# Patient Record
Sex: FEMALE | Race: WHITE | Employment: OTHER | URBAN - METROPOLITAN AREA
[De-identification: names, ages, dates, MRNs, and addresses within clinical notes are randomized per-mention and may not be internally consistent; named-entity substitution may affect disease eponyms.]

---

## 2017-12-09 ENCOUNTER — HOSPITAL ENCOUNTER (OUTPATIENT)
Dept: ULTRASOUND IMAGING | Age: 77
Discharge: HOME | End: 2017-12-09
Attending: INTERNAL MEDICINE | Admitting: INTERNAL MEDICINE

## 2017-12-09 DIAGNOSIS — N17.9 ACUTE RENAL FAILURE, UNSPECIFIED ACUTE RENAL FAILURE TYPE (HCC): ICD-10-CM

## 2017-12-09 DIAGNOSIS — N17.9 ACUTE KIDNEY FAILURE (HCC): ICD-10-CM

## 2018-02-05 PROBLEM — E66.9 OBESITY: Status: ACTIVE | Noted: 2018-02-05

## 2018-02-05 PROBLEM — R09.02 HYPOXIA: Status: ACTIVE | Noted: 2018-02-05

## 2018-02-05 PROBLEM — J18.0 BRONCHOPNEUMONIA: Status: ACTIVE | Noted: 2018-02-05

## 2018-02-05 PROBLEM — N18.9 CKD (CHRONIC KIDNEY DISEASE): Status: ACTIVE | Noted: 2018-02-05

## 2020-01-01 ENCOUNTER — HOSPITAL ENCOUNTER (INPATIENT)
Age: 80
LOS: 10 days | DRG: 329 | End: 2021-01-06
Attending: EMERGENCY MEDICINE | Admitting: INTERNAL MEDICINE
Payer: MEDICARE

## 2020-01-01 ENCOUNTER — ANESTHESIA EVENT (OUTPATIENT)
Dept: OPERATING ROOM | Age: 80
DRG: 329 | End: 2020-01-01
Payer: MEDICARE

## 2020-01-01 ENCOUNTER — ANESTHESIA (OUTPATIENT)
Dept: OPERATING ROOM | Age: 80
DRG: 329 | End: 2020-01-01
Payer: MEDICARE

## 2020-01-01 ENCOUNTER — APPOINTMENT (OUTPATIENT)
Dept: CT IMAGING | Age: 80
DRG: 329 | End: 2020-01-01
Payer: MEDICARE

## 2020-01-01 ENCOUNTER — APPOINTMENT (OUTPATIENT)
Dept: GENERAL RADIOLOGY | Age: 80
DRG: 329 | End: 2020-01-01
Payer: MEDICARE

## 2020-01-01 VITALS
RESPIRATION RATE: 1 BRPM | OXYGEN SATURATION: 99 % | SYSTOLIC BLOOD PRESSURE: 184 MMHG | DIASTOLIC BLOOD PRESSURE: 87 MMHG

## 2020-01-01 DIAGNOSIS — E87.1 HYPONATREMIA: ICD-10-CM

## 2020-01-01 DIAGNOSIS — N17.9 AKI (ACUTE KIDNEY INJURY) (HCC): ICD-10-CM

## 2020-01-01 DIAGNOSIS — R10.30 ABDOMINAL PAIN, LOWER: ICD-10-CM

## 2020-01-01 DIAGNOSIS — R11.2 NON-INTRACTABLE VOMITING WITH NAUSEA, UNSPECIFIED VOMITING TYPE: ICD-10-CM

## 2020-01-01 DIAGNOSIS — K56.609 SMALL BOWEL OBSTRUCTION (HCC): Primary | ICD-10-CM

## 2020-01-01 DIAGNOSIS — E87.6 HYPOKALEMIA: ICD-10-CM

## 2020-01-01 DIAGNOSIS — K56.601 COMPLETE INTESTINAL OBSTRUCTION, UNSPECIFIED CAUSE (HCC): ICD-10-CM

## 2020-01-01 DIAGNOSIS — R93.5 ABNORMAL CT OF THE ABDOMEN: ICD-10-CM

## 2020-01-01 LAB
A/G RATIO: 1.2 (ref 1.1–2.2)
ALBUMIN SERPL-MCNC: 3.6 G/DL (ref 3.4–5)
ALP BLD-CCNC: 102 U/L (ref 40–129)
ALT SERPL-CCNC: 17 U/L (ref 10–40)
ANION GAP SERPL CALCULATED.3IONS-SCNC: 11 MMOL/L (ref 3–16)
ANION GAP SERPL CALCULATED.3IONS-SCNC: 12 MMOL/L (ref 3–16)
ANION GAP SERPL CALCULATED.3IONS-SCNC: 14 MMOL/L (ref 3–16)
ANION GAP SERPL CALCULATED.3IONS-SCNC: 15 MMOL/L (ref 3–16)
ANION GAP SERPL CALCULATED.3IONS-SCNC: 17 MMOL/L (ref 3–16)
AST SERPL-CCNC: 35 U/L (ref 15–37)
ATYPICAL LYMPHOCYTE RELATIVE PERCENT: 2 % (ref 0–6)
BANDED NEUTROPHILS RELATIVE PERCENT: 2 % (ref 0–7)
BASOPHILS ABSOLUTE: 0 K/UL (ref 0–0.2)
BASOPHILS ABSOLUTE: 0.1 K/UL (ref 0–0.2)
BASOPHILS RELATIVE PERCENT: 0 %
BASOPHILS RELATIVE PERCENT: 0.2 %
BASOPHILS RELATIVE PERCENT: 0.2 %
BASOPHILS RELATIVE PERCENT: 0.3 %
BASOPHILS RELATIVE PERCENT: 0.3 %
BILIRUB SERPL-MCNC: 0.4 MG/DL (ref 0–1)
BILIRUBIN URINE: NEGATIVE
BLOOD, URINE: NEGATIVE
BUN BLDV-MCNC: 34 MG/DL (ref 7–20)
BUN BLDV-MCNC: 38 MG/DL (ref 7–20)
BUN BLDV-MCNC: 49 MG/DL (ref 7–20)
BUN BLDV-MCNC: 83 MG/DL (ref 7–20)
BUN BLDV-MCNC: 96 MG/DL (ref 7–20)
CALCIUM SERPL-MCNC: 8.2 MG/DL (ref 8.3–10.6)
CALCIUM SERPL-MCNC: 8.3 MG/DL (ref 8.3–10.6)
CALCIUM SERPL-MCNC: 9 MG/DL (ref 8.3–10.6)
CALCIUM SERPL-MCNC: 9.1 MG/DL (ref 8.3–10.6)
CALCIUM SERPL-MCNC: 9.2 MG/DL (ref 8.3–10.6)
CHLORIDE BLD-SCNC: 100 MMOL/L (ref 99–110)
CHLORIDE BLD-SCNC: 103 MMOL/L (ref 99–110)
CHLORIDE BLD-SCNC: 89 MMOL/L (ref 99–110)
CHLORIDE BLD-SCNC: 96 MMOL/L (ref 99–110)
CHLORIDE BLD-SCNC: 98 MMOL/L (ref 99–110)
CLARITY: ABNORMAL
CO2: 21 MMOL/L (ref 21–32)
CO2: 23 MMOL/L (ref 21–32)
CO2: 24 MMOL/L (ref 21–32)
COLOR: YELLOW
CREAT SERPL-MCNC: 1.2 MG/DL (ref 0.6–1.2)
CREAT SERPL-MCNC: 1.4 MG/DL (ref 0.6–1.2)
CREAT SERPL-MCNC: 1.6 MG/DL (ref 0.6–1.2)
CREAT SERPL-MCNC: 2.1 MG/DL (ref 0.6–1.2)
CREAT SERPL-MCNC: 2.6 MG/DL (ref 0.6–1.2)
EOSINOPHILS ABSOLUTE: 0 K/UL (ref 0–0.6)
EOSINOPHILS ABSOLUTE: 0 K/UL (ref 0–0.6)
EOSINOPHILS ABSOLUTE: 0.1 K/UL (ref 0–0.6)
EOSINOPHILS ABSOLUTE: 0.1 K/UL (ref 0–0.6)
EOSINOPHILS ABSOLUTE: 0.2 K/UL (ref 0–0.6)
EOSINOPHILS RELATIVE PERCENT: 0.1 %
EOSINOPHILS RELATIVE PERCENT: 0.1 %
EOSINOPHILS RELATIVE PERCENT: 0.3 %
EOSINOPHILS RELATIVE PERCENT: 0.7 %
EOSINOPHILS RELATIVE PERCENT: 2 %
GFR AFRICAN AMERICAN: 22
GFR AFRICAN AMERICAN: 28
GFR AFRICAN AMERICAN: 38
GFR AFRICAN AMERICAN: 44
GFR AFRICAN AMERICAN: 53
GFR NON-AFRICAN AMERICAN: 18
GFR NON-AFRICAN AMERICAN: 23
GFR NON-AFRICAN AMERICAN: 31
GFR NON-AFRICAN AMERICAN: 36
GFR NON-AFRICAN AMERICAN: 43
GLOBULIN: 3 G/DL
GLUCOSE BLD-MCNC: 100 MG/DL (ref 70–99)
GLUCOSE BLD-MCNC: 104 MG/DL (ref 70–99)
GLUCOSE BLD-MCNC: 113 MG/DL (ref 70–99)
GLUCOSE BLD-MCNC: 115 MG/DL (ref 70–99)
GLUCOSE BLD-MCNC: 150 MG/DL (ref 70–99)
GLUCOSE URINE: NEGATIVE MG/DL
HCT VFR BLD CALC: 32.2 % (ref 36–48)
HCT VFR BLD CALC: 33.9 % (ref 36–48)
HCT VFR BLD CALC: 36.6 % (ref 36–48)
HCT VFR BLD CALC: 36.7 % (ref 36–48)
HCT VFR BLD CALC: 37.5 % (ref 36–48)
HEMOGLOBIN: 10.4 G/DL (ref 12–16)
HEMOGLOBIN: 10.9 G/DL (ref 12–16)
HEMOGLOBIN: 11.9 G/DL (ref 12–16)
HEMOGLOBIN: 12.1 G/DL (ref 12–16)
HEMOGLOBIN: 12.4 G/DL (ref 12–16)
KETONES, URINE: NEGATIVE MG/DL
LEUKOCYTE ESTERASE, URINE: NEGATIVE
LIPASE: 108 U/L (ref 13–60)
LYMPHOCYTES ABSOLUTE: 1.4 K/UL (ref 1–5.1)
LYMPHOCYTES ABSOLUTE: 1.5 K/UL (ref 1–5.1)
LYMPHOCYTES ABSOLUTE: 1.6 K/UL (ref 1–5.1)
LYMPHOCYTES ABSOLUTE: 1.6 K/UL (ref 1–5.1)
LYMPHOCYTES ABSOLUTE: 2.1 K/UL (ref 1–5.1)
LYMPHOCYTES RELATIVE PERCENT: 12 %
LYMPHOCYTES RELATIVE PERCENT: 12.1 %
LYMPHOCYTES RELATIVE PERCENT: 16.5 %
LYMPHOCYTES RELATIVE PERCENT: 25.6 %
LYMPHOCYTES RELATIVE PERCENT: 9.8 %
MAGNESIUM: 1.6 MG/DL (ref 1.8–2.4)
MAGNESIUM: 1.6 MG/DL (ref 1.8–2.4)
MAGNESIUM: 1.8 MG/DL (ref 1.8–2.4)
MCH RBC QN AUTO: 27 PG (ref 26–34)
MCH RBC QN AUTO: 27 PG (ref 26–34)
MCH RBC QN AUTO: 27.1 PG (ref 26–34)
MCH RBC QN AUTO: 27.8 PG (ref 26–34)
MCH RBC QN AUTO: 28 PG (ref 26–34)
MCHC RBC AUTO-ENTMCNC: 31.6 G/DL (ref 31–36)
MCHC RBC AUTO-ENTMCNC: 32.2 G/DL (ref 31–36)
MCHC RBC AUTO-ENTMCNC: 32.4 G/DL (ref 31–36)
MCHC RBC AUTO-ENTMCNC: 33.1 G/DL (ref 31–36)
MCHC RBC AUTO-ENTMCNC: 33.7 G/DL (ref 31–36)
MCV RBC AUTO: 82.4 FL (ref 80–100)
MCV RBC AUTO: 83.8 FL (ref 80–100)
MCV RBC AUTO: 83.9 FL (ref 80–100)
MCV RBC AUTO: 84.7 FL (ref 80–100)
MCV RBC AUTO: 85.3 FL (ref 80–100)
MICROSCOPIC EXAMINATION: ABNORMAL
MONOCYTES ABSOLUTE: 0.8 K/UL (ref 0–1.3)
MONOCYTES ABSOLUTE: 0.9 K/UL (ref 0–1.3)
MONOCYTES ABSOLUTE: 1 K/UL (ref 0–1.3)
MONOCYTES ABSOLUTE: 1 K/UL (ref 0–1.3)
MONOCYTES ABSOLUTE: 1.2 K/UL (ref 0–1.3)
MONOCYTES RELATIVE PERCENT: 10.1 %
MONOCYTES RELATIVE PERCENT: 10.2 %
MONOCYTES RELATIVE PERCENT: 11.9 %
MONOCYTES RELATIVE PERCENT: 6.4 %
MONOCYTES RELATIVE PERCENT: 7 %
NEUTROPHILS ABSOLUTE: 13.3 K/UL (ref 1.7–7.7)
NEUTROPHILS ABSOLUTE: 5.1 K/UL (ref 1.7–7.7)
NEUTROPHILS ABSOLUTE: 6.4 K/UL (ref 1.7–7.7)
NEUTROPHILS ABSOLUTE: 8.9 K/UL (ref 1.7–7.7)
NEUTROPHILS ABSOLUTE: 9.4 K/UL (ref 1.7–7.7)
NEUTROPHILS RELATIVE PERCENT: 61.5 %
NEUTROPHILS RELATIVE PERCENT: 73 %
NEUTROPHILS RELATIVE PERCENT: 75 %
NEUTROPHILS RELATIVE PERCENT: 77.5 %
NEUTROPHILS RELATIVE PERCENT: 83.2 %
NITRITE, URINE: NEGATIVE
PDW BLD-RTO: 14.2 % (ref 12.4–15.4)
PDW BLD-RTO: 14.4 % (ref 12.4–15.4)
PDW BLD-RTO: 14.5 % (ref 12.4–15.4)
PDW BLD-RTO: 14.6 % (ref 12.4–15.4)
PDW BLD-RTO: 14.7 % (ref 12.4–15.4)
PH UA: 5.5 (ref 5–8)
PLATELET # BLD: 248 K/UL (ref 135–450)
PLATELET # BLD: 259 K/UL (ref 135–450)
PLATELET # BLD: 283 K/UL (ref 135–450)
PLATELET # BLD: 295 K/UL (ref 135–450)
PLATELET # BLD: 308 K/UL (ref 135–450)
PMV BLD AUTO: 7.5 FL (ref 5–10.5)
PMV BLD AUTO: 7.7 FL (ref 5–10.5)
PMV BLD AUTO: 7.9 FL (ref 5–10.5)
PMV BLD AUTO: 8 FL (ref 5–10.5)
PMV BLD AUTO: 8.1 FL (ref 5–10.5)
POTASSIUM REFLEX MAGNESIUM: 2.8 MMOL/L (ref 3.5–5.1)
POTASSIUM REFLEX MAGNESIUM: 3.2 MMOL/L (ref 3.5–5.1)
POTASSIUM REFLEX MAGNESIUM: 3.5 MMOL/L (ref 3.5–5.1)
POTASSIUM REFLEX MAGNESIUM: 3.8 MMOL/L (ref 3.5–5.1)
POTASSIUM SERPL-SCNC: 3.3 MMOL/L (ref 3.5–5.1)
PROTEIN UA: NEGATIVE MG/DL
RBC # BLD: 3.84 M/UL (ref 4–5.2)
RBC # BLD: 4.05 M/UL (ref 4–5.2)
RBC # BLD: 4.32 M/UL (ref 4–5.2)
RBC # BLD: 4.4 M/UL (ref 4–5.2)
RBC # BLD: 4.45 M/UL (ref 4–5.2)
SARS-COV-2, NAAT: NOT DETECTED
SODIUM BLD-SCNC: 128 MMOL/L (ref 136–145)
SODIUM BLD-SCNC: 131 MMOL/L (ref 136–145)
SODIUM BLD-SCNC: 135 MMOL/L (ref 136–145)
SODIUM BLD-SCNC: 138 MMOL/L (ref 136–145)
SODIUM BLD-SCNC: 139 MMOL/L (ref 136–145)
SPECIFIC GRAVITY UA: 1.02 (ref 1–1.03)
TOTAL PROTEIN: 6.6 G/DL (ref 6.4–8.2)
URINE REFLEX TO CULTURE: ABNORMAL
URINE TYPE: ABNORMAL
UROBILINOGEN, URINE: 0.2 E.U./DL
WBC # BLD: 11.6 K/UL (ref 4–11)
WBC # BLD: 12.2 K/UL (ref 4–11)
WBC # BLD: 16 K/UL (ref 4–11)
WBC # BLD: 8.2 K/UL (ref 4–11)
WBC # BLD: 8.8 K/UL (ref 4–11)

## 2020-01-01 PROCEDURE — 2580000003 HC RX 258: Performed by: SURGERY

## 2020-01-01 PROCEDURE — 36415 COLL VENOUS BLD VENIPUNCTURE: CPT

## 2020-01-01 PROCEDURE — 97535 SELF CARE MNGMENT TRAINING: CPT

## 2020-01-01 PROCEDURE — 2580000003 HC RX 258: Performed by: INTERNAL MEDICINE

## 2020-01-01 PROCEDURE — 81003 URINALYSIS AUTO W/O SCOPE: CPT

## 2020-01-01 PROCEDURE — 6360000002 HC RX W HCPCS: Performed by: INTERNAL MEDICINE

## 2020-01-01 PROCEDURE — APPSS45 APP SPLIT SHARED TIME 31-45 MINUTES: Performed by: CLINICAL NURSE SPECIALIST

## 2020-01-01 PROCEDURE — 2500000003 HC RX 250 WO HCPCS: Performed by: SURGERY

## 2020-01-01 PROCEDURE — 85025 COMPLETE CBC W/AUTO DIFF WBC: CPT

## 2020-01-01 PROCEDURE — 6360000002 HC RX W HCPCS: Performed by: SURGERY

## 2020-01-01 PROCEDURE — 6360000002 HC RX W HCPCS: Performed by: NURSE PRACTITIONER

## 2020-01-01 PROCEDURE — 1200000000 HC SEMI PRIVATE

## 2020-01-01 PROCEDURE — 99024 POSTOP FOLLOW-UP VISIT: CPT | Performed by: SURGERY

## 2020-01-01 PROCEDURE — 2500000003 HC RX 250 WO HCPCS: Performed by: NURSE ANESTHETIST, CERTIFIED REGISTERED

## 2020-01-01 PROCEDURE — 0DBG0ZZ EXCISION OF LEFT LARGE INTESTINE, OPEN APPROACH: ICD-10-PCS | Performed by: SURGERY

## 2020-01-01 PROCEDURE — 6370000000 HC RX 637 (ALT 250 FOR IP): Performed by: NURSE PRACTITIONER

## 2020-01-01 PROCEDURE — 96375 TX/PRO/DX INJ NEW DRUG ADDON: CPT

## 2020-01-01 PROCEDURE — 74176 CT ABD & PELVIS W/O CONTRAST: CPT

## 2020-01-01 PROCEDURE — U0002 COVID-19 LAB TEST NON-CDC: HCPCS

## 2020-01-01 PROCEDURE — 83735 ASSAY OF MAGNESIUM: CPT

## 2020-01-01 PROCEDURE — 2700000000 HC OXYGEN THERAPY PER DAY

## 2020-01-01 PROCEDURE — 3600000004 HC SURGERY LEVEL 4 BASE: Performed by: SURGERY

## 2020-01-01 PROCEDURE — 6360000002 HC RX W HCPCS: Performed by: NURSE ANESTHETIST, CERTIFIED REGISTERED

## 2020-01-01 PROCEDURE — 80048 BASIC METABOLIC PNL TOTAL CA: CPT

## 2020-01-01 PROCEDURE — 96365 THER/PROPH/DIAG IV INF INIT: CPT

## 2020-01-01 PROCEDURE — 97161 PT EVAL LOW COMPLEX 20 MIN: CPT

## 2020-01-01 PROCEDURE — 6360000002 HC RX W HCPCS: Performed by: CLINICAL NURSE SPECIALIST

## 2020-01-01 PROCEDURE — 80053 COMPREHEN METABOLIC PANEL: CPT

## 2020-01-01 PROCEDURE — 3700000000 HC ANESTHESIA ATTENDED CARE: Performed by: SURGERY

## 2020-01-01 PROCEDURE — 97110 THERAPEUTIC EXERCISES: CPT

## 2020-01-01 PROCEDURE — 2580000003 HC RX 258: Performed by: NURSE PRACTITIONER

## 2020-01-01 PROCEDURE — 2720000010 HC SURG SUPPLY STERILE: Performed by: SURGERY

## 2020-01-01 PROCEDURE — 44140 PARTIAL REMOVAL OF COLON: CPT | Performed by: SURGERY

## 2020-01-01 PROCEDURE — 44139 MOBILIZATION OF COLON: CPT | Performed by: SURGERY

## 2020-01-01 PROCEDURE — 74022 RADEX COMPL AQT ABD SERIES: CPT

## 2020-01-01 PROCEDURE — 6360000002 HC RX W HCPCS

## 2020-01-01 PROCEDURE — 83690 ASSAY OF LIPASE: CPT

## 2020-01-01 PROCEDURE — 3600000014 HC SURGERY LEVEL 4 ADDTL 15MIN: Performed by: SURGERY

## 2020-01-01 PROCEDURE — 97116 GAIT TRAINING THERAPY: CPT

## 2020-01-01 PROCEDURE — 97166 OT EVAL MOD COMPLEX 45 MIN: CPT

## 2020-01-01 PROCEDURE — 94761 N-INVAS EAR/PLS OXIMETRY MLT: CPT

## 2020-01-01 PROCEDURE — 6370000000 HC RX 637 (ALT 250 FOR IP): Performed by: SURGERY

## 2020-01-01 PROCEDURE — 7100000001 HC PACU RECOVERY - ADDTL 15 MIN: Performed by: SURGERY

## 2020-01-01 PROCEDURE — 88307 TISSUE EXAM BY PATHOLOGIST: CPT

## 2020-01-01 PROCEDURE — 2709999900 HC NON-CHARGEABLE SUPPLY: Performed by: SURGERY

## 2020-01-01 PROCEDURE — 7100000000 HC PACU RECOVERY - FIRST 15 MIN: Performed by: SURGERY

## 2020-01-01 PROCEDURE — 3700000001 HC ADD 15 MINUTES (ANESTHESIA): Performed by: SURGERY

## 2020-01-01 PROCEDURE — 6370000000 HC RX 637 (ALT 250 FOR IP): Performed by: INTERNAL MEDICINE

## 2020-01-01 PROCEDURE — 99285 EMERGENCY DEPT VISIT HI MDM: CPT

## 2020-01-01 PROCEDURE — 74018 RADEX ABDOMEN 1 VIEW: CPT

## 2020-01-01 PROCEDURE — 99222 1ST HOSP IP/OBS MODERATE 55: CPT | Performed by: SURGERY

## 2020-01-01 PROCEDURE — C2626 INFUSION PUMP, NON-PROG,TEMP: HCPCS | Performed by: SURGERY

## 2020-01-01 RX ORDER — POTASSIUM CHLORIDE 7.45 MG/ML
10 INJECTION INTRAVENOUS
Status: COMPLETED | OUTPATIENT
Start: 2020-01-01 | End: 2020-01-01

## 2020-01-01 RX ORDER — LABETALOL HYDROCHLORIDE 5 MG/ML
5 INJECTION, SOLUTION INTRAVENOUS EVERY 10 MIN PRN
Status: DISCONTINUED | OUTPATIENT
Start: 2020-01-01 | End: 2020-01-01 | Stop reason: HOSPADM

## 2020-01-01 RX ORDER — PROCHLORPERAZINE EDISYLATE 5 MG/ML
10 INJECTION INTRAMUSCULAR; INTRAVENOUS EVERY 6 HOURS PRN
Status: DISCONTINUED | OUTPATIENT
Start: 2020-01-01 | End: 2021-01-01

## 2020-01-01 RX ORDER — M-VIT,TX,IRON,MINS/CALC/FOLIC 27MG-0.4MG
1 TABLET ORAL DAILY
Status: CANCELLED | OUTPATIENT
Start: 2020-01-01

## 2020-01-01 RX ORDER — 0.9 % SODIUM CHLORIDE 0.9 %
1000 INTRAVENOUS SOLUTION INTRAVENOUS ONCE
Status: COMPLETED | OUTPATIENT
Start: 2020-01-01 | End: 2020-01-01

## 2020-01-01 RX ORDER — ACETAMINOPHEN 650 MG/1
650 SUPPOSITORY RECTAL EVERY 6 HOURS PRN
Status: DISCONTINUED | OUTPATIENT
Start: 2020-01-01 | End: 2021-01-01

## 2020-01-01 RX ORDER — ACETAMINOPHEN 325 MG/1
650 TABLET ORAL EVERY 6 HOURS PRN
Status: DISCONTINUED | OUTPATIENT
Start: 2020-01-01 | End: 2021-01-01

## 2020-01-01 RX ORDER — ONDANSETRON 2 MG/ML
4 INJECTION INTRAMUSCULAR; INTRAVENOUS ONCE
Status: COMPLETED | OUTPATIENT
Start: 2020-01-01 | End: 2020-01-01

## 2020-01-01 RX ORDER — CALCIUM CARBONATE/VITAMIN D3 600 MG-10
1 TABLET ORAL DAILY
Status: CANCELLED | OUTPATIENT
Start: 2020-01-01

## 2020-01-01 RX ORDER — HYDROMORPHONE HCL 110MG/55ML
0.25 PATIENT CONTROLLED ANALGESIA SYRINGE INTRAVENOUS
Status: DISCONTINUED | OUTPATIENT
Start: 2020-01-01 | End: 2021-01-01

## 2020-01-01 RX ORDER — PROPOFOL 10 MG/ML
INJECTION, EMULSION INTRAVENOUS PRN
Status: DISCONTINUED | OUTPATIENT
Start: 2020-01-01 | End: 2020-01-01 | Stop reason: SDUPTHER

## 2020-01-01 RX ORDER — MORPHINE SULFATE 2 MG/ML
1 INJECTION, SOLUTION INTRAMUSCULAR; INTRAVENOUS EVERY 5 MIN PRN
Status: DISCONTINUED | OUTPATIENT
Start: 2020-01-01 | End: 2020-01-01 | Stop reason: HOSPADM

## 2020-01-01 RX ORDER — CHLORAL HYDRATE 500 MG
3000 CAPSULE ORAL 3 TIMES DAILY
Status: CANCELLED | OUTPATIENT
Start: 2020-01-01

## 2020-01-01 RX ORDER — POTASSIUM CHLORIDE 7.45 MG/ML
10 INJECTION INTRAVENOUS ONCE
Status: COMPLETED | OUTPATIENT
Start: 2020-01-01 | End: 2020-01-01

## 2020-01-01 RX ORDER — MAGNESIUM HYDROXIDE 1200 MG/15ML
LIQUID ORAL CONTINUOUS PRN
Status: COMPLETED | OUTPATIENT
Start: 2020-01-01 | End: 2020-01-01

## 2020-01-01 RX ORDER — AMLODIPINE BESYLATE 5 MG/1
10 TABLET ORAL DAILY
Status: CANCELLED | OUTPATIENT
Start: 2020-01-01

## 2020-01-01 RX ORDER — METOPROLOL SUCCINATE 25 MG/1
25 TABLET, EXTENDED RELEASE ORAL DAILY
Status: DISCONTINUED | OUTPATIENT
Start: 2020-01-01 | End: 2021-01-01

## 2020-01-01 RX ORDER — OXYCODONE HYDROCHLORIDE AND ACETAMINOPHEN 5; 325 MG/1; MG/1
1 TABLET ORAL PRN
Status: DISCONTINUED | OUTPATIENT
Start: 2020-01-01 | End: 2020-01-01 | Stop reason: HOSPADM

## 2020-01-01 RX ORDER — SODIUM CHLORIDE 9 MG/ML
INJECTION, SOLUTION INTRAVENOUS CONTINUOUS
Status: DISCONTINUED | OUTPATIENT
Start: 2020-01-01 | End: 2020-01-01

## 2020-01-01 RX ORDER — LIDOCAINE HYDROCHLORIDE 20 MG/ML
INJECTION, SOLUTION INFILTRATION; PERINEURAL PRN
Status: DISCONTINUED | OUTPATIENT
Start: 2020-01-01 | End: 2020-01-01 | Stop reason: SDUPTHER

## 2020-01-01 RX ORDER — DIPHENHYDRAMINE HYDROCHLORIDE 50 MG/ML
12.5 INJECTION INTRAMUSCULAR; INTRAVENOUS
Status: DISCONTINUED | OUTPATIENT
Start: 2020-01-01 | End: 2020-01-01 | Stop reason: HOSPADM

## 2020-01-01 RX ORDER — MORPHINE SULFATE 2 MG/ML
2 INJECTION, SOLUTION INTRAMUSCULAR; INTRAVENOUS EVERY 5 MIN PRN
Status: DISCONTINUED | OUTPATIENT
Start: 2020-01-01 | End: 2020-01-01 | Stop reason: HOSPADM

## 2020-01-01 RX ORDER — ONDANSETRON 2 MG/ML
INJECTION INTRAMUSCULAR; INTRAVENOUS PRN
Status: DISCONTINUED | OUTPATIENT
Start: 2020-01-01 | End: 2020-01-01 | Stop reason: SDUPTHER

## 2020-01-01 RX ORDER — ATORVASTATIN CALCIUM 20 MG/1
20 TABLET, FILM COATED ORAL DAILY
COMMUNITY

## 2020-01-01 RX ORDER — HEPARIN SODIUM 5000 [USP'U]/ML
5000 INJECTION, SOLUTION INTRAVENOUS; SUBCUTANEOUS EVERY 8 HOURS SCHEDULED
Status: DISCONTINUED | OUTPATIENT
Start: 2020-01-01 | End: 2021-01-01

## 2020-01-01 RX ORDER — CHOLECALCIFEROL (VITAMIN D3) 125 MCG
1000 CAPSULE ORAL DAILY
Status: CANCELLED | OUTPATIENT
Start: 2020-01-01

## 2020-01-01 RX ORDER — SODIUM CHLORIDE 0.9 % (FLUSH) 0.9 %
10 SYRINGE (ML) INJECTION PRN
Status: DISCONTINUED | OUTPATIENT
Start: 2020-01-01 | End: 2021-01-01

## 2020-01-01 RX ORDER — HYDRALAZINE HYDROCHLORIDE 20 MG/ML
5 INJECTION INTRAMUSCULAR; INTRAVENOUS EVERY 10 MIN PRN
Status: DISCONTINUED | OUTPATIENT
Start: 2020-01-01 | End: 2020-01-01 | Stop reason: HOSPADM

## 2020-01-01 RX ORDER — POLYETHYLENE GLYCOL 3350 17 G/17G
17 POWDER, FOR SOLUTION ORAL DAILY PRN
Status: DISCONTINUED | OUTPATIENT
Start: 2020-01-01 | End: 2021-01-01

## 2020-01-01 RX ORDER — OMEPRAZOLE 20 MG/1
20 CAPSULE, DELAYED RELEASE ORAL DAILY
Status: CANCELLED | OUTPATIENT
Start: 2020-01-01

## 2020-01-01 RX ORDER — MAGNESIUM SULFATE IN WATER 40 MG/ML
2 INJECTION, SOLUTION INTRAVENOUS ONCE
Status: COMPLETED | OUTPATIENT
Start: 2020-01-01 | End: 2020-01-01

## 2020-01-01 RX ORDER — OXYCODONE HYDROCHLORIDE AND ACETAMINOPHEN 5; 325 MG/1; MG/1
2 TABLET ORAL PRN
Status: DISCONTINUED | OUTPATIENT
Start: 2020-01-01 | End: 2020-01-01 | Stop reason: HOSPADM

## 2020-01-01 RX ORDER — DEXAMETHASONE SODIUM PHOSPHATE 4 MG/ML
INJECTION, SOLUTION INTRA-ARTICULAR; INTRALESIONAL; INTRAMUSCULAR; INTRAVENOUS; SOFT TISSUE PRN
Status: DISCONTINUED | OUTPATIENT
Start: 2020-01-01 | End: 2020-01-01 | Stop reason: SDUPTHER

## 2020-01-01 RX ORDER — SODIUM CHLORIDE 9 MG/ML
INJECTION, SOLUTION INTRAVENOUS CONTINUOUS
Status: DISCONTINUED | OUTPATIENT
Start: 2020-01-01 | End: 2021-01-01

## 2020-01-01 RX ORDER — MEPERIDINE HYDROCHLORIDE 50 MG/ML
12.5 INJECTION INTRAMUSCULAR; INTRAVENOUS; SUBCUTANEOUS EVERY 5 MIN PRN
Status: DISCONTINUED | OUTPATIENT
Start: 2020-01-01 | End: 2020-01-01 | Stop reason: HOSPADM

## 2020-01-01 RX ORDER — SODIUM CHLORIDE 0.9 % (FLUSH) 0.9 %
10 SYRINGE (ML) INJECTION EVERY 12 HOURS SCHEDULED
Status: DISCONTINUED | OUTPATIENT
Start: 2020-01-01 | End: 2021-01-01

## 2020-01-01 RX ORDER — PROMETHAZINE HYDROCHLORIDE 25 MG/ML
6.25 INJECTION, SOLUTION INTRAMUSCULAR; INTRAVENOUS
Status: DISCONTINUED | OUTPATIENT
Start: 2020-01-01 | End: 2020-01-01 | Stop reason: HOSPADM

## 2020-01-01 RX ORDER — PRAVASTATIN SODIUM 80 MG/1
80 TABLET ORAL DAILY
Status: CANCELLED | OUTPATIENT
Start: 2020-01-01

## 2020-01-01 RX ORDER — SPIRONOLACTONE 25 MG/1
25 TABLET ORAL DAILY
COMMUNITY

## 2020-01-01 RX ORDER — ROCURONIUM BROMIDE 10 MG/ML
INJECTION, SOLUTION INTRAVENOUS PRN
Status: DISCONTINUED | OUTPATIENT
Start: 2020-01-01 | End: 2020-01-01 | Stop reason: SDUPTHER

## 2020-01-01 RX ORDER — ONDANSETRON 2 MG/ML
4 INJECTION INTRAMUSCULAR; INTRAVENOUS PRN
Status: DISCONTINUED | OUTPATIENT
Start: 2020-01-01 | End: 2020-01-01 | Stop reason: HOSPADM

## 2020-01-01 RX ADMIN — DEXAMETHASONE SODIUM PHOSPHATE 8 MG: 4 INJECTION, SOLUTION INTRAMUSCULAR; INTRAVENOUS at 13:04

## 2020-01-01 RX ADMIN — METRONIDAZOLE 500 MG: 500 INJECTION, SOLUTION INTRAVENOUS at 04:19

## 2020-01-01 RX ADMIN — SUGAMMADEX 200 MG: 100 INJECTION, SOLUTION INTRAVENOUS at 14:04

## 2020-01-01 RX ADMIN — MAGNESIUM SULFATE HEPTAHYDRATE 2 G: 40 INJECTION, SOLUTION INTRAVENOUS at 11:37

## 2020-01-01 RX ADMIN — HYDROMORPHONE HYDROCHLORIDE 0.5 MG: 1 INJECTION, SOLUTION INTRAMUSCULAR; INTRAVENOUS; SUBCUTANEOUS at 14:30

## 2020-01-01 RX ADMIN — CEFAZOLIN SODIUM 2 G: 10 INJECTION, POWDER, FOR SOLUTION INTRAVENOUS at 12:58

## 2020-01-01 RX ADMIN — SODIUM CHLORIDE: 9 INJECTION, SOLUTION INTRAVENOUS at 21:54

## 2020-01-01 RX ADMIN — SODIUM CHLORIDE 1000 ML: 9 INJECTION, SOLUTION INTRAVENOUS at 19:03

## 2020-01-01 RX ADMIN — CEFAZOLIN 1 G: 1 INJECTION, POWDER, FOR SOLUTION INTRAMUSCULAR; INTRAVENOUS at 00:01

## 2020-01-01 RX ADMIN — POTASSIUM CHLORIDE 10 MEQ: 7.46 INJECTION, SOLUTION INTRAVENOUS at 12:40

## 2020-01-01 RX ADMIN — SODIUM CHLORIDE 1000 ML: 9 INJECTION, SOLUTION INTRAVENOUS at 14:49

## 2020-01-01 RX ADMIN — PROCHLORPERAZINE EDISYLATE 10 MG: 5 INJECTION INTRAMUSCULAR; INTRAVENOUS at 04:28

## 2020-01-01 RX ADMIN — SODIUM CHLORIDE: 9 INJECTION, SOLUTION INTRAVENOUS at 15:52

## 2020-01-01 RX ADMIN — LIDOCAINE HYDROCHLORIDE 100 MG: 20 INJECTION, SOLUTION INFILTRATION; PERINEURAL at 12:46

## 2020-01-01 RX ADMIN — PROPOFOL 150 MG: 10 INJECTION, EMULSION INTRAVENOUS at 12:46

## 2020-01-01 RX ADMIN — HEPARIN SODIUM 5000 UNITS: 5000 INJECTION INTRAVENOUS; SUBCUTANEOUS at 14:53

## 2020-01-01 RX ADMIN — HYDROMORPHONE HYDROCHLORIDE 0.25 MG: 2 INJECTION INTRAMUSCULAR; INTRAVENOUS; SUBCUTANEOUS at 12:17

## 2020-01-01 RX ADMIN — POTASSIUM CHLORIDE 10 MEQ: 7.46 INJECTION, SOLUTION INTRAVENOUS at 14:41

## 2020-01-01 RX ADMIN — SODIUM CHLORIDE, PRESERVATIVE FREE 10 ML: 5 INJECTION INTRAVENOUS at 21:30

## 2020-01-01 RX ADMIN — PROCHLORPERAZINE EDISYLATE 10 MG: 5 INJECTION INTRAMUSCULAR; INTRAVENOUS at 21:47

## 2020-01-01 RX ADMIN — HEPARIN SODIUM 5000 UNITS: 5000 INJECTION INTRAVENOUS; SUBCUTANEOUS at 04:28

## 2020-01-01 RX ADMIN — HYDROMORPHONE HYDROCHLORIDE 0.5 MG: 1 INJECTION, SOLUTION INTRAMUSCULAR; INTRAVENOUS; SUBCUTANEOUS at 14:47

## 2020-01-01 RX ADMIN — HEPARIN SODIUM 5000 UNITS: 5000 INJECTION INTRAVENOUS; SUBCUTANEOUS at 15:52

## 2020-01-01 RX ADMIN — POTASSIUM CHLORIDE 10 MEQ: 7.46 INJECTION, SOLUTION INTRAVENOUS at 11:37

## 2020-01-01 RX ADMIN — SODIUM CHLORIDE, PRESERVATIVE FREE 10 ML: 5 INJECTION INTRAVENOUS at 21:47

## 2020-01-01 RX ADMIN — SODIUM CHLORIDE, PRESERVATIVE FREE 10 ML: 5 INJECTION INTRAVENOUS at 09:08

## 2020-01-01 RX ADMIN — ONDANSETRON 4 MG: 2 INJECTION INTRAMUSCULAR; INTRAVENOUS at 13:04

## 2020-01-01 RX ADMIN — PROCHLORPERAZINE EDISYLATE 10 MG: 5 INJECTION INTRAMUSCULAR; INTRAVENOUS at 13:12

## 2020-01-01 RX ADMIN — SODIUM CHLORIDE: 9 INJECTION, SOLUTION INTRAVENOUS at 22:49

## 2020-01-01 RX ADMIN — MAGNESIUM SULFATE HEPTAHYDRATE 2 G: 40 INJECTION, SOLUTION INTRAVENOUS at 09:33

## 2020-01-01 RX ADMIN — SODIUM CHLORIDE: 9 INJECTION, SOLUTION INTRAVENOUS at 10:10

## 2020-01-01 RX ADMIN — HEPARIN SODIUM 5000 UNITS: 5000 INJECTION INTRAVENOUS; SUBCUTANEOUS at 04:34

## 2020-01-01 RX ADMIN — POTASSIUM BICARBONATE 40 MEQ: 782 TABLET, EFFERVESCENT ORAL at 09:24

## 2020-01-01 RX ADMIN — SODIUM CHLORIDE: 9 INJECTION, SOLUTION INTRAVENOUS at 16:24

## 2020-01-01 RX ADMIN — ROCURONIUM BROMIDE 50 MG: 10 SOLUTION INTRAVENOUS at 12:46

## 2020-01-01 RX ADMIN — HEPARIN SODIUM 5000 UNITS: 5000 INJECTION INTRAVENOUS; SUBCUTANEOUS at 21:51

## 2020-01-01 RX ADMIN — HYDROMORPHONE HYDROCHLORIDE 0.25 MG: 2 INJECTION INTRAMUSCULAR; INTRAVENOUS; SUBCUTANEOUS at 21:50

## 2020-01-01 RX ADMIN — HEPARIN SODIUM 5000 UNITS: 5000 INJECTION INTRAVENOUS; SUBCUTANEOUS at 16:24

## 2020-01-01 RX ADMIN — SODIUM CHLORIDE, PRESERVATIVE FREE 10 ML: 5 INJECTION INTRAVENOUS at 21:27

## 2020-01-01 RX ADMIN — POTASSIUM CHLORIDE 10 MEQ: 7.46 INJECTION, SOLUTION INTRAVENOUS at 19:03

## 2020-01-01 RX ADMIN — SODIUM CHLORIDE: 9 INJECTION, SOLUTION INTRAVENOUS at 16:50

## 2020-01-01 RX ADMIN — POTASSIUM CHLORIDE 10 MEQ: 7.46 INJECTION, SOLUTION INTRAVENOUS at 13:40

## 2020-01-01 RX ADMIN — BENZOCAINE: 200 SPRAY DENTAL; ORAL; PERIODONTAL at 19:03

## 2020-01-01 RX ADMIN — SODIUM CHLORIDE, PRESERVATIVE FREE 10 ML: 5 INJECTION INTRAVENOUS at 09:31

## 2020-01-01 RX ADMIN — METOPROLOL SUCCINATE 25 MG: 25 TABLET, EXTENDED RELEASE ORAL at 09:09

## 2020-01-01 RX ADMIN — ONDANSETRON 4 MG: 2 INJECTION INTRAMUSCULAR; INTRAVENOUS at 14:49

## 2020-01-01 RX ADMIN — SODIUM CHLORIDE, PRESERVATIVE FREE 10 ML: 5 INJECTION INTRAVENOUS at 20:42

## 2020-01-01 RX ADMIN — METRONIDAZOLE 500 MG: 500 INJECTION, SOLUTION INTRAVENOUS at 21:27

## 2020-01-01 RX ADMIN — PROCHLORPERAZINE EDISYLATE 10 MG: 5 INJECTION INTRAMUSCULAR; INTRAVENOUS at 21:54

## 2020-01-01 RX ADMIN — HEPARIN SODIUM 5000 UNITS: 5000 INJECTION INTRAVENOUS; SUBCUTANEOUS at 05:13

## 2020-01-01 RX ADMIN — SODIUM CHLORIDE, PRESERVATIVE FREE 10 ML: 5 INJECTION INTRAVENOUS at 09:09

## 2020-01-01 RX ADMIN — HEPARIN SODIUM 5000 UNITS: 5000 INJECTION INTRAVENOUS; SUBCUTANEOUS at 21:28

## 2020-01-01 RX ADMIN — Medication 0.5 MG: at 14:47

## 2020-01-01 RX ADMIN — Medication 0.5 MG: at 14:30

## 2020-01-01 RX ADMIN — SODIUM CHLORIDE: 9 INJECTION, SOLUTION INTRAVENOUS at 01:10

## 2020-01-01 RX ADMIN — SODIUM CHLORIDE: 9 INJECTION, SOLUTION INTRAVENOUS at 12:18

## 2020-01-01 RX ADMIN — METRONIDAZOLE 500 MG: 500 INJECTION, SOLUTION INTRAVENOUS at 16:49

## 2020-01-01 RX ADMIN — SODIUM CHLORIDE: 9 INJECTION, SOLUTION INTRAVENOUS at 21:27

## 2020-01-01 RX ADMIN — HEPARIN SODIUM 5000 UNITS: 5000 INJECTION INTRAVENOUS; SUBCUTANEOUS at 20:42

## 2020-01-01 RX ADMIN — HEPARIN SODIUM 5000 UNITS: 5000 INJECTION INTRAVENOUS; SUBCUTANEOUS at 05:24

## 2020-01-01 ASSESSMENT — PULMONARY FUNCTION TESTS
PIF_VALUE: 20
PIF_VALUE: 16
PIF_VALUE: 15
PIF_VALUE: 16
PIF_VALUE: 17
PIF_VALUE: 16
PIF_VALUE: 1
PIF_VALUE: 16
PIF_VALUE: 1
PIF_VALUE: 1
PIF_VALUE: 16
PIF_VALUE: 17
PIF_VALUE: 18
PIF_VALUE: 1
PIF_VALUE: 16
PIF_VALUE: 15
PIF_VALUE: 16
PIF_VALUE: 1
PIF_VALUE: 1
PIF_VALUE: 17
PIF_VALUE: 16
PIF_VALUE: 17
PIF_VALUE: 3
PIF_VALUE: 16
PIF_VALUE: 1
PIF_VALUE: 1
PIF_VALUE: 16
PIF_VALUE: 18
PIF_VALUE: 16
PIF_VALUE: 17
PIF_VALUE: 16
PIF_VALUE: 2
PIF_VALUE: 16
PIF_VALUE: 16
PIF_VALUE: 17
PIF_VALUE: 1
PIF_VALUE: 16
PIF_VALUE: 16
PIF_VALUE: 2
PIF_VALUE: 17
PIF_VALUE: 3
PIF_VALUE: 1
PIF_VALUE: 17
PIF_VALUE: 18
PIF_VALUE: 1
PIF_VALUE: 17
PIF_VALUE: 16
PIF_VALUE: 1
PIF_VALUE: 16
PIF_VALUE: 1
PIF_VALUE: 1
PIF_VALUE: 16
PIF_VALUE: 17
PIF_VALUE: 17
PIF_VALUE: 16
PIF_VALUE: 17
PIF_VALUE: 15
PIF_VALUE: 1
PIF_VALUE: 16
PIF_VALUE: 17
PIF_VALUE: 16
PIF_VALUE: 16
PIF_VALUE: 17
PIF_VALUE: 1
PIF_VALUE: 16
PIF_VALUE: 17
PIF_VALUE: 27
PIF_VALUE: 17
PIF_VALUE: 17
PIF_VALUE: 1
PIF_VALUE: 17
PIF_VALUE: 16
PIF_VALUE: 20
PIF_VALUE: 17
PIF_VALUE: 17
PIF_VALUE: 16
PIF_VALUE: 1
PIF_VALUE: 20
PIF_VALUE: 17
PIF_VALUE: 16
PIF_VALUE: 17
PIF_VALUE: 18
PIF_VALUE: 1

## 2020-01-01 ASSESSMENT — PAIN DESCRIPTION - DESCRIPTORS
DESCRIPTORS: ACHING
DESCRIPTORS: ACHING

## 2020-01-01 ASSESSMENT — PAIN DESCRIPTION - ONSET
ONSET: ON-GOING
ONSET: ON-GOING

## 2020-01-01 ASSESSMENT — PAIN DESCRIPTION - PAIN TYPE: TYPE: SURGICAL PAIN;ACUTE PAIN

## 2020-01-01 ASSESSMENT — PAIN SCALES - GENERAL
PAINLEVEL_OUTOF10: 6
PAINLEVEL_OUTOF10: 0
PAINLEVEL_OUTOF10: 9
PAINLEVEL_OUTOF10: 3
PAINLEVEL_OUTOF10: 0
PAINLEVEL_OUTOF10: 6
PAINLEVEL_OUTOF10: 0
PAINLEVEL_OUTOF10: 2

## 2020-01-01 ASSESSMENT — PAIN DESCRIPTION - LOCATION
LOCATION: ABDOMEN

## 2020-01-01 ASSESSMENT — PAIN SCALES - WONG BAKER: WONGBAKER_NUMERICALRESPONSE: 2

## 2020-01-01 ASSESSMENT — PAIN DESCRIPTION - FREQUENCY: FREQUENCY: INTERMITTENT

## 2020-12-27 PROBLEM — K56.609 COLONIC OBSTRUCTION (HCC): Status: ACTIVE | Noted: 2020-01-01

## 2020-12-27 NOTE — ED NOTES
[facility] @ [facility]  RE: admission for SBO per Florinda Arango, HEATHER Chambers called back @ [address] [place] [address]  12/27/20 9934

## 2020-12-27 NOTE — ED NOTES
Patient's visitor (daughter) provided with Emergency Department Visitor Restrictions paper. Instructed to review and informed of the NO VISITOR policy at his time.        Riley Ayala RN  12/27/20 3944

## 2020-12-27 NOTE — ED PROVIDER NOTES
[address] [facility]  ED  EMERGENCY DEPARTMENT ENCOUNTER        Pt Name: Dillan Holt  MRN: [id number]  [place] 1940  Date of evaluation: 12/27/2020  Provider: ELAINE Ding CNP  PCP: Rosario Galeano MD     I have seen and evaluated this patient with my supervising physician Dr. Narciso Llamas   Patient presents with    Emesis     pt states she hasnt been feeling good since tuesday around 0630 with severe pain in the stomach and she can pee but hasnt had a bowel movement since Monday. Constipation is normal but goes about every 2 days or so    Abdominal Pain    Fatigue       HISTORY OF PRESENT ILLNESS   (Location, Timing/Onset, Context/Setting, Quality, Duration, Modifying Factors, Severity, Associated Signs and Symptoms)  Note limiting factors. Dillan Holt is a 68 y.o. female with medical history of JAHAIRA, vitamin D deficiency, morbid obesity, HLD, GERD, cholecystectomy, hysterectomy, CKD stage III, hypertension who presents the ED with complaints of lower abdominal pain with nausea and vomiting for the past 6 days. Patient does also feel constipated and said her last bowel movement was approximately 6 days ago. She does note that anytime she tries to eat or drink anything she had vomited back up. She had approximately 5 episodes of emesis yesterday, although has not had anything to vomit today as she did not try to eat or drink anything. She says she usually does have some episodes of constipation, however will take a stool softener and this is better. She does note some discomfort in her bladder whenever she urinates, although denies any hematuria or dysuria. She denies any smoking, alcohol use, or street drugs. She denies a recent EGD or colonoscopy. Nursing Notes were all reviewed and agreed with or any disagreements were addressed in the HPI.     REVIEW OF SYSTEMS BP Temp Temp src Pulse Resp SpO2 Height Weight   12/27/20 1452 -- -- 12/27/20 1400 12/27/20 1452 12/27/20 1400 12/27/20 1407 12/27/20 1407   126/78   105 18 90 % 5' 4\" (1.626 m) 210 lb (95.3 kg)       Physical Exam  Vitals signs and nursing note reviewed. Constitutional:       General: She is awake. Appearance: Normal appearance. She is well-developed. She is morbidly obese. HENT:      Head: Normocephalic and atraumatic. Nose: Nose normal.   Eyes:      General:         Right eye: No discharge. Left eye: No discharge. Neck:      Musculoskeletal: Normal range of motion. Cardiovascular:      Rate and Rhythm: Normal rate and regular rhythm. Heart sounds: Normal heart sounds. Pulmonary:      Effort: Pulmonary effort is normal. No respiratory distress. Breath sounds: Normal breath sounds. Abdominal:      General: Bowel sounds are normal.      Palpations: Abdomen is soft. Tenderness: There is abdominal tenderness in the right lower quadrant, suprapubic area and left lower quadrant. Musculoskeletal: Normal range of motion. Skin:     General: Skin is warm and dry. Coloration: Skin is not pale. Neurological:      Mental Status: She is alert and oriented to person, place, and time. Psychiatric:         Behavior: Behavior normal. Behavior is cooperative.          DIAGNOSTIC RESULTS   LABS:    Labs Reviewed   COMPREHENSIVE METABOLIC PANEL - Abnormal; Notable for the following components:       Result Value    Sodium 128 (*)     Potassium 3.3 (*)     Chloride 89 (*)     Glucose 115 (*)     BUN 96 (*)     CREATININE 2.6 (*)     GFR Non- 18 (*)     GFR  22 (*)     All other components within normal limits    Narrative:     Garrett Rollins tel. [phone],  Chemistry results called to and read back by Jose Rodriguez RN, 12/27/2020  16:46, by Zee Schultz  Performed at:  [address] [place] [address] [address] [facility] [address],  [place], [address]   Phone [phone]   LIPASE - Abnormal; Notable for the following components:    Lipase 108.0 (*)     All other components within normal limits    Narrative:     Everrett Part tel. [phone],  Chemistry results called to and read back by Danelle Sr RN, 12/27/2020  16:46, by Flynn Harman  Performed at:  [address],  [place], [address]   Phone [phone]   URINE RT REFLEX TO CULTURE - Abnormal; Notable for the following components:    Clarity, UA SL CLOUDY (*)     All other components within normal limits    Narrative:     Performed at:  [address],  [place], [address]   Phone [phone]   CBC WITH AUTO DIFFERENTIAL    Narrative:     Performed at:  [address],  [place], [address]   Phone [phone]       All other labs were within normal range or not returned as of this dictation. EKG: All EKG's are interpreted by the Emergency Department Physician in the absence of a cardiologist.  Please see their note for interpretation of EKG. RADIOLOGY:   Non-plain film images such as CT, Ultrasound and MRI are read by the radiologist. Plain radiographic images are visualized and preliminarily interpreted by the ED Provider with the below findings:        Interpretation per the Radiologist below, if available at the time of this note:    CT ABDOMEN PELVIS WO CONTRAST Additional Contrast? None   Preliminary Result   1. 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 There is at least partial bowel obstruction at the level of the lesion with   proximal colonic and small bowel distension. The lesion is best visualized   on series 601, image 38.   2. No other acute findings within the abdomen or pelvis. 3. Status post cholecystectomy and hysterectomy. -  Results discussed with patient. Labs show  CBC is unremarkable. CBC with sodium 128, potassium 3.3, chloride 89, glucose 115, BUN 96, creatinine 2.6. Lipase 108. Urine with cloudy clarity. CT abdomen pelvis without contrast shows findings consistent with a colon cancer in the mid transverse colon. There is at least partial bowel obstruction at the level of the lesion with proximal colonic and small bowel distention. The lesion is best visualized on series 601, image 38. No other acute findings within the abdomen or pelvis. Status post cholecystectomy and hysterectomy. Patient was informed on these results. She denies ever having a previous colonoscopy. The patient is agreeable with plan of care and disposition.  -  Disposition:   Admission    FINAL IMPRESSION      1. Small bowel obstruction ([address]    2. CHELSEA (acute kidney injury) ([address]    3. Abdominal pain, lower    4. Abnormal CT of the abdomen    5. Hypokalemia    6. Hyponatremia    7.  Non-intractable vomiting with nausea, unspecified vomiting type          DISPOSITION/PLAN   DISPOSITION    Admission         (Please note that portions of this note were completed with a voice recognition program.  Efforts were made to edit the dictations but occasionally words are mis-transcribed.)    ELAINE Vasquez CNP (electronically signed)            ELAINE Vasquez CNP  12/27/20 1959

## 2020-12-27 NOTE — ED NOTES
Fall risk screening completed. Fall risk bracelet applied to patient. Non-skid socks provided and placed on patient. The fall risk is indicated using  dome light . Based on score, a bed alarm was indicated and applied. The call light is within the patient's reach, and instructions for use were provided. The bed is in the lowest position with wheels locked. The patient has been advised to notify staff, using the call light, if there is a need to get up or use restroom. The patient verbalized understanding of safety precautions and how to contact staff for assistance.        Saida Berrios RN  12/27/20 5048

## 2020-12-28 NOTE — OP NOTE
[address] [address]                                OPERATIVE REPORT    PATIENT NAME: Cullen Aguilar                     :        1940  MED REC NO:   [id number]                          ROOM:       9488  ACCOUNT NO:   [de-identified]                           ADMIT DATE: 2020  PROVIDER:     Elgin Ross MD    DATE OF PROCEDURE:  2020    PREOPERATIVE DIAGNOSIS:  Obstructing transverse colon mass. POSTOPERATIVE DIAGNOSIS:  Obstructing transverse colon mass. PROCEDURE:  Left colectomy with anastomosis and mobilization of splenic  flexure. ANESTHESIA:  General.    SURGEON:  Elgin Ross MD    ESTIMATED BLOOD LOSS:  Less than 100 mL. INDICATIONS:  The patient is a 74-year-old woman who presented with  abdominal pain, nausea and vomiting, was found to have an obstructing  transverse colon mass in the distal transverse colon. She is brought  for resection. OPERATIVE SUMMARY:  After preoperative evaluation, the patient was  brought into the operating suite and placed in a comfortable supine  position on the operating room table. Monitoring equipment was  attached. General anesthesia was induced. Her abdomen was sterilely  prepped and draped and upper midline incision was made. The peritoneal  cavity was entered and the incision was extended. We identified the  transverse colon mass and _____ distal transverse colon. The descending  colon was mobilized from distal to proximal and then I mobilized the  splenic flexure in order to free this up for resection. I was able to  visualize the middle colic vessels and could see that the mass was  distal to these and this would be amenable to a left colectomy with a  proximal transverse to descending colon anastomosis. Therefore the colon was divided proximal to the mass with the CHARLENE  stapler.   _____ chosen in the descending colon and this was divided as well with a CHARLENE stapler. The LigaSure device was used to divide the  mesenteric vascular attachments, excising the left colon and splenic  flexure. This was passed off and the proximal transverse colon and  distal descending colon were placed adjacent to each other and secured  with silk sutures. Colotomies were made in a side-to-side, functional  end-to-end anastomosis was created with a firing of the stapler and  closed off with a firing of the stapler. The corners, apex, and staple  line intersections were all oversewn with silk sutures. The anastomosis  was palpated and felt to be widely patent. There was no sign of leak. This was dropped back into the peritoneal cavity. The abdomen was  irrigated and fluid was evacuated. A small skin nick was created  superior to the midline incision. We then changed her clothes. On-Q  catheters were tunneled laterally on either side. The fascia was closed  with a running suture of #1 Prolene. Subcutaneous tissues were  irrigated and the skin was closed with staples. Dry sterile dressings  were applied. All sponge, needle, and instrument counts were correct at  the end of case. The patient tolerated the procedure well and was taken  to recovery area in stable condition.         Lauren Whitt MD    D: 12/28/2020 14:31:10       T: 12/28/2020 14:35:47     [email or web address]  Job#: [id number]     Doc#: [id number]    CC:  Primary Care Physician

## 2020-12-28 NOTE — PLAN OF CARE
Patient currently off the floor in OR. Will reattempt later this evening/tomorrow. Continue rest of the current plan.   We will follow    Emmanuel Mauricio MD  Hospitalist Physician

## 2020-12-28 NOTE — FLOWSHEET NOTE
12/27/20 4001   Assessment   Charting Type Admission   Neurological   Neuro (WDL) WDL   Level of Consciousness Alert (0)   [place] Coma Scale   Eye Opening 4   Best Verbal Response 5   Best Motor Response 6   [place] Coma Scale Score 15   HEENT   HEENT (WDL) X   Right Eye Intact   Left Eye Intact   Nose Other (Comment); Intact  (NG tube to suction left nare )   Voice Normal   Teeth Missing teeth;Partial plate upper   Respiratory   Respiratory (WDL) X   Respiratory Pattern Regular; Tachypneic   Respiratory Depth Normal   Respiratory Quality/Effort Dyspnea with exertion   Chest Assessment Chest expansion symmetrical   L Breath Sounds Clear   R Breath Sounds Clear   Cardiac   Cardiac (WDL) WDL   Cardiac Regularity Regular   Cardiac Monitor   Telemetry Monitor On No   Telemetry Audible No   Telemetry Alarms Set No   Gastrointestinal   Abdominal (WDL) X  (NG to cont suction, colonic obstruction )   Abdomen Inspection Distended;Rounded   Tenderness Soft;Tenderness   Passing Flatus Y   RUQ Bowel Sounds Hypoactive   Last BM (including prior to admit) 12/27/20  (small one this evening, prior to that 6 days )   LUQ Bowel Sounds Hypoactive   RLQ Bowel Sounds Hypoactive   LLQ Bowel Sounds Hypoactive   GI Symptoms Constipation;Distention   Peripheral Vascular   Peripheral Vascular (WDL) WDL   Edema None   Skin Color/Condition   Skin Color/Condition (WDL) X   Skin Color Appropriate for ethnicity   Skin Condition/Temp Warm;Dry;Poor turgor;Flaky   Musculoskeletal   Musculoskeletal (WDL) X  (general weakness )   Genitourinary   Genitourinary (WDL) WDL   Flank Tenderness No   Suprapubic Tenderness No   Dysuria No   Anus/Rectum   Anus/Rectum (WDL) WDL   Psychosocial   Psychosocial (WDL) WDL

## 2020-12-28 NOTE — PROGRESS NOTES
Pt admitted to room  342 from PACU. Pt A/O x4. VSS. Midline incision C/D/I with slight bloody drainage. Pain ball intact. Pt reporting no pain currently. NG tube to CLWS with brown drainage. Jj catheter intact draining clear, yellow urine. Instructed pt we need her to sit on the side of the bed before going to bed tonight, pt verbalized understanding, wants to rest right now. Pt NPO, ice chips provided. Oriented pt to room and call light. Non skid socks on. Bed alarm active for safety. Bed locked and in lowest position. Call light and bedside table within reach. Will continue to monitor.

## 2020-12-28 NOTE — ANESTHESIA PRE PROCEDURE
Department of Anesthesiology  Preprocedure Note       Name:  Jenny Verdin   Age:  68 y.o.  :  1940                                          MRN:  [id number]         Date:  2020      Surgeon: Gabbie Moctezuma):  Iris Maciel MD    Procedure: Procedure(s):  BOWEL RESECTION EXTENDED RIGHT COLECTOMY    Medications prior to admission:   Prior to Admission medications    Medication Sig Start Date End Date Taking?  Authorizing Provider   potassium chloride (KLOR-CON M) 10 MEQ extended release tablet Take 10 mEq by mouth daily    Historical Provider, MD   pravastatin (PRAVACHOL) 80 MG tablet Take 80 mg by mouth daily    Historical Provider, MD   omeprazole (PRILOSEC) 20 MG delayed release capsule Take 20 mg by mouth daily    Historical Provider, MD   hydrochlorothiazide (HYDRODIURIL) 25 MG tablet Take 25 mg by mouth daily    Historical Provider, MD   bisoprolol (ZEBETA) 5 MG tablet Take 2.5 mg by mouth daily    Historical Provider, MD   amLODIPine (NORVASC) 10 MG tablet Take 10 mg by mouth daily    Historical Provider, MD   cyanocobalamin 1000 MCG tablet Take 1,000 mcg by mouth daily    Historical Provider, MD   Multiple Vitamins-Minerals (THERAPEUTIC MULTIVITAMIN-MINERALS) tablet Take 1 tablet by mouth daily    Historical Provider, MD   aspirin 81 MG EC tablet Take 81 mg by mouth daily    Historical Provider, MD   Omega-3 Fatty Acids (FISH OIL) 1000 MG CAPS Take 3,000 mg by mouth 3 times daily    Historical Provider, MD   Calcium Carb-Cholecalciferol (CALCIUM-VITAMIN D) 600-400 MG-UNIT TABS Take 1 tablet by mouth daily    Historical Provider, MD       Current medications:    Current Facility-Administered Medications   Medication Dose Route Frequency Provider Last Rate Last Admin    magnesium sulfate 2 g in 50 mL IVPB premix  2 g Intravenous Once Severa Filippo, MD        potassium chloride 10 mEq/100 mL IVPB (Peripheral Line)  10 mEq Intravenous Q1H Severa Filippo, MD  metoprolol succinate (TOPROL XL) extended release tablet 25 mg  25 mg Oral Daily Long Hearn MD        0.9 % sodium chloride infusion   Intravenous Continuous Long Hearn  mL/hr at 12/28/20 1010 New Bag at 12/28/20 1010    sodium chloride flush 0.9 % injection 10 mL  10 mL Intravenous 2 times per day Long Hearn MD        sodium chloride flush 0.9 % injection 10 mL  10 mL Intravenous PRN Long Hearn MD        polyethylene glycol (GLYCOLAX) packet 17 g  17 g Oral Daily PRN Long Hearn MD        acetaminophen (TYLENOL) tablet 650 mg  650 mg Oral Q6H PRN Long Hearn MD        Or    acetaminophen (TYLENOL) suppository 650 mg  650 mg Rectal Q6H PRN Long Hearn MD        prochlorperazine (COMPAZINE) injection 10 mg  10 mg Intravenous Q6H PRN Long Hearn MD        heparin (porcine) injection 5,000 Units  5,000 Units Subcutaneous 3 times per day Long Hearn MD   5,000 Units at 12/28/20 1291       Allergies:  No Known Allergies    Problem List:    Patient Active Problem List   Diagnosis Code    Bronchopneumonia J18.0    Hypoxia R09.02    CKD (chronic kidney disease) N18.9    Obesity E66.9    Colonic obstruction ([facility] [address] K56.609       Past Medical History:        Diagnosis Date    CKD (chronic kidney disease) stage 3, GFR 30-59 ml/min     Hypertension        Past Surgical History:  History reviewed. No pertinent surgical history. Social History:    Social History     Tobacco Use    Smoking status: Never Smoker    Smokeless tobacco: Never Used   Substance Use Topics    Alcohol use:  No                                Counseling given: Not Answered      Vital Signs (Current):   Vitals:    12/27/20 2209 12/27/20 2230 12/27/20 2249 12/28/20 0931   BP: 119/79 125/69  (!) 146/84   Pulse: 82 80  73   Resp: 18 18  18   Temp:  98.2 °F (36.8 °C)  98 °F (36.7 °C)   TempSrc:  Oral  Oral   SpO2: 94% 92%  93%   Weight:   210 lb 5.1 oz (95.4 kg)    Height: BP Readings from Last 3 Encounters:   12/28/20 (!) 146/84   02/08/18 (!) 148/84       NPO Status:                                                                                 BMI:   Wt Readings from Last 3 Encounters:   12/27/20 210 lb 5.1 oz (95.4 kg)   02/08/18 209 lb 6.4 oz (95 kg)     Body mass index is 36.1 kg/m². CBC:   Lab Results   Component Value Date    WBC 8.2 12/28/2020    RBC 4.32 12/28/2020    HGB 12.1 12/28/2020    HCT 36.6 12/28/2020    MCV 84.7 12/28/2020    RDW 14.7 12/28/2020     12/28/2020       CMP:   Lab Results   Component Value Date     12/28/2020    K 2.8 12/28/2020    CL 96 12/28/2020    CO2 24 12/28/2020    BUN 83 12/28/2020    CREATININE 2.1 12/28/2020    GFRAA 28 12/28/2020    AGRATIO 1.2 12/27/2020    LABGLOM 23 12/28/2020    GLUCOSE 100 12/28/2020    PROT 6.6 12/27/2020    CALCIUM 8.2 12/28/2020    BILITOT 0.4 12/27/2020    ALKPHOS 102 12/27/2020    AST 35 12/27/2020    ALT 17 12/27/2020       POC Tests: No results for input(s): POCGLU, POCNA, POCK, POCCL, POCBUN, POCHEMO, POCHCT in the last 72 hours.     Coags: No results found for: PROTIME, INR, APTT    HCG (If Applicable): No results found for: PREGTESTUR, PREGSERUM, HCG, HCGQUANT     ABGs: No results found for: PHART, PO2ART, GSC6YNC, UUN3EYZ, BEART, K8KFRMTF     Type & Screen (If Applicable):  No results found for: LABABO, LABRH    Drug/Infectious Status (If Applicable):  No results found for: HIV, HEPCAB    COVID-19 Screening (If Applicable): No results found for: COVID19      Anesthesia Evaluation  Patient summary reviewed and Nursing notes reviewed no history of anesthetic complications:   Airway: Mallampati: III     Neck ROM: full   Dental:    (+) edentulous      Pulmonary:Negative Pulmonary ROS and normal exam    (+) pneumonia:                             Cardiovascular:Negative CV ROS    (+) hypertension:,                   Neuro/Psych:   Negative Neuro/Psych ROS GI/Hepatic/Renal: Neg GI/Hepatic/Renal ROS  (+) renal disease: CRI,      (-) hiatal hernia and GERD       Endo/Other: Negative Endo/Other ROS                    Abdominal:           Vascular:                                      Anesthesia Plan      general     ASA 3     (I discussed with the patient the risks and benefits of PIV, general anesthesia, IV Narcotics, PACU. All questions were answered the patient agrees with the plan and wishes to proceed.  )  Induction: intravenous. Pre-Operative Diagnosis: Colonic obstruction ([facility] [address] [K56.609]    68 y.o.   BMI:  Body mass index is 36.1 kg/m².      Vitals:    12/27/20 2230 12/27/20 2249 12/28/20 0931 12/28/20 1213   BP: 125/69  (!) 146/84 (!) 138/55   Pulse: 80  73 70   Resp: 18  18 16   Temp: 98.2 °F (36.8 °C)  98 °F (36.7 °C) 97.2 °F (36.2 °C)   TempSrc: Oral  Oral Oral   SpO2: 92%  93% 90%   Weight:  210 lb 5.1 oz (95.4 kg)     Height:           No Known Allergies    Social History     Tobacco Use    Smoking status: Never Smoker    Smokeless tobacco: Never Used   Substance Use Topics    Alcohol use: No       LABS:    CBC  Lab Results   Component Value Date/Time    WBC 8.2 12/28/2020 06:51 AM    HGB 12.1 12/28/2020 06:51 AM    HCT 36.6 12/28/2020 06:51 AM     12/28/2020 06:51 AM     RENAL  Lab Results   Component Value Date/Time     (L) 12/28/2020 06:51 AM    K 2.8 (LL) 12/28/2020 06:51 AM    CL 96 (L) 12/28/2020 06:51 AM    CO2 24 12/28/2020 06:51 AM    BUN 83 (HH) 12/28/2020 06:51 AM    CREATININE 2.1 (H) 12/28/2020 06:51 AM    GLUCOSE 100 (H) 12/28/2020 06:51 AM     COAGS  No results found for: PROTIME, INR, APTT    Wendy MD Kayla   12/28/2020

## 2020-12-28 NOTE — H&P
Hospital Medicine History & Physical      PCP: Hector Green MD    Date of Admission: 12/27/2020    Date of Service: Pt seen/examined on 12/27/20 and Admitted to Inpatient with expected LOS greater than two midnights due to medical therapy. Chief Complaint:  Constipation, abdominal pain, n/v    History Of Present Illness: The patient is a pleasant 68 Y F with a h/o HTN and HLD. She normally has a BM QOD, but lately hasn't had one for 6 days. She felt constipated. Then belly pain with n/v starting three days ago. Unable to take any PO for the last 24 hours, so she came to the ED. 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 The patient has never had a colonoscopy. Labs showed CHELSEA. Called daughter, Michelina Babinski, twice per patient request - went immediately to voicemail. Past Medical History:          Diagnosis Date    CKD (chronic kidney disease) stage 3, GFR 30-59 ml/min     Hypertension        Past Surgical History:      History reviewed. No pertinent surgical history. Medications Prior to Admission:      Prior to Admission medications    Medication Sig Start Date End Date Taking?  Authorizing Provider   potassium chloride (KLOR-CON M) 10 MEQ extended release tablet Take 10 mEq by mouth daily    Historical Provider, MD   pravastatin (PRAVACHOL) 80 MG tablet Take 80 mg by mouth daily    Historical Provider, MD   omeprazole (PRILOSEC) 20 MG delayed release capsule Take 20 mg by mouth daily    Historical Provider, MD   hydrochlorothiazide (HYDRODIURIL) 25 MG tablet Take 25 mg by mouth daily    Historical Provider, MD   bisoprolol (ZEBETA) 5 MG tablet Take 2.5 mg by mouth daily    Historical Provider, MD   amLODIPine (NORVASC) 10 MG tablet Take 10 mg by mouth daily    Historical Provider, MD   cyanocobalamin 1000 MCG tablet Take 1,000 mcg by mouth daily    Historical Provider, MD Multiple Vitamins-Minerals (THERAPEUTIC MULTIVITAMIN-MINERALS) tablet Take 1 tablet by mouth daily    Historical Provider, MD   aspirin 81 MG EC tablet Take 81 mg by mouth daily    Historical Provider, MD   Omega-3 Fatty Acids (FISH OIL) 1000 MG CAPS Take 3,000 mg by mouth 3 times daily    Historical Provider, MD   Calcium Carb-Cholecalciferol (CALCIUM-VITAMIN D) 600-400 MG-UNIT TABS Take 1 tablet by mouth daily    Historical Provider, MD       Allergies:  Patient has no known allergies. Social History:      The patient currently lives at home    TOBACCO:   reports that she has never smoked. She has never used smokeless tobacco.  ETOH:   reports no history of alcohol use. E-Cigarettes/Vaping Use     Questions Responses    E-Cigarette/Vaping Use     Start Date     Passive Exposure     Quit Date     Counseling Given     Comments             Family History:      Reviewed in detail and negative for ESRD. Positive as follows:    History reviewed. No pertinent family history. REVIEW OF SYSTEMS:   Pertinent positives as noted in the HPI. All other systems reviewed and negative. PHYSICAL EXAM PERFORMED:    BP (!) 153/79   Pulse 84   Resp 20   Ht 5' 4\" (1.626 m)   Wt 210 lb (95.3 kg)   SpO2 96%   BMI 36.05 kg/m²     General appearance:  No apparent distress, appears stated age and cooperative. HEENT:  Normal cephalic, atraumatic without obvious deformity. Pupils equal, round, and reactive to light. Extra ocular muscles intact. Conjunctivae/corneas clear. Neck: Supple, with full range of motion. No jugular venous distention. Trachea midline. Respiratory:  Normal respiratory effort. Clear to auscultation, bilaterally without Rales/Wheezes/Rhonchi. Cardiovascular:  Regular rate and rhythm with normal S1/S2 without murmurs, rubs or gallops. Abdomen: Soft, mild diffuse tenderness, mildly distended, with normal bowel sounds. Musculoskeletal:  No clubbing, cyanosis or edema bilaterally. Full range of motion without deformity. Skin: Skin color, texture, turgor normal.  No rashes or lesions. Neurologic:  Neurovascularly intact without any focal sensory/motor deficits. Cranial nerves: II-XII intact, grossly non-focal.  Psychiatric:  Alert and oriented, thought content appropriate, normal insight. Scared. Capillary Refill: Brisk,< 3 seconds   Peripheral Pulses: +2 palpable, equal bilaterally       Labs:     Recent Labs     12/27/20  1603   WBC 8.8   HGB 12.4   HCT 36.7        Recent Labs     12/27/20  1603   *   K 3.3*   CL 89*   CO2 24   BUN 96*   CREATININE 2.6*   CALCIUM 8.3     Recent Labs     12/27/20  1603   AST 35   ALT 17   BILITOT 0.4   ALKPHOS 102     No results for input(s): INR in the last 72 hours. No results for input(s): [place] in the last 72 hours. Urinalysis:      Lab Results   Component Value Date    NITRU Negative 12/27/2020    BLOODU Negative 12/27/2020    SPECGRAV 1.020 12/27/2020    GLUCOSEU Negative 12/27/2020       Radiology:     CXR: I have reviewed the CXR with the following interpretation: none  EKG:  I have reviewed the EKG with the following interpretation: none    CT ABDOMEN PELVIS WO CONTRAST Additional Contrast? None   Preliminary Result   1. 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 There is at least partial bowel obstruction at the level of the lesion with   proximal colonic and small bowel distension. The lesion is best visualized   on series 601, image 38.   2. No other acute findings within the abdomen or pelvis. 3. Status post cholecystectomy and hysterectomy.          XR ABDOMEN (KUB) (SINGLE AP VIEW)    (Results Pending)       ASSESSMENT:    Active Hospital Problems    Diagnosis Date Noted    Colonic obstruction [facility]) [Y41.180] 12/27/2020         PLAN:    Suspected colon cancer, complicated by large bowel obstruction

## 2020-12-28 NOTE — BRIEF OP NOTE
Brief Postoperative Note      Patient: Tereza Caputo  YOB: 1940  MRN: [id number]    Date of Procedure: 12/28/2020    Pre-Op Diagnosis: OBSTRUCTION OF THE TRANSVERSE COLON MASS    Post-Op Diagnosis: Same       Procedure(s):  LEFT COLECTOMY WITH MOBILIZATION OF THE SPLENIC FLEXURE    Surgeon(s):  Desirae Hamilton MD    Assistant:  Surgical Assistant: Sudhir Moon    Anesthesia: General    Estimated Blood Loss (mL): less than 239     Complications: None    Specimens:   ID Type Source Tests Collected by Time Destination   A : LEFT COLON Tissue Tissue SURGICAL PATHOLOGY Desirae Hamilton MD 12/28/2020 1338        Implants:  * No implants in log *      Drains:   NG/OG/NJ/NE Tube Nasogastric 18 fr Left nostril (Active)   Surrounding Skin Dry; Intact 12/28/20 0533   Status Suction-low continuous 12/28/20 0533   Placement Verified by X-Ray (Initial) 12/27/20 1952   Drainage Appearance Milagros Stalker 12/28/20 0533   Output (mL) 300 ml 12/28/20 0533       Urethral Catheter 16 fr (Active)       Findings: as above    Electronically signed by Lito Payne MD on 12/28/2020 at 2:11 PM

## 2020-12-28 NOTE — PROGRESS NOTES
All patient belongings taken from [phone] to [phone], including patient cell phone and watch. Face to Face report given to C3 RN, [facility]. Writer called and notified pt daughter, Jocelyne Escobedo, that patient would not be returning to C5 post operatively and that she would be going to C3.

## 2020-12-28 NOTE — ED NOTES
Bedside report given to CHANDRA Linda. Pt in route to C5 via wheelchair at this time in stable condition. All belongings sent with pt. Care transferred.      Bandar Azevedo RN  12/27/20 4930

## 2020-12-28 NOTE — PROGRESS NOTES
Patient in SDS reviewed pre op checklist. Partials removed and in PACU. Updated Dr. Diana Maldonado regarding metoprolol held this am, no new orders at this time.

## 2020-12-28 NOTE — ED NOTES
Dr. Ora Troy to bedside and placed NGT to L nare. 62cm. Light brown gastric contents. KUB ordered for verification.      Trever Rangel RN  12/27/20 7986

## 2020-12-28 NOTE — CARE COORDINATION
CM walked into pt room and she was being wheeled down for sx. Pt had no needs in 2018, but will likely have needs post op. CM will assess pt post op for needs.   Rossy Anglin RN

## 2020-12-29 NOTE — CARE COORDINATION
CASE MANAGEMENT INITIAL ASSESSMENT      Reviewed chart and completed assessment via telephone with: Pt   Explained Case Management role/services. Primary contact information: [phone] : [place], [phone]      Can this person be reached and be able to respond quickly, such as within a few minutes or hours? Yes    Admit date/status: inpt 12/27/2020  Diagnosis: Colonic obstruction   Is this a Readmission?:  No      Insurance: [facility] Medicare   Precert required for SNF: No       3 night stay required: No    Living arrangements, Adls, care needs, prior to admission: Pt lives at home alone. Transportation: Pt to arrange     Durable Medical Equipment at home: N/A Walker__Cane__RTS__ BSC__Shower Chair__  02__ HHN__ CPAP__  BiPap__  Hospital Bed__ W/C___ Other__________    Services in the home and/or outpatient, prior to admission: N/A    PT/OT recs: Home with 24/7 [facility] Exemption Notification (HEN): N/A    Barriers to discharge: N/A    Plan/comments: Sw spoke with pt on this day who notes that she intends to discharge home to her daughters home for a little while. Pt's son lives in [place] and she notes that he will likely come up from [place] to stay with her in her home once she leaves her daughters. Antony called and spoke with pt's daughter Trina Kirkland on this day who shared the same plan with an unknown amount of time that she could accommodate pt. Daughter's address is [address], [address]    Referral was called to [facility] on this day as pt is open to [facility] and does not have preference on [facility] agency.       ECOC on chart for MD signature

## 2020-12-29 NOTE — CARE COORDINATION
[facility] unable to service patient. [facility] to follow for Mauri Mao needs.  Citlalli Jones RN

## 2020-12-29 NOTE — PROGRESS NOTES
Physical Therapy    Facility/Department: [facility] C3 TELE/MED SURG/ONC  Initial Assessment    NAME: Ctahy Mcnally  : 1940  MRN: [id number]    Date of Service: 2020    Discharge Recommendations:  Home with Home health PT, 24 hour supervision or assist      If pt discharges prior to next PT session this note will serve as discharge summary. Assessment   Body structures, Functions, Activity limitations: Decreased functional mobility ; Decreased endurance  Assessment: pt is 67 yo female adm for colonic obstruction, surgery . PLOF: lives alone with 6+6 steps to enter, indep amb and ADLs. Pt reports son may stay with her upon discharge, also considering going to her sister's home where there are no stairs. Today pt participated in therapeutic exercises, transfers, bed mob and ambulation with RW. Pt was lightheaded with amb and returned to bed post session. Pt will benefit from skilled PT to address post op deficits. Recommend 24 hr assist and home PT at discharge. Treatment Diagnosis: decreased gait and endurance  Specific instructions for Next Treatment: therapeutic ex, functional mob, gait  Prognosis: Good  Decision Making: Low Complexity  PT Education: Goals;Transfer Training;Disease Specific Education;PT Role;Functional Mobility Training;Plan of Care;General Safety;Gait Training  Patient Education: Pt educated in prevention of complications of bedrest post op, basic body mechanics to protect surgical site post op. Pt educated in use of RW to increase safety and stability.  Pt voices and demonstrates understanding of all instructions  Barriers to Learning: none  REQUIRES PT FOLLOW UP: Yes  Activity Tolerance  Activity Tolerance: Patient Tolerated treatment well;Patient limited by endurance  Activity Tolerance: Lightheaded during ambulation, /57 once supine post amb Patient Diagnosis(es): The primary encounter diagnosis was Small bowel obstruction ([address] Diagnoses of CHELSEA (acute kidney injury) ([address] Abdominal pain, lower, Abnormal CT of the abdomen, Hypokalemia, Hyponatremia, Non-intractable vomiting with nausea, unspecified vomiting type, and Complete intestinal obstruction, unspecified cause ([address] were also pertinent to this visit. has a past medical history of CKD (chronic kidney disease) stage 3, GFR 30-59 ml/min and Hypertension. has no past surgical history on file. Restrictions  Restrictions/Precautions  Restrictions/Precautions: Fall Risk, Up as Tolerated, General Precautions  Vision/Hearing  Vision: Within Functional Limits  Hearing: Within functional limits     Subjective  General  Chart Reviewed: Yes  Patient assessed for rehabilitation services?: Yes  Family / Caregiver Present: No  Referring Practitioner: Francis Cordero  Referral Date : 12/29/20  Diagnosis: Left colectomy with anastomosis and mobilization of splenicflexure on 12/28  Follows Commands: Within Functional Limits  General Comment  Comments: RN cleared pt for therapy, pt up in chair on approach  Subjective  Subjective: Pt agrees to therapy  Pain Screening  Patient Currently in Pain: Denies at rest. Reports \"Hurts a bit more\" upon sit to supine. Pain relieved with rest and emotional support provided.   Vital Signs 116/67 post amb taken L wrist     Orientation  Overall Orientation Status: Within Normal Limits  Social/Functional History  Social/Functional History  Lives With: Alone(son will stay with her upon discharge, she is also considering staying at daughters home where there are no stairs)  Home Layout: Two level  Home Access: Stairs to enter with rails  Entrance Stairs - Number of Steps: 6+6  Bathroom Shower/Tub: Tub/Shower unit  Bathroom Toilet: Standard    Objective     RLE AROM: WFL  LLE AROM : WFL  Strength RLE: WFL  Strength LLE: WFL        Bed mobility Supine to Sit: Unable to assess(up in chair on approach)  Sit to Supine: Minimal assistance(for LEs)  Scooting: Minimal assistance  Transfers  Sit to Stand: Minimal Assistance  Stand to sit: Contact guard assistance  Ambulation  Surface: level tile  Device: Rolling Walker  Assistance: Contact guard assistance  Quality of Gait: very slow pace with shortened stride, minimal foot clearance, reports lightheadedness with amb  Distance: 25 ft  Comments: /57 once returned to supine post amb        Exercises  Gluteal Sets: 5 x B  Hip Flexion: 10 x B  Knee Long Arc Quad: 10 x B  Ankle Pumps: Seated HR/TR 10 x B  Upper Extremity: Alternate UE row with Upper Trunk Rotation: 10 x B     Plan   Times per week: 3-5 x week  Specific instructions for Next Treatment: therapeutic ex, functional mob, gait  Current Treatment Recommendations: Endurance Training, Gait Training, Functional Mobility Training, Safety Education & Training, Home Exercise Program, Stair training  Safety Devices  Type of devices:  All fall risk precautions in place, Bed alarm in place, Left in bed, Call light within reach, Nurse notified, Patient at risk for falls  AM-PAC Score  AM-PAC Inpatient Mobility Raw Score : 17 (12/29/20 1055)  AM-PAC Inpatient T-Scale Score : 42.13 (12/29/20 1055)  Mobility Inpatient CMS 0-100% Score: 50.57 (12/29/20 1055)  Mobility Inpatient CMS G-Code Modifier : CK (12/29/20 1055)     Goals  Short term goals  Time Frame for Short term goals: 1 week (1/5) unless otherwise specified  Short term goal 1: pt to perform bed mob supervised  Short term goal 2: pt to perform transfers supervised  Short term goal 3: pt to amb with  ft SBA  Short term goal 4: pt to negotiate 4 stairs with rails and CG  Short term goal 5: pt to participate in 10-12 reps LE ex by 12/31  Patient Goals   Patient goals : \"to be able to walk with walker and feel stable\"     Therapy Time   Individual Concurrent Group Co-treatment   Time In 1020 Time Out 1055         Minutes 35         Timed Code Treatment Minutes: [address], PT

## 2020-12-29 NOTE — PROGRESS NOTES
[facility] GENERAL SURGERY    Surgery Progress Note           POD # 1    PATIENT NAME: Jagdeep Tai     TODAY'S DATE: 12/29/2020    INTERVAL HISTORY:    Pt tired, ngt discomfort. No flatus. OBJECTIVE:   VITALS:  BP (!) 116/57   Pulse 87   Temp 97.9 °F (36.6 °C) (Oral)   Resp 16   Ht 5' 4\" (1.626 m)   Wt 210 lb 5.1 oz (95.4 kg)   SpO2 96%   BMI 36.10 kg/m²     INTAKE/OUTPUT:    I/O last 3 completed shifts: In: 1918 [P.O.:10; I.V.:1908]  Out: 1950 [Urine:1450; Emesis/NG output:400; Blood:100]  No intake/output data recorded. CONSTITUTIONAL:  awake and alert  LUNGS:  no crackles or wheezing  ABDOMEN:   hypoactive bowel sounds, soft, non-distended, tenderness noted appropriate, onQ ball in place   INCISION: clean, dry    Data:  CBC:   Recent Labs     12/27/20  1603 12/28/20  0651 12/29/20  1104   WBC 8.8 8.2 12.2*   HGB 12.4 12.1 11.9*   HCT 36.7 36.6 37.5    248 295     BMP:    Recent Labs     12/27/20  1603 12/28/20  0651   * 131*   K 3.3* 2.8*   CL 89* 96*   CO2 24 24   BUN 96* 83*   CREATININE 2.6* 2.1*   GLUCOSE 115* 100*     Hepatic:   Recent Labs     12/27/20  1603   AST 35   ALT 17   BILITOT 0.4   ALKPHOS 102     Mag:      Recent Labs     12/28/20  0651   MG 1.60*      Path: pending    ASSESSMENT AND PLAN:  68 y.o. female status post Left colectomy with anastomosis and mobilization of splenic  Flexure. GI: trial clamping and possibly removing ngt  : continue with castano to monitor I/O today  Pain:  Well controlled   Activity: OOB to chair, PT/OT  Path: pending  CHELSEA: AM labs pending    Electronically signed by Branson Gosselin, APRN - CNP     Surgery Staff    I have examined this patient and read and agree with the note by Branson Gosselin, CNP from today. Await pathology report and return of bowel function.     [employer]

## 2020-12-29 NOTE — DISCHARGE INSTR - COC
Continuity of Care Form    Patient Name: Anam Osorio   :  1940  MRN:  [id number]    Admit date:  2020  Discharge date:  ***    Code Status Order: Full Code   Advance Directives:   Advance Care Flowsheet Documentation     Date/Time Healthcare Directive Type of Healthcare Directive Copy in [address] Agent's Name Healthcare Agent's Phone Number    20 1204  No, patient does not have an advance directive for healthcare treatment -- -- -- -- --          Admitting Physician:  Florentino Alberto MD  PCP: Ish Vo MD    Discharging Nurse: [facility] Unit/Room#: 3914/6302-70  Discharging Unit Phone Number: ***    Emergency Contact:   Extended Emergency Contact Information  Primary Emergency Contact: [address] Phone: [phone]  Relation: Brother/Sister  Secondary Emergency Contact: Sarah Weldon  Home Phone: [phone]  Work Phone: [phone]  Relation: Brother/Sister    Past Surgical History:  History reviewed. No pertinent surgical history. Immunization History: There is no immunization history on file for this patient.     Active Problems:  Patient Active Problem List   Diagnosis Code    Bronchopneumonia J18.0    Hypoxia R09.02    CKD (chronic kidney disease) N18.9    Obesity E66.9    Colonic obstruction ([facility] [address] K56.609       Isolation/Infection:   Isolation          No Isolation        Patient Infection Status     Infection Onset Added Last Indicated Last Indicated By Review Planned Expiration Resolved Resolved By    None active    Resolved    COVID-19 Rule Out 20 COVID-19 (Ordered)   20 Rule-Out Test Resulted          Nurse Assessment:  Last Vital Signs: BP (!) 116/57   Pulse 87   Temp 97.9 °F (36.6 °C) (Oral)   Resp 16   Ht 5' 4\" (1.626 m)   Wt 210 lb 5.1 oz (95.4 kg)   SpO2 96%   BMI 36.10 kg/m²     Last documented pain score (0-10 scale): Pain Level: 9  Last Weight: Wt Readings from Last 1 Encounters:   20 210 lb 5.1 oz (95.4 kg)     Mental Status:  {IP PT MENTAL STATUS:}    IV Access:  { OTILIA IV ACCESS:508328473}    Nursing Mobility/ADLs:  Walking   {CHP DME QGBA:598832675}  Transfer  {CHP DME FMBN:721775163}  Bathing  {CHP DME QOLS:008317130}  Dressing  {CHP DME RTIN:783778916}  Toileting  {CHP DME GOUI:163226513}  Feeding  {CHP DME CDIQ:239128500}  Med Admin  {CHP DME AVCU:323310215}  Med Delivery   { OTILIA MED Delivery:301559798}    Wound Care Documentation and Therapy:        Elimination:  Continence:   · Bowel: {YES / CI:38496}  · Bladder: {YES / SC:38924}  Urinary Catheter: {Urinary Catheter:387817565}   Colostomy/Ileostomy/Ileal Conduit: {YES / TL:30442}       Date of Last BM: ***    Intake/Output Summary (Last 24 hours) at 2020 1220  Last data filed at 2020 0424  Gross per 24 hour   Intake 1918 ml   Output 1950 ml   Net -32 ml     I/O last 3 completed shifts:   In:  [P.O.:10; I.V.:]  Out:  [Urine:1450; Emesis/NG output:400; Blood:100]    Safety Concerns:     [address] [employer] Safety Concerns:738967547}    Impairments/Disabilities:      [address] [employer] Impairments/Disabilities:074557489}    Nutrition Therapy:  Current Nutrition Therapy:   [address] [employer] Diet List:718516416}    Routes of Feeding: {CHP DME Other Feedings:332048106}  Liquids: {Slp liquid thickness:66405}  Daily Fluid Restriction: {CHP DME Yes amt example:899758846}  Last Modified Barium Swallow with Video (Video Swallowing Test): {Done Not Done KJJO:102141646}    Treatments at the Time of Hospital Discharge:   Respiratory Treatments: ***  Oxygen Therapy:  {Therapy; copd oxygen:75831}  Ventilator:    { CC Vent AMBA:403890912}    Rehab Therapies: {THERAPEUTIC INTERVENTION:8170347120}  Weight Bearing Status/Restrictions: [address] Weight Bearin}  Other Medical Equipment (for information only, NOT a DME order):  {EQUIPMENT:145702911}  Other Treatments: *** Patient's personal belongings (please select all that are sent with patient):  {CHP DME Belongings:592551769}    RN SIGNATURE:  {Esignature:382173801}    CASE MANAGEMENT/SOCIAL WORK SECTION    Inpatient Status Date: ***    Readmission Risk Assessment Score:  Readmission Risk              Risk of Unplanned Readmission:        14           Discharging to Facility/ Agency   · Name: [address]  · Address:  · Phone:[phone]  · Fax:[phone]      / signature: {Esignature:360840338}    PHYSICIAN SECTION    Prognosis: {Prognosis:4984431639}    Condition at Discharge: [address] [place] [address] Patient Condition:884122183}    Rehab Potential (if transferring to Rehab): {Prognosis:8464508223}    Recommended Labs or Other Treatments After Discharge: ***  Recommended Follow-up, Labs or Other Treatments After Discharge:    ***             Physician Certification: I certify the above information and transfer of Jessee Lawson  is necessary for the continuing treatment of the diagnosis listed and that she requires {Admit to Appropriate Level of Care:39825} for {GREATER/LESS:087167511} 30 days.      Update Admission H&P: {CHP DME Changes in FGDOX:712197885}    PHYSICIAN SIGNATURE:  {Esignature:610897440}

## 2020-12-29 NOTE — CARE COORDINATION
[facility]    Referral received from  to follow for home care services.    [facility] unable to staff timely    [facility] accepted    Thais Smith RN, BSN CTN  [facility] [phone]

## 2020-12-29 NOTE — PROGRESS NOTES
Occupational Therapy   Occupational Therapy Initial Assessment  Date: 2020   Patient Name: Jessee Lawson  MRN: [id number]     : 1940    Date of Service: 2020    Discharge Recommendations:  24 hour supervision or assist, Home with Home health OT, Home with nursing aide  OT Equipment Recommendations  Equipment Needed: Yes  Mobility Devices: ADL Assistive Devices  ADL Assistive Devices: Transfer Tub Bench;Reacher  Other: Pt would benefit from tub transfer bench to prevent falls as well as increase safety and independence with tub/shower transfers and bathing due to deficits in balance, activity tolerance. Assessment   Performance deficits / Impairments: Decreased functional mobility ; Decreased balance;Decreased ADL status; Decreased endurance;Decreased high-level IADLs  Assessment: Pt with good tolerance of OT session. Pt limited due to pain. At baseline pt lives alone,  independent with I/ADLs, ambulates without AD. Currently pt requiring Min-CGA with functional transfers, ModA bed mobility, Max-Min A with simple ADLs. Pt is currently not at baseline level of occupational performance. Recommend continued OT services to increase safety and independence with ADLs and functional transfers. Pt is not yet safe to return home, but anticipate pt will be safe to return home with strict 24/7 supervision/assist and HHOT upon discharge. Pt reports that she plans to stay at daughter's home where daughter can provide 24/7 supervision/assist.  Prognosis: Good  Decision Making: Medium Complexity    OT Education: OT Role;Plan of Care;Transfer Training;Energy Conservation; ADL Adaptive Strategies  Patient Education: log roll, importance of OOB activity, compensatory LB dressing, home safety concerns, OT discharge recommendations  Barriers to Learning: pt demonstrates and verbalizes understanding.   REQUIRES OT FOLLOW UP: Yes    Activity Tolerance Activity Tolerance: Patient Tolerated treatment well;Patient limited by fatigue;Patient limited by pain  Activity Tolerance: BP supine: 150/65, BP seated 142/85    Safety Devices  Safety Devices in place: Yes  Type of devices: Call light within reach; Left in chair;Chair alarm in place;Nurse notified           Patient Diagnosis(es): The primary encounter diagnosis was Small bowel obstruction ([facility] [address] Diagnoses of CHELSEA (acute kidney injury) ([address] Abdominal pain, lower, Abnormal CT of the abdomen, Hypokalemia, Hyponatremia, Non-intractable vomiting with nausea, unspecified vomiting type, and Complete intestinal obstruction, unspecified cause ([address] were also pertinent to this visit. has a past medical history of CKD (chronic kidney disease) stage 3, GFR 30-59 ml/min and Hypertension. has no past surgical history on file. Restrictions  Restrictions/Precautions  Restrictions/Precautions: Fall Risk, Up as Tolerated, General Precautions  Position Activity Restriction  Other position/activity restrictions: NG, NPO, up as tolerated    Subjective   General  Chart Reviewed: Yes  Patient assessed for rehabilitation services?: Yes  Additional Pertinent Hx: CKD  Family / Caregiver Present: No    Diagnosis: Pt admitted with suspected colon cancer and obstruction of transverse colon mass s/p L colectomy with mobilization of splenic flexure on 12/28. Pt also with CHELSEA due to dehydration, and hyponatremia. Subjective  Subjective: Pt supine in bed upon OT arrival. Pt reports that she felt a little lightheaded when she got up earlier, but denies any lightheadedness during OT session. General Comment  Comments: RN Agreeable to OT session.     Patient Currently in Pain: Yes  Pain Assessment  Pain Assessment: 0-10  Pain Level: 3  Patient's Stated Pain Goal: 1  Pain Type: Surgical pain  Pain Location: Abdomen  Pain Descriptors: Aching  Pain Frequency: Continuous  Pain Onset: On-going Grooming: Minimal assistance;Setup(pt rodriguez hair, washes face/hands, brushes teeth seated EOB. Pt requirng partial assist to comb hair.)  LE Dressing: Maximum assistance(doff/don socks. Pt able to doff socks, but requires assist to don both socks. Extra time)     Tone RUE  RUE Tone: Normotonic  Tone LUE  LUE Tone: Normotonic  Coordination  Movements Are Fluid And Coordinated: Yes        Bed mobility  Supine to Sit: Moderate assistance(bedrail, partial assist for 1LE and trunk)     Transfers  Stand Step Transfers: Minimal assistance  Sit to stand: Contact guard assistance  Stand to sit: Contact guard assistance  Transfer Comments: to/from EOB and chair using RW. Cues for hand placement. Vision - Basic Assessment  Prior Vision: No visual deficits  Visual History: Cataracts;Surgical intervention  Visual Field Cut: No  Oculo Motor Control:  WNL     Cognition  Overall Cognitive Status: WFL           Sensation  Overall Sensation Status: WNL          LUE AROM (degrees)  LUE AROM : WNL  RUE AROM (degrees)  RUE AROM : WNL     LUE Strength  Gross LUE Strength: WFL  L Hand General: 5/5  RUE Strength  Gross RUE Strength: WFL  R Hand General: 5/5             Plan   Plan  Times per week: 3-5x/week  Current Treatment Recommendations: Strengthening, Pain Management, Positioning, Gait Training, Safety Education & Training, Balance Training, Patient/Caregiver Education & Training, Self-Care / ADL, Functional Mobility Training, Equipment Evaluation, Education, & procurement, Home Management Training, Endurance Training    AM-PAC Score        AM-[facility] Inpatient Daily Activity Raw Score: 15 (12/29/20 1515)  AM-PAC Inpatient ADL T-Scale Score : 34.69 (12/29/20 1515)  ADL Inpatient CMS 0-100% Score: 56.46 (12/29/20 1515)  ADL Inpatient CMS G-Code Modifier : CK (12/29/20 1515)    Goals  Short term goals  Time Frame for Short term goals: 2 weeks (1/12/21)  Short term goal 1: Supervision functional transfers to/from EOB, chair, toilet Short term goal 2: Min A LB dressing with AE as needed  Short term goal 3: SBA toileting  Short term goal 4: SBA grooming in stance at sink  Patient Goals   Patient goals : to be able to walk to the bathroom on her own       Therapy Time   Individual Concurrent Group Co-treatment   Time In 1404         Time Out 1450         Minutes 46         Timed Code Treatment Minutes: 31 Minutes(15 minute evaluation)     If pt is discharged prior to next OT session, this note will serve as the discharge summary.     Zeb Guidry, OT

## 2020-12-29 NOTE — PROGRESS NOTES
Shift assessment completed and charted. VSS. 2L NC stat 95%. NG to CLWS. Pain ball with bloody drainage noted around, in place. Midline surgical incision, old drainage noted on dressing. F/C draining clear yellow urine. SCDs in place. Bed locked and in lowest position. Call light within reach. Pt denies any other needs at this time. Will continue to monitor.

## 2020-12-29 NOTE — PROGRESS NOTES
Hospitalist Progress Note      PCP: Ish Vo MD    Date of Admission: 12/27/2020    Chief Complaint: Constipation, abdominal pain, n/v    Hospital Course: Reviewed H&P     Subjective: Patient S/p left colectomy with anastomosis and mobilization of splenic flexure on 12/27/2020 for obstructing transverse colon mass. Tolerated surgery well. Hemodynamically stable this morning. Labs pending. Currently requiring 2 L/min oxygen by nasal cannula. Net +1.7 L thus far. Medications:  Reviewed    Infusion Medications    bupivacaine 0.5%      sodium chloride 100 mL/hr at 12/28/20 2127     Scheduled Medications    metoprolol succinate  25 mg Oral Daily    sodium chloride flush  10 mL Intravenous 2 times per day    heparin (porcine)  5,000 Units Subcutaneous 3 times per day     PRN Meds: sodium chloride flush, polyethylene glycol, acetaminophen **OR** acetaminophen, prochlorperazine      Intake/Output Summary (Last 24 hours) at 12/29/2020 0829  Last data filed at 12/29/2020 0424  Gross per 24 hour   Intake 1918 ml   Output 1950 ml   Net -32 ml       Physical Exam Performed:  /77   Pulse 84   Temp 98.8 °F (37.1 °C) (Oral)   Resp 16   Ht 5' 4\" (1.626 m)   Wt 210 lb 5.1 oz (95.4 kg)   SpO2 95%   BMI 36.10 kg/m²     General appearance: No apparent distress, appears stated age and cooperative. HEENT: Pupils equal, round, and reactive to light. Conjunctivae/corneas clear. Neck: Supple, with full range of motion. No jugular venous distention. Trachea midline. Respiratory:  Normal respiratory effort. Clear to auscultation, bilaterally without Rales/Wheezes/Rhonchi. Cardiovascular: Regular rate and rhythm with normal S1/S2 without murmurs, rubs or gallops. Abdomen: hypoactive bowel sounds, soft, non-distended, tenderness noted appropriate, onQ ball in place   Musculoskeletal: No clubbing, cyanosis or edema bilaterally. Full range of motion without deformity. Skin: Skin color, texture, turgor normal.  No rashes or lesions. Neurologic:  Neurovascularly intact without any focal sensory/motor deficits. Cranial nerves: II-XII intact, grossly non-focal.  Psychiatric: Alert and oriented, thought content appropriate, normal insight  Capillary Refill: Brisk,< 3 seconds   Peripheral Pulses: +2 palpable, equal bilaterally       Labs:   Recent Labs     12/27/20  1603 12/28/20  0651   WBC 8.8 8.2   HGB 12.4 12.1   HCT 36.7 36.6    248     Recent Labs     12/27/20  1603 12/28/20  0651   * 131*   K 3.3* 2.8*   CL 89* 96*   CO2 24 24   BUN 96* 83*   CREATININE 2.6* 2.1*   CALCIUM 8.3 8.2*     Recent Labs     12/27/20  1603   AST 35   ALT 17   BILITOT 0.4   ALKPHOS 102     Urinalysis:    Lab Results   Component Value Date    NITRU Negative 12/27/2020    BLOODU Negative 12/27/2020    SPECGRAV 1.020 12/27/2020    GLUCOSEU Negative 12/27/2020       Radiology:  XR ABDOMEN (KUB) (SINGLE AP VIEW)   Final Result   Gastric tube in the stomach         CT ABDOMEN PELVIS WO CONTRAST Additional Contrast? None   Final Result   1. 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 There is at least partial bowel obstruction at the level of the lesion with   proximal colonic and small bowel distension. The lesion is best visualized   on series 601, image 38.   2. No other acute findings within the abdomen or pelvis. 3. Status post cholecystectomy and hysterectomy. Active Hospital Problems    Diagnosis Date Noted    Colonic obstruction [facility]) [O03.192] 12/27/2020     Assessment/Plan:  Suspected colon cancer, complicated by large bowel obstruction POA  - surgery consulted - S/p left colectomy with anastomosis and mobilization of splenic flexure on 12/27/2020 for obstructing transverse colon mass on 12/28/20 . POD # 1 .   -Currently on NG tube to LIS. Noted surgical plans for trial clamping and possibly removing NG tube today.   -Continue pain control.

## 2020-12-30 NOTE — CARE COORDINATION
Chart reviewed day 2. POD 2. Spoke with [facility] NP with surgeons. Started clear liquids today. Patient planning to go to daughters home at discharge. [facility] following for [facility] needs.  Arron Sesay RN

## 2020-12-30 NOTE — PROGRESS NOTES
Hospitalist Progress Note      PCP: Zahira Moya MD    Date of Admission: 12/27/2020    Chief Complaint: Constipation, abdominal pain, n/v    Hospital Course: Reviewed H&P     Subjective: Patient S/p left colectomy with anastomosis and mobilization of splenic flexure on 12/27/2020 for obstructing transverse colon mass. Tolerated surgery well. Hemodynamically stable this morning. Labs with improving Tangela fx . Currently  Off NG tube and Surgery allowing Clear liquids today . Surgical Bx pending . Patient offers no new complaints     Medications:  Reviewed    Infusion Medications    bupivacaine 0.5%      sodium chloride 75 mL/hr at 12/30/20 0931     Scheduled Medications    metoprolol succinate  25 mg Oral Daily    sodium chloride flush  10 mL Intravenous 2 times per day    heparin (porcine)  5,000 Units Subcutaneous 3 times per day     PRN Meds: HYDROmorphone, sodium chloride flush, polyethylene glycol, acetaminophen **OR** acetaminophen, prochlorperazine      Intake/Output Summary (Last 24 hours) at 12/30/2020 1208  Last data filed at 12/30/2020 0536  Gross per 24 hour   Intake 2123 ml   Output 1075 ml   Net 1048 ml       Physical Exam Performed:  /74   Pulse 94   Temp 98.5 °F (36.9 °C) (Oral)   Resp 16   Ht 5' 4\" (1.626 m)   Wt 210 lb 5.1 oz (95.4 kg)   SpO2 91%   BMI 36.10 kg/m²     General appearance: No apparent distress, appears stated age and cooperative. HEENT: Pupils equal, round, and reactive to light. Conjunctivae/corneas clear. Neck: Supple, with full range of motion. No jugular venous distention. Trachea midline. Respiratory:  Normal respiratory effort. Clear to auscultation, bilaterally without Rales/Wheezes/Rhonchi. Cardiovascular: Regular rate and rhythm with normal S1/S2 without murmurs, rubs or gallops.   Abdomen: hypoactive bowel sounds, soft, non-distended, tenderness noted appropriate, onQ ball in place  Musculoskeletal: No clubbing, cyanosis or edema bilaterally. Full range of motion without deformity. Skin: Skin color, texture, turgor normal.  No rashes or lesions. Neurologic:  Neurovascularly intact without any focal sensory/motor deficits. Cranial nerves: II-XII intact, grossly non-focal.  Psychiatric: Alert and oriented, thought content appropriate, normal insight  Capillary Refill: Brisk,< 3 seconds   Peripheral Pulses: +2 palpable, equal bilaterally      Labs:   Recent Labs     12/28/20  0651 12/29/20  1104 12/30/20  0547   WBC 8.2 12.2* 11.6*   HGB 12.1 11.9* 10.4*   HCT 36.6 37.5 32.2*    295 283     Recent Labs     12/28/20  0651 12/29/20  1104 12/30/20  0547   * 135* 139   K 2.8* 3.8 3.5   CL 96* 100 103   CO2 24 21 24   BUN 83* 49* 38*   CREATININE 2.1* 1.6* 1.4*   CALCIUM 8.2* 9.0 9.1     Recent Labs     12/27/20  1603   AST 35   ALT 17   BILITOT 0.4   ALKPHOS 102     Urinalysis:    Lab Results   Component Value Date    NITRU Negative 12/27/2020    BLOODU Negative 12/27/2020    SPECGRAV 1.020 12/27/2020    GLUCOSEU Negative 12/27/2020       Radiology:  XR ABDOMEN (KUB) (SINGLE AP VIEW)   Final Result   Gastric tube in the stomach         CT ABDOMEN PELVIS WO CONTRAST Additional Contrast? None   Final Result   1. 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 There is at least partial bowel obstruction at the level of the lesion with   proximal colonic and small bowel distension. The lesion is best visualized   on series 601, image 38.   2. No other acute findings within the abdomen or pelvis. 3. Status post cholecystectomy and hysterectomy.                Active Hospital Problems    Diagnosis Date Noted    Small bowel obstruction [facility]) [F96.751] 12/27/2020     Assessment/Plan:  Suspected colon cancer, complicated by large bowel obstruction POA

## 2020-12-30 NOTE — PROGRESS NOTES
I have examined this patient and read and agree with the note by Priscilla Downs CNP from today. Agree w/ current plan to trial liquids slowly (pt notes mild N/V this AM after trying clears) until good return of bowel function. Pathology:  FINAL DIAGNOSIS:     Left colon, segmental resection:        - Invasive, moderately differentiated colonic adenocarcinoma - tumor        mass is 4.0 cm in maximal dimensions.      - Tumor extends through the entire colonic wall into pericolonic        adipose tissue.      - The proximal, distal and radial resection margins are negative.      - Seven benign pericolonic lymph nodes; see synoptic report and        comment.      - (the pericolonic tissue will be cleared with lymph node clearing        solution and examined for additional lymph nodes, with an addendum        being issued). Will consult Oncology in 1-2 days to review pathology and discuss possible adjuvant therapy options.       YUMIKO Leeo REYES

## 2020-12-30 NOTE — PROGRESS NOTES
Patient in bed awake. A/O x 4. Assessment completed and charted. VSS. Respirations even and unlabored. No complaints regarding NG removal done on previous shift. Denies pain. Midline incision dressing in place. Old sanguinous drainage present. Bed in lowest position and locked. Call light in reach.

## 2020-12-30 NOTE — PROGRESS NOTES
Occupational Therapy  Facility/Department: [facility] C3 TELE/MED SURG/ONC  Daily Treatment Note  NAME: Li Reeves  : 1940  MRN: [id number]    Date of Service: 2020    Discharge Recommendations:  24 hour supervision or assist, Home with Home health OT, Home with nursing aide  OT Equipment Recommendations  Equipment Needed: Yes  Mobility Devices: ADL Assistive Devices  ADL Assistive Devices: Transfer Tub Bench;Reacher  Other: Pt would benefit from tub transfer bench to prevent falls as well as increase safety and independence with tub/shower transfers and bathing due to deficits in balance, activity tolerance. Assessment   Performance deficits / Impairments: Decreased functional mobility ; Decreased balance;Decreased ADL status; Decreased endurance;Decreased high-level IADLs  Assessment: Pt tolerated OT session well. At baseline pt lives alone, independent with I/ADLs, ambulates without AD. Pt progressing with standing tolerance/balance using RW. Pt currently requiring CGA with functional transfers and SBA-CGA with ambulation using RW. Pt tolerated 2 minutes standing grooming at sink, but required sitting to complete grooming tasks. Pt continuing to require Mod A for supine to EOB transfer. Pt remains below baseline level of occupational performance. Recommend continued OT services to increase safety and independence with ADLs and functional transfers. Anticipate pt will be safe to discharge home with strict 24/7 supervision/assist and HHOT. Pt reports plan to discharge to daughter's home where she will have 24/7 supervision/assist available. Prognosis: Good      OT Education: OT Role;Plan of Care;Transfer Training;Energy Conservation; ADL Adaptive Strategies  Patient Education: log roll, importance of OOB activity, home safety concerns, OT discharge recommendations, hand placement for transfers  Barriers to Learning: pt demonstrates and verbalizes understanding.   REQUIRES OT FOLLOW UP: Yes Grooming: Minimal assistance;Setup(pt brushes teeth standing at sink x2 minutes SBA leaning on counter. Pt sits to wash hands/face. OT assist to comb hair due to fatigue.)           Balance  Sitting Balance: Independent  Standing Balance: Contact guard assistance(CGA-SBA with RW)    Standing Balance  Time: 2 minutes x1, 1minut x1  Activity: standing at sink for grooming, bathroom mobility, ambulation 20 feet in room  Comment: CGA with RW, emerging SBA. Functional Mobility  Activity: To/from bathroom  Functional Mobility Comments: CGA using RW 10 feet x1, 20 feet x1. Pt emerging SBA during straight pathways with RW    Bed mobility  Supine to Sit: Moderate assistance(bedrail, log roll, compression pillow, partial assist for BLE and trunk)     Transfers  Stand Step Transfers: Contact guard assistance  Sit to stand: Contact guard assistance  Transfer Comments: to/from EOB and chair x4 reps using RW.  Pt requiring 2 VC for hand placement        Cognition  Overall Cognitive Status: [facility]     Plan   Plan  Times per week: 3-5x/week  Current Treatment Recommendations: Strengthening, Pain Management, Positioning, Gait Training, Safety Education & Training, Balance Training, Patient/Caregiver Education & Training, Self-Care / ADL, Functional Mobility Training, Equipment Evaluation, Education, & procurement, Home Management Training, Endurance Training    AM-PAC Score        AM-[facility] Inpatient Daily Activity Raw Score: 16 (12/30/20 1136)  AM-PAC Inpatient ADL T-Scale Score : 35.96 (12/30/20 1136)  ADL Inpatient CMS 0-100% Score: 53.32 (12/30/20 1136)  ADL Inpatient CMS G-Code Modifier : CK (12/30/20 1136)    Goals  Short term goals  Time Frame for Short term goals: 2 weeks (1/12/21)  Short term goal 1: Supervision functional transfers to/from EOB, chair, toilet; ongoing, 12/30 CGA using RW  Short term goal 2: Min A LB dressing with AE as needed; ongoing  Short term goal 3: SBA toileting; ongoing Short term goal 4: SBA grooming in stance at sink; ongoing, 12/30 SBA 2 minutes standing at sink, requires sitting to complete grooming  Patient Goals   Patient goals : to be able to walk to the bathroom on her own       Therapy Time   Individual Concurrent Group Co-treatment   Time In 1037         Time Out 1124         Minutes 47         Timed Code Treatment Minutes: 52 Minutes     If pt is discharged prior to next OT session, this note will serve as the discharge summary.     Parvez Marie, OT

## 2020-12-30 NOTE — ANESTHESIA POSTPROCEDURE EVALUATION
Department of Anesthesiology  Postprocedure Note    Patient: Sydni Rodriguez  MRN: [id number]  YOB: 1940  Date of evaluation: 12/30/2020  Time:  6:58 AM     Procedure Summary     Date: 12/28/20 Room / Location: [facility] OR [address] [place] [address]    Anesthesia Start: 9219 Anesthesia Stop: 3254    Procedure: BOWEL RESECTION EXTENDED RIGHT COLECTOMY (Right Abdomen) Diagnosis:       Complete intestinal obstruction, unspecified cause ([address]      (OBSTRUCTION OF THE TRANSVERSE COLON MASS)    Surgeons: Nora Gupta MD Responsible Provider: Haritha Braun MD    Anesthesia Type: general ASA Status: 3          Anesthesia Type: general    Rosette Phase I: Rosette Score: 9    Rosette Phase II:      Last vitals: Reviewed and per EMR flowsheets.        Anesthesia Post Evaluation    Comments: Postoperative Anesthesia Note    Name:    Sydni Rodriguez  MRN:      [id number]    Patient Vitals in the past 12 hrs:  12/29/20 2348, BP:136/85, Temp:98.9 °F (37.2 °C), Temp src:Oral, Pulse:88, Resp:18, SpO2:92 %     LABS:    CBC  Lab Results       Component                Value               Date/Time                  WBC                      11.6 (H)            12/30/2020 05:47 AM        HGB                      10.4 (L)            12/30/2020 05:47 AM        HCT                      32.2 (L)            12/30/2020 05:47 AM        PLT                      283                 12/30/2020 05:47 AM   RENAL  Lab Results       Component                Value               Date/Time                  NA                       139                 12/30/2020 05:47 AM        K                        3.5                 12/30/2020 05:47 AM        CL                       103                 12/30/2020 05:47 AM        CO2                      24                  12/30/2020 05:47 AM        BUN                      38 (H)              12/30/2020 05:47 AM        CREATININE               1.4 (H)             12/30/2020 05:47 AM GLUCOSE                  104 (H)             12/30/2020 05:47 AM   COAGS  No results found for: PROTIME, INR, APTT    Intake & Output:  @[id number]@    Nausea & Vomiting:  No    Level of Consciousness:  Awake    Pain Assessment:  Adequate analgesia    Anesthesia Complications:  No apparent anesthetic complications    SUMMARY      Vital signs stable  OK to discharge from Stage I post anesthesia care.   Care transferred from Anesthesiology department on discharge from perioperative area

## 2020-12-31 NOTE — CONSULTS
ONCOLOGY HEMATOLOGY CARE CONSULT NOTE      Requesting Physician:  Dr. Iggy Will:  Colon cancer. HISTORY OF PRESENT ILLNESS:      Ms. Dunia Ventura  is a 68 y.o. female we are seeing in consultation for colon cancer. Transverse colon cancer:  1. Admitted to [facility] with LBO, 12/27/2020. Had not ever had a colonoscopy. A. CT abd/pelvis, 12/27/2020, showed partial colonic obstruction in the mid transverse colon. B. Left colectomy with anastomosis and mobilization of the splenic flexure, 12/28/2020, with Dr. Tyler Salgado. Pathology showed a 4 cm grade 2 adenocarcinoma. The tumor invaded through the muscularis propria and into pericolonic tissue. 7 neg LNs. Margins neg. Afebrile and on room air. She is on a clear liquid diet. But vomited several times since yesterday.       Past Medical History:    Past Medical History:   Diagnosis Date    CKD (chronic kidney disease) stage 3, GFR 30-59 ml/min     Hypertension      Past Surgical History:    Past Surgical History:   Procedure Laterality Date    HEMICOLECTOMY Right 12/28/2020    BOWEL RESECTION EXTENDED RIGHT COLECTOMY performed by Aubrie Santillan MD at [facility] OR       Current Medications:    Current Facility-Administered Medications   Medication Dose Route Frequency Provider Last Rate Last Admin    HYDROmorphone (DILAUDID) injection 0.25 mg  0.25 mg Intravenous Q3H PRN Adalid Burnham APRN - CNP        bupivacaine 0.5% (MARCAINE) elastomeric infusion 270 mL  270 mL Infiltration Continuous Aubrie Santillan MD        metoprolol succinate (TOPROL XL) extended release tablet 25 mg  25 mg Oral Daily Aubrie Santillan MD        0.9 % sodium chloride infusion   Intravenous Continuous Juliet La MD 75 mL/hr at 12/30/20 2154 New Bag at 12/30/20 2154    sodium chloride flush 0.9 % injection 10 mL  10 mL Intravenous 2 times per day Aubrie Santillan MD   10 mL at 12/30/20 2042  sodium chloride flush 0.9 % injection 10 mL  10 mL Intravenous PRN Lilia Johnston MD        polyethylene glycol (GLYCOLAX) packet 17 g  17 g Oral Daily PRN Lilia Johnston MD        acetaminophen (TYLENOL) tablet 650 mg  650 mg Oral Q6H PRN Lilia Johnston MD        Or    acetaminophen (TYLENOL) suppository 650 mg  650 mg Rectal Q6H PRN Lilia Johnston MD        prochlorperazine (COMPAZINE) injection 10 mg  10 mg Intravenous Q6H PRN Lilia Johnston MD   10 mg at 12/31/20 0428    heparin (porcine) injection 5,000 Units  5,000 Units Subcutaneous 3 times per day Lilia Johnston MD   5,000 Units at 12/31/20 7871     Allergies:  No Known Allergies    Social History:   Social History     Socioeconomic History    Marital status:      Spouse name: Not on file    Number of children: Not on file    Years of education: Not on file    Highest education level: Not on file   Occupational History    Not on file   Social Needs    Financial resource strain: Not on file    Food insecurity     Worry: Not on file     Inability: Not on file   [employer] needs     Medical: Not on file     Non-medical: Not on file   Tobacco Use    Smoking status: Never Smoker    Smokeless tobacco: Never Used   Substance and Sexual Activity    Alcohol use: No    Drug use: No    Sexual activity: Not on file   Lifestyle    Physical activity     Days per week: Not on file     Minutes per session: Not on file    Stress: Not on file   Relationships    Social connections     Talks on phone: Not on file     Gets together: Not on file     Attends Jainism service: Not on file     Active member of club or organization: Not on file     Attends meetings of clubs or organizations: Not on file     Relationship status: Not on file    Intimate partner violence     Fear of current or ex partner: Not on file     Emotionally abused: Not on file     Physically abused: Not on file MG 1.80 12/31/2020     Phosphorus:  No components found for: PO4  Calcium:  No components found for: CA  CBC:    Lab Results   Component Value Date    WBC 16.0 12/31/2020    RBC 4.05 12/31/2020    HGB 10.9 12/31/2020    HCT 33.9 12/31/2020    MCV 83.8 12/31/2020    RDW 14.5 12/31/2020     12/31/2020     DIFF:    Lab Results   Component Value Date    MCV 83.8 12/31/2020    RDW 14.5 12/31/2020      LDH:  @labcrnt(LDH)@  Uric Acid:  @labcrnt(URIC)@    Radiology Review:  CT ABDOMEN PELVIS WO CONTRAST (12/27/2020): Impression   1. 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 There is at least partial bowel obstruction at the level of the lesion with   proximal colonic and small bowel distension.  The lesion is best visualized   on series 601, image 38.   2. No other acute findings within the abdomen or pelvis. 3. Status post cholecystectomy and hysterectomy.         Problem List  Patient Active Problem List   Diagnosis    Bronchopneumonia    Hypoxia    CKD (chronic kidney disease)    Obesity    Small bowel obstruction (HCC)       IMPRESSION/RECOMMENDATIONS:  1.) Transverse colon cancer  - Has pT3 pN0 colon cancer s/p surgery with neg margins.  - High risk features include presentation with obstruction and <12 LNs. - Would benefit from MSI testing. If MS-H then no chemo. If MS-Stable, then could consider adjuvant Capecitabine (\"chemo pill\"). - When she is improved, she could have a CT w/ contrast as an outpatient. I provided her with my card and recorder her daughter's [facility]AH[facility] phone number, as she will be staying with her daughter after discharge, so that we can make a follow up appointment. Thank you for asking me to see the patient.         Zane Joshi MD  PleaseContact Through Perfect [employer]

## 2020-12-31 NOTE — PROGRESS NOTES
Physical Therapy  Facility/Department: [facility] C3 TELE/MED SURG/ONC  Daily Treatment Note  NAME: Denise Ruiz  : 1940  MRN: [id number]    Date of Service: 2020    Discharge Recommendations:  Home with Home health PT, 24 hour supervision or assist   PT Equipment Recommendations  Equipment Needed: (TBD)  If pt discharges prior to next PT session this note will serve as discharge summary. Assessment   Body structures, Functions, Activity limitations: Decreased functional mobility ; Decreased endurance  Assessment: pt is 67 yo female adm for colonic obstruction, surgery . Pt lives alone, 6+6 steps to enter, previously indep amb and ADLs. Pt states son may stay with her upon discharge, or she will go to sisters home. Today pt agrees to PT dispite feeling nauseated and having abdominal discomfort. She was able to participate in BLE Ex supine and amb bed to chair and back again. Recommend 24 hr assist and home PT at discharged. She will benefit from skilled PT to address above deficits  Treatment Diagnosis: decreased gait and endurance  Specific instructions for Next Treatment: therapeutic ex, functional mob, gait  Patient Education: Reinforced prevention of complications of bedrest, importance of mobility. Pt voices understanding  Barriers to Learning: none  REQUIRES PT FOLLOW UP: Yes  Activity Tolerance  Activity Tolerance: Patient Tolerated treatment well;Patient limited by endurance  Activity Tolerance: pt reports she has been vomiting each time she drinks, abdominal discomfort, low energy today     Patient Diagnosis(es): The primary encounter diagnosis was Small bowel obstruction ([address] Diagnoses of CHELSEA (acute kidney injury) ([address] Abdominal pain, lower, Abnormal CT of the abdomen, Hypokalemia, Hyponatremia, Non-intractable vomiting with nausea, unspecified vomiting type, and Complete intestinal obstruction, unspecified cause ([address] were also pertinent to this visit. has a past medical history of CKD (chronic kidney disease) stage 3, GFR 30-59 ml/min and Hypertension. has a past surgical history that includes hemicolectomy (Right, 12/28/2020). Restrictions  Restrictions/Precautions: Fall Risk, Up as Tolerated, General Precautions  Other position/activity restrictions: clear liquid diet, up as tolerated  Subjective   Chart Reviewed: Yes  Response To Previous Treatment: Patient reporting fatigue but able to participate. Family / Caregiver Present: No  Referring Practitioner: Mariposa  Subjective: Pt agrees to therapy, reports abdominal discomfort and vomiting each time she drinks. She reports fatigue  Comments: pt resting in bed on approach  Pain Screening  Patient Currently in Pain: Yes  Pain Assessment  Pain Assessment: Faces  Mckeon-Baker Pain Rating: Hurts a little bit  Pain Type: Surgical pain;Acute pain  Pain Location: Abdomen  Pain Descriptors: Aching  Pain Onset: On-going  Clinical Progression: Not changed  Functional Pain Assessment: Prevents or interferes some active activities and ADLs  Non-Pharmaceutical Pain Intervention(s): Repositioned; Ambulation/Increased Activity  Response to Pain Intervention: Patient Satisfied  Orientation  Overall Orientation Status: Within Normal Limits     Objective   Bed mobility  Supine to Sit: Minimal assistance  Sit to Supine: Minimal assistance  Transfers  Sit to Stand: Contact guard assistance  Stand to sit: Contact guard assistance  Ambulation  Ambulation?: Yes  Ambulation   Surface: level tile  Device: Rolling Walker  Assistance: Contact guard assistance  Quality of Gait: very slow pace with shortened stride, minimal foot clearance  Distance: 8 steps x 2        Exercises  Quad Sets: 15 x B  Heelslides: 10 x B  Gluteal Sets: 15 x B  Hip Abduction: 10 x B  Ankle Pumps: 15 x B      AM-PAC Score  AM-PAC Inpatient Mobility Raw Score : 17 (12/31/20 1056)  AM-PAC Inpatient T-Scale Score : 42.13 (12/31/20 1056) Mobility Inpatient CMS 0-100% Score: 50.57 (20 105)  Mobility Inpatient CMS G-Code Modifier : CK (20)      Goals  Short term goals  Time Frame for Short term goals: 1 week () unless otherwise specified  Short term goal 1: pt to perform bed mob supervised:  min assist supine to and from sit  Short term goal 2: pt to perform transfers supervised:  CG transfers  Short term goal 3: pt to amb with  ft SBA:  amb with RW 8 steps x 2 CG, very slow  Short term goal 4: pt to negotiate 4 stairs with rails and C/31 not attempted, pt unable to tolerate  Short term goal 5: pt to participate in 10- reps LE ex by :  MET, ONGOING  Patient Goals   Patient goals : \"to be able to walk with walker and feel stable\"    Plan    Plan  Times per week: 3-5 x week  Specific instructions for Next Treatment: therapeutic ex, functional mob, gait  Current Treatment Recommendations: Endurance Training, Gait Training, Functional Mobility Training, Safety Education & Training, Home Exercise Program, Stair training  Safety Devices  Type of devices:  All fall risk precautions in place, Bed alarm in place, Left in bed, Call light within reach, Nurse notified, Patient at risk for falls     Therapy Time   Individual Concurrent Group Co-treatment   Time In 1018         Time Out 1044         Minutes 3401 Derek Martin, PT

## 2020-12-31 NOTE — PROGRESS NOTES
[facility] GENERAL SURGERY    Surgery Progress Note           POD # 3    PATIENT NAME: Nettie Arthur     TODAY'S DATE: 2020    INTERVAL HISTORY:    Pt with emesis overnight. Not taking much liquids this AM. No BMs, min flatus. OBJECTIVE:   VITALS:  BP (!) 174/84   Pulse 89   Temp 97.6 °F (36.4 °C) (Oral)   Resp 20   Ht 5' 4\" (1.626 m)   Wt 210 lb 5.1 oz (95.4 kg)   SpO2 92%   BMI 36.10 kg/m²     INTAKE/OUTPUT:    I/O last 3 completed shifts: In: 1455.8 [P.O.:420; I.V.:1035.8]  Out: 975 [Urine:675; Emesis/NG output:300]  No intake/output data recorded. CONSTITUTIONAL:  awake and alert  LUNGS:  no crackles or wheezing  ABDOMEN:   hypoactive bowel sounds, soft, mildly distended upper abd, tenderness noted appropriate, onQ ball in place   INCISION: clean, dry    Data:  CBC:   Recent Labs     20  1104 20  0547 20  0557   WBC 12.2* 11.6* 16.0*   HGB 11.9* 10.4* 10.9*   HCT 37.5 32.2* 33.9*    283 308     BMP:    Recent Labs     20  1104 20  0547 20  0557   * 139 138   K 3.8 3.5 3.2*    103 98*   CO2 21 24 23   BUN 49* 38* 34*   CREATININE 1.6* 1.4* 1.2   GLUCOSE 113* 104* 150*     Hepatic:   No results for input(s): AST, ALT, ALB, BILITOT, ALKPHOS in the last 72 hours.   Mag:      Recent Labs     20  0547 20  0557   MG 1.60* 1.80      Path:   [address] [place] [address]  [id number]                                        Fax [phone]   [phone]   Department of Pathology   ADDENDUM SURGICAL PATHOLOGY REPORT   Patient Name: Camilo Leigh         Accession No:  [id number]    Age Sex:   1940    80 Y / F      Location:      [place] [address]   Account No:   [de-identified]                 Collected:     2020   Med Rec No:    [id number]                Received:      2020 Attend Phys:   Yoshi Hughes MD    Completed:     12/30/2020   Perform Phys: Jace Casey MD           Technical processing at [facility], [address] [place] [address] [place] [address]  Phone [phone]       ADDENDUM:  The pericolonic adipose tissue was re-examined, and only one   additional benign lymph node was identified for a total of 8 benign lymph   nodes. The remaining tissue thought to be lymph nodes is composed of   adipose tissue. However, two additional tumor deposits (not lymph nodes)   measuring 0.1 and 0.5 cm were also identified. This does not change the   original pTNM staging. Edwige Bull M.D.   (Electronic Signature)   12/31/2020       FINAL DIAGNOSIS:     Left colon, segmental resection:        - Invasive, moderately differentiated colonic adenocarcinoma - tumor        mass is 4.0 cm in maximal dimensions.      - Tumor extends through the entire colonic wall into pericolonic        adipose tissue.      - The proximal, distal and radial resection margins are negative.      - Seven benign pericolonic lymph nodes; see synoptic report and        comment.      - (the pericolonic tissue will be cleared with lymph node clearing        solution and examined for additional lymph nodes, with an addendum        being issued). COMMENT:     SYNOPTIC REPORT - COLON AND RECTUM INCLUDING TRANSANAL DISK EXCISIONS:     Procedure:   Left colon segmental resection     Tumor site:   Left colon     Tumor size:   Greatest dimension: 4.0 cm. Additional dimensions: 3 x 1.3 cm. Macroscopic tumor perforation:   Not identified     Histologic type: Adenocarcinoma     Histologic grade:   G2 moderately differentiated     Tumor extension:   Tumor invades through the muscularis propria into pericolonic tissue     Margins:    All margins are uninvolved by tumor     Treatment effect:   No known presurgical therapy     Lymphovascular invasion:   Not identified Perineural invasion:   Not identified     Tumor budding:   Not identified     Tumor deposits:   Present, single deposit, approximately 1 mm     Regional lymph nodes:   Number of lymph nodes involved: 0   Number of lymph nodes examined: 7, see comment below     Pathologic Stage Classification (pTNM, AJCC 8th edition):     Primary Tumor (pT):   pT3   Tumor invades through the muscularis propria into pericolonic tissue     Regional Lymph Nodes: (pN):   pN0   No regional lymph node metastasis     Distant Metastasis (pM): Not known     Ancillary studies:   Please contact the lab if ancillary studies (MSI) are needed on this   tumor. ASSESSMENT AND PLAN:  68 y.o. female status post Left colectomy with anastomosis and mobilization of splenic  Flexure. GI: NPO and will obtain AXR today  : continue with castano to monitor I/O today   Pain:  Well controlled - remove OnQ pain ball today  Activity: OOB to chair, PT/OT  Path: as above, oncology following. CHELSEA: resolving. Hypokalemia: replace today, if needed, change to IV dosing. Dispo: home with home care - pt going to daughter's house     Electronically signed by ELAINE Thompson - CNP     Surgery Staff    I have examined this patient and read and agree with the note by Yves Hartley CNP from today. Oncology input elicited for eventual adjuvant care.     Synetiq REYES

## 2020-12-31 NOTE — PROGRESS NOTES
Pt. Resting in bed. Alert/oriented. Vitals and assessment stable as charted. Denies any pain/nausea or any other discomfort at present time. Did have nausea with 2 episodes of emesis over the night (around 600ml per night RN). BS hypoactive X4; denies passing flatus yet. Abd inc dry\intact with very small amount old sanguinous drainage noted. Call light in reach. Will continue to monitor. Reviewed incentive spirometry with patient. Education on reason for use. Instructed patient how to use incentive spirometer. Pt demonstrated understanding of use. Encouraged frequent use of I\S while patient is awake.

## 2020-12-31 NOTE — CARE COORDINATION
Chart reviewed day 4. POD 3. Care managed per oncology surgery and IM. Spoke with marc ARTIS. Stated patient with vomiting today. Continues NPO. AXR completed. Plan for home with [facility] to daughters home [employer] following.  Richard Dhaliwal RN

## 2020-12-31 NOTE — PROGRESS NOTES
Comprehensive Nutrition Assessment    Type and Reason for Visit:  Initial, RD Nutrition Re-Screen/LOS    Nutrition Recommendations/Plan:   1. Advance diet as medically feasible - add ONS when diet adv.   2. If unable to advance diet recommend   · Recommend check TG, Mg, Phos, CMP now if not done in last 24 hours. · Bag 1: Clinimix 5/20 starting at 30 mL/hr  · Physician/LIP to monitor closely and correct lytes (Phos,Mg,K+) d/t risk of refeeding syndrome  · Bag 2: As long as electrolytes WNL, advance Clinimix 5/20 to goal rate of 65 mL/hr  · Recommend 250 mL 20% lipids 3 times per week  · Recommend FSBS, monitor glucose, need for insulin  · Pharmacy to adjust MVI and Trace Elements as needed   · When PN to be discontinued, cut rate by 50% and let current bag run out. 3. Monitor nutrition adequacy, pertinent labs, bowel habits, wt changes, and clinical progress    Nutrition Assessment:  Pt admitted with large bowel obtruction and newly diagnosed transverse colon cancer. S/p L colectomy on 12/27. Hx of CKD 3 and HTN. Pt nutritionally compromised AEB NPO/clear liquid diet x4 days. Pt downgraded this date to NPO as pt not tolerating clear liquids (nausea and emesis). May need to consider alternative nutrition if diet unable to advance. Malnutrition Assessment:  Malnutrition Status:   At risk for malnutrition (Comment)      Estimated Daily Nutrient Needs:  Energy (kcal):  8135-0592 kcal; Weight Used for Energy Requirements:  Ideal(55 kg)     Protein (g):  66-83 g; Weight Used for Protein Requirements:  Ideal(1.2-1.5 g/kg)        Fluid (ml/day):   ; Method Used for Fluid Requirements:  1 ml/kcal      Nutrition Related Findings:  Hypoactive BS, NG removed, BG stable, nausea      Wounds:  Surgical Incision       Current Nutrition Therapies:    Diet NPO Effective Now Exceptions are: Ice Chips, Sips with Meds  Current Parenteral Nutrition Orders: · Goal PN Orders Provides: Clinimix 5/20 at goal rate of 65 mL/hr to provide 1373 kcal and 78 g protein. 250 mL bags of 20% lipids 3 times weekly to porivde additional 214 kcal daily. GIR: 2.28      Anthropometric Measures:  · Height: 5' 4\" (162.6 cm)  · Current Body Weight: 210 lb (95.3 kg)    · Ideal Body Weight: 120 lbs; % Ideal Body Weight 175 %   · BMI: 36  · BMI Categories: Obese Class 2 (BMI 35.0 -39.9)       Nutrition Diagnosis:   · Inadequate oral intake related to altered GI function, altered GI structure as evidenced by NPO or clear liquid status due to medical condition(s/p colectomy and newly diagnosed colon cancer)      Nutrition Interventions:   Food and/or Nutrient Delivery:  Continue NPO, Start Parenteral Nutrititon  Nutrition Education/Counseling:  No recommendation at this time   Coordination of Nutrition Care:  Continue to monitor while inpatient    Goals:  Pt will tolerate most appropriate form of nutrition this admission       Nutrition Monitoring and Evaluation:   Food/Nutrient Intake Outcomes:  Diet Advancement/Tolerance  Physical Signs/Symptoms Outcomes:  Biochemical Data, GI Status, Weight     Discharge Planning:     Too soon to determine     Electronically signed by Maribel Dyer, MS, RD, LD on 12/31/20 at 12:33 PM EST    Contact: [phone]

## 2020-12-31 NOTE — PROGRESS NOTES
Hospitalist Progress Note      PCP: Geoff Herbert MD    Date of Admission: 12/27/2020    Chief Complaint: Constipation, abdominal pain, n/v    Hospital Course: Reviewed H&P     Subjective: Patient S/p left colectomy with anastomosis and mobilization of splenic flexure on 12/27/2020 for obstructing transverse colon mass. Had nausea/vomiting episode this morning. D/w general surgery PA Merrai Bailey) plan for obtaining abdominal x-ray later today. D/w patient regarding surgical biopsy results s/o invasive moderately differentiated colonic adenocarcinoma. Reviewed oncology consultation -plan for getting MSI (microsatellite instability) screening. hemodynamically stable this morning. Labs with improved RenaL fx . Currently  Off NG tube and Surgery allowing Clear liquids  . Patient offers no new complaints     Medications:  Reviewed    Infusion Medications    bupivacaine 0.5%       Scheduled Medications    metoprolol succinate  25 mg Oral Daily    sodium chloride flush  10 mL Intravenous 2 times per day    heparin (porcine)  5,000 Units Subcutaneous 3 times per day     PRN Meds: HYDROmorphone, sodium chloride flush, polyethylene glycol, acetaminophen **OR** acetaminophen, prochlorperazine      Intake/Output Summary (Last 24 hours) at 12/31/2020 1459  Last data filed at 12/31/2020 1416  Gross per 24 hour   Intake 1215.8 ml   Output 975 ml   Net 240.8 ml       Physical Exam Performed:  /67   Pulse 82   Temp 99.7 °F (37.6 °C) (Axillary)   Resp 18   Ht 5' 4\" (1.626 m)   Wt 210 lb 5.1 oz (95.4 kg)   SpO2 93%   BMI 36.10 kg/m²     General appearance: No apparent distress, appears stated age and cooperative. HEENT: Pupils equal, round, and reactive to light. Conjunctivae/corneas clear. Neck: Supple, with full range of motion. No jugular venous distention. Trachea midline. Respiratory:  Normal respiratory effort. Clear to auscultation, bilaterally without Rales/Wheezes/Rhonchi. Cardiovascular: Regular rate and rhythm with normal S1/S2 without murmurs, rubs or gallops. Abdomen: hypoactive bowel sounds, soft, non-distended, tenderness noted appropriate, onQ ball in place   Musculoskeletal: No clubbing, cyanosis or edema bilaterally. Full range of motion without deformity. Skin: Skin color, texture, turgor normal.  No rashes or lesions. Neurologic:  Neurovascularly intact without any focal sensory/motor deficits. Cranial nerves: II-XII intact, grossly non-focal.  Psychiatric: Alert and oriented, thought content appropriate, normal insight  Capillary Refill: Brisk,< 3 seconds   Peripheral Pulses: +2 palpable, equal bilaterally      Labs:   Recent Labs     12/29/20  1104 12/30/20  0547 12/31/20  0557   WBC 12.2* 11.6* 16.0*   HGB 11.9* 10.4* 10.9*   HCT 37.5 32.2* 33.9*    283 308     Recent Labs     12/29/20  1104 12/30/20  0547 12/31/20  0557   * 139 138   K 3.8 3.5 3.2*    103 98*   CO2 21 24 23   BUN 49* 38* 34*   CREATININE 1.6* 1.4* 1.2   CALCIUM 9.0 9.1 9.2     Urinalysis:    Lab Results   Component Value Date    NITRU Negative 12/27/2020    BLOODU Negative 12/27/2020    SPECGRAV 1.020 12/27/2020    GLUCOSEU Negative 12/27/2020       Radiology:  XR ACUTE ABD SERIES CHEST 1 VW   Final Result   Minimal left basilar atelectasis. Interval postsurgical changes noted over   the mid abdomen with prominent appearing bowel loops likely indicative of   ileus versus developing partial small bowel obstruction. No   pneumoperitoneum. Recommend comparison with clinical exam and short-term   interval follow-up study. Interval removal of the feeding tube. XR ABDOMEN (KUB) (SINGLE AP VIEW)   Final Result   Gastric tube in the stomach         CT ABDOMEN PELVIS WO CONTRAST Additional Contrast? None   Final Result   1. 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    There is at least partial bowel obstruction at the level of the lesion with proximal colonic and small bowel distension. The lesion is best visualized   on series 601, image 38.   2. No other acute findings within the abdomen or pelvis. 3. Status post cholecystectomy and hysterectomy. Active Hospital Problems    Diagnosis Date Noted    Small bowel obstruction [facility]) [D11.891] 12/27/2020     Assessment/Plan:  Invasive colonic adenocarcinoma  complicated by large bowel obstruction POA  - surgery consulted - S/p left colectomy with anastomosis and mobilization of splenic flexure on 12/27/2020 for obstructing transverse colon mass on 12/28/20 . POD # 3 .   - NG tube d/consuelo on 12/29/20. Noted surgical plans for allowing Clear liquid diet    -Continue pain control.  -Oncology Consulted - Added MSI (micro satellite instability) screening and recommended outpatient follow-up     CHELSEA due to dehydration from above, in the setting of CKD3 - Improved    -Admission creatinine 2.6;  Improved with  IVFs. Creatinine down to 1.2 today  Baseline Cr is perhaps 1.4.     Hypovolemic hyponatremia  POA - Na 128 on admission - Resolved   -Improved with IVFs. Baseline Na is normal.     HTN  - held HCTZ while on IVFs. - continue bisoprolol (metoprolol here) and amlodipine     HLD  - statin    DVT Prophylaxis: Lovenox   Diet: Diet NPO Effective Now Exceptions are: Ice Chips, Sips with Meds  Code Status: Full Code    PT/OT Eval Status: Valuated on 12/29/2020 with recommendations to home with home care    Dispo -likely 3 to 4 days pending postop course, p.o. tolerance and clearance by general surgery. The note was completed using Dragon -speech recognition software & EMR  . Every effort was made to ensure accuracy; however, inadvertent computerized transcription errors may be present.     Rey Alonzo MD

## 2020-12-31 NOTE — PROGRESS NOTES
Pt alert and oriented, VSS. Shift assessment completed and documented. Denies any needs. Will continue to monitor.

## 2021-01-01 ENCOUNTER — APPOINTMENT (OUTPATIENT)
Dept: GENERAL RADIOLOGY | Age: 81
DRG: 329 | End: 2021-01-01
Payer: MEDICARE

## 2021-01-01 ENCOUNTER — APPOINTMENT (OUTPATIENT)
Dept: INTERVENTIONAL RADIOLOGY/VASCULAR | Age: 81
DRG: 329 | End: 2021-01-01
Payer: MEDICARE

## 2021-01-01 ENCOUNTER — APPOINTMENT (OUTPATIENT)
Dept: CT IMAGING | Age: 81
DRG: 329 | End: 2021-01-01
Payer: MEDICARE

## 2021-01-01 ENCOUNTER — ANESTHESIA EVENT (OUTPATIENT)
Dept: OPERATING ROOM | Age: 81
DRG: 329 | End: 2021-01-01
Payer: MEDICARE

## 2021-01-01 ENCOUNTER — ANESTHESIA (OUTPATIENT)
Dept: OPERATING ROOM | Age: 81
DRG: 329 | End: 2021-01-01
Payer: MEDICARE

## 2021-01-01 VITALS
OXYGEN SATURATION: 100 % | TEMPERATURE: 96 F | DIASTOLIC BLOOD PRESSURE: 59 MMHG | SYSTOLIC BLOOD PRESSURE: 115 MMHG | RESPIRATION RATE: 14 BRPM

## 2021-01-01 VITALS
WEIGHT: 231.26 LBS | OXYGEN SATURATION: 98 % | DIASTOLIC BLOOD PRESSURE: 33 MMHG | TEMPERATURE: 99.9 F | HEIGHT: 64 IN | SYSTOLIC BLOOD PRESSURE: 59 MMHG | RESPIRATION RATE: 19 BRPM | HEART RATE: 70 BPM | BODY MASS INDEX: 39.48 KG/M2

## 2021-01-01 PROBLEM — J98.11 ATELECTASIS: Status: ACTIVE | Noted: 2021-01-01

## 2021-01-01 PROBLEM — R65.10 SIRS (SYSTEMIC INFLAMMATORY RESPONSE SYNDROME) (HCC): Status: ACTIVE | Noted: 2021-01-01

## 2021-01-01 PROBLEM — R06.03 ACUTE RESPIRATORY DISTRESS: Status: ACTIVE | Noted: 2021-01-01

## 2021-01-01 PROBLEM — N17.9 ACUTE-ON-CHRONIC KIDNEY INJURY (HCC): Status: ACTIVE | Noted: 2021-01-01

## 2021-01-01 PROBLEM — E87.20 LACTIC ACIDOSIS: Status: ACTIVE | Noted: 2021-01-01

## 2021-01-01 PROBLEM — D72.825 BANDEMIA: Status: ACTIVE | Noted: 2021-01-01

## 2021-01-01 PROBLEM — N18.9 ACUTE-ON-CHRONIC KIDNEY INJURY (HCC): Status: ACTIVE | Noted: 2021-01-01

## 2021-01-01 PROBLEM — K66.8 PNEUMOPERITONEUM: Status: ACTIVE | Noted: 2021-01-01

## 2021-01-01 LAB
A/G RATIO: 0.8 (ref 1.1–2.2)
ALBUMIN SERPL-MCNC: 1.6 G/DL (ref 3.4–5)
ALBUMIN SERPL-MCNC: 1.7 G/DL (ref 3.4–5)
ALBUMIN SERPL-MCNC: 1.9 G/DL (ref 3.4–5)
ALBUMIN SERPL-MCNC: 2.1 G/DL (ref 3.4–5)
ALBUMIN SERPL-MCNC: 2.3 G/DL (ref 3.4–5)
ALBUMIN SERPL-MCNC: 2.4 G/DL (ref 3.4–5)
ALBUMIN SERPL-MCNC: 2.5 G/DL (ref 3.4–5)
ALBUMIN SERPL-MCNC: 2.5 G/DL (ref 3.4–5)
ALP BLD-CCNC: 74 U/L (ref 40–129)
ALP BLD-CCNC: 76 U/L (ref 40–129)
ALP BLD-CCNC: 82 U/L (ref 40–129)
ALT SERPL-CCNC: 11 U/L (ref 10–40)
ALT SERPL-CCNC: 6 U/L (ref 10–40)
ALT SERPL-CCNC: 9 U/L (ref 10–40)
ANION GAP SERPL CALCULATED.3IONS-SCNC: 10 MMOL/L (ref 3–16)
ANION GAP SERPL CALCULATED.3IONS-SCNC: 11 MMOL/L (ref 3–16)
ANION GAP SERPL CALCULATED.3IONS-SCNC: 12 MMOL/L (ref 3–16)
ANION GAP SERPL CALCULATED.3IONS-SCNC: 13 MMOL/L (ref 3–16)
ANION GAP SERPL CALCULATED.3IONS-SCNC: 13 MMOL/L (ref 3–16)
ANION GAP SERPL CALCULATED.3IONS-SCNC: 15 MMOL/L (ref 3–16)
ANION GAP SERPL CALCULATED.3IONS-SCNC: 20 MMOL/L (ref 3–16)
ANION GAP SERPL CALCULATED.3IONS-SCNC: 8 MMOL/L (ref 3–16)
ANION GAP SERPL CALCULATED.3IONS-SCNC: 8 MMOL/L (ref 3–16)
AST SERPL-CCNC: 15 U/L (ref 15–37)
AST SERPL-CCNC: 18 U/L (ref 15–37)
AST SERPL-CCNC: 29 U/L (ref 15–37)
BANDED NEUTROPHILS RELATIVE PERCENT: 10 % (ref 0–7)
BASE EXCESS ARTERIAL: 19 (ref -3–3)
BASE EXCESS ARTERIAL: 23 (ref -3–3)
BASE EXCESS ARTERIAL: 4 (ref -3–3)
BASE EXCESS ARTERIAL: 5 (ref -3–3)
BASE EXCESS ARTERIAL: 8.3 MMOL/L (ref -3–3)
BASOPHILS ABSOLUTE: 0 K/UL (ref 0–0.2)
BASOPHILS ABSOLUTE: 0.1 K/UL (ref 0–0.2)
BASOPHILS RELATIVE PERCENT: 0 %
BASOPHILS RELATIVE PERCENT: 0.1 %
BASOPHILS RELATIVE PERCENT: 0.2 %
BASOPHILS RELATIVE PERCENT: 0.2 %
BASOPHILS RELATIVE PERCENT: 0.4 %
BILIRUB SERPL-MCNC: 0.3 MG/DL (ref 0–1)
BILIRUB SERPL-MCNC: 0.4 MG/DL (ref 0–1)
BILIRUB SERPL-MCNC: 0.4 MG/DL (ref 0–1)
BILIRUBIN DIRECT: <0.2 MG/DL (ref 0–0.3)
BILIRUBIN DIRECT: <0.2 MG/DL (ref 0–0.3)
BILIRUBIN, INDIRECT: ABNORMAL MG/DL (ref 0–1)
BILIRUBIN, INDIRECT: ABNORMAL MG/DL (ref 0–1)
BLOOD CULTURE, ROUTINE: NORMAL
BUN BLDV-MCNC: 21 MG/DL (ref 7–20)
BUN BLDV-MCNC: 24 MG/DL (ref 7–20)
BUN BLDV-MCNC: 25 MG/DL (ref 7–20)
BUN BLDV-MCNC: 32 MG/DL (ref 7–20)
BUN BLDV-MCNC: 33 MG/DL (ref 7–20)
BUN BLDV-MCNC: 41 MG/DL (ref 7–20)
BUN BLDV-MCNC: 42 MG/DL (ref 7–20)
BUN BLDV-MCNC: 45 MG/DL (ref 7–20)
BUN BLDV-MCNC: 49 MG/DL (ref 7–20)
BUN BLDV-MCNC: 57 MG/DL (ref 7–20)
BUN BLDV-MCNC: 59 MG/DL (ref 7–20)
BUN BLDV-MCNC: 79 MG/DL (ref 7–20)
CALCIUM IONIZED: 0.98 MMOL/L (ref 1.12–1.32)
CALCIUM IONIZED: 0.99 MMOL/L (ref 1.12–1.32)
CALCIUM IONIZED: 1.02 MMOL/L (ref 1.12–1.32)
CALCIUM IONIZED: 1.04 MMOL/L (ref 1.12–1.32)
CALCIUM IONIZED: 1.05 MMOL/L (ref 1.12–1.32)
CALCIUM IONIZED: 1.06 MMOL/L (ref 1.12–1.32)
CALCIUM IONIZED: 1.08 MMOL/L (ref 1.12–1.32)
CALCIUM SERPL-MCNC: 7.2 MG/DL (ref 8.3–10.6)
CALCIUM SERPL-MCNC: 7.6 MG/DL (ref 8.3–10.6)
CALCIUM SERPL-MCNC: 7.8 MG/DL (ref 8.3–10.6)
CALCIUM SERPL-MCNC: 7.9 MG/DL (ref 8.3–10.6)
CALCIUM SERPL-MCNC: 8 MG/DL (ref 8.3–10.6)
CALCIUM SERPL-MCNC: 8 MG/DL (ref 8.3–10.6)
CALCIUM SERPL-MCNC: 8.5 MG/DL (ref 8.3–10.6)
CALCIUM SERPL-MCNC: 8.7 MG/DL (ref 8.3–10.6)
CARBOXYHEMOGLOBIN ARTERIAL: 0.3 % (ref 0–1.5)
CHLORIDE BLD-SCNC: 101 MMOL/L (ref 99–110)
CHLORIDE BLD-SCNC: 102 MMOL/L (ref 99–110)
CHLORIDE BLD-SCNC: 92 MMOL/L (ref 99–110)
CHLORIDE BLD-SCNC: 96 MMOL/L (ref 99–110)
CHLORIDE BLD-SCNC: 96 MMOL/L (ref 99–110)
CHLORIDE BLD-SCNC: 97 MMOL/L (ref 99–110)
CHLORIDE BLD-SCNC: 98 MMOL/L (ref 99–110)
CHLORIDE BLD-SCNC: 98 MMOL/L (ref 99–110)
CHLORIDE BLD-SCNC: 99 MMOL/L (ref 99–110)
CO2: 15 MMOL/L (ref 21–32)
CO2: 24 MMOL/L (ref 21–32)
CO2: 25 MMOL/L (ref 21–32)
CO2: 26 MMOL/L (ref 21–32)
CO2: 26 MMOL/L (ref 21–32)
CO2: 28 MMOL/L (ref 21–32)
CO2: 30 MMOL/L (ref 21–32)
CO2: 31 MMOL/L (ref 21–32)
CO2: 36 MMOL/L (ref 21–32)
CO2: 39 MMOL/L (ref 21–32)
CREAT SERPL-MCNC: 0.8 MG/DL (ref 0.6–1.2)
CREAT SERPL-MCNC: 0.9 MG/DL (ref 0.6–1.2)
CREAT SERPL-MCNC: 0.9 MG/DL (ref 0.6–1.2)
CREAT SERPL-MCNC: 1 MG/DL (ref 0.6–1.2)
CREAT SERPL-MCNC: 1.3 MG/DL (ref 0.6–1.2)
CREAT SERPL-MCNC: 1.5 MG/DL (ref 0.6–1.2)
CREAT SERPL-MCNC: 1.6 MG/DL (ref 0.6–1.2)
CREAT SERPL-MCNC: 1.6 MG/DL (ref 0.6–1.2)
CREAT SERPL-MCNC: 2.1 MG/DL (ref 0.6–1.2)
CREAT SERPL-MCNC: 2.3 MG/DL (ref 0.6–1.2)
CREAT SERPL-MCNC: 2.8 MG/DL (ref 0.6–1.2)
CREAT SERPL-MCNC: 3.1 MG/DL (ref 0.6–1.2)
CREATININE URINE: 177.9 MG/DL (ref 28–259)
CULTURE, BLOOD 2: NORMAL
EKG ATRIAL RATE: 102 BPM
EKG DIAGNOSIS: NORMAL
EKG P AXIS: 51 DEGREES
EKG P-R INTERVAL: 222 MS
EKG Q-T INTERVAL: 360 MS
EKG QRS DURATION: 86 MS
EKG QTC CALCULATION (BAZETT): 469 MS
EKG R AXIS: -4 DEGREES
EKG T AXIS: 68 DEGREES
EKG VENTRICULAR RATE: 102 BPM
EOSINOPHILS ABSOLUTE: 0 K/UL (ref 0–0.6)
EOSINOPHILS ABSOLUTE: 0.1 K/UL (ref 0–0.6)
EOSINOPHILS ABSOLUTE: 0.1 K/UL (ref 0–0.6)
EOSINOPHILS ABSOLUTE: 0.2 K/UL (ref 0–0.6)
EOSINOPHILS ABSOLUTE: 0.6 K/UL (ref 0–0.6)
EOSINOPHILS RELATIVE PERCENT: 0 %
EOSINOPHILS RELATIVE PERCENT: 0.3 %
EOSINOPHILS RELATIVE PERCENT: 0.4 %
EOSINOPHILS RELATIVE PERCENT: 0.9 %
EOSINOPHILS RELATIVE PERCENT: 3.6 %
GFR AFRICAN AMERICAN: 18
GFR AFRICAN AMERICAN: 20
GFR AFRICAN AMERICAN: 25
GFR AFRICAN AMERICAN: 28
GFR AFRICAN AMERICAN: 38
GFR AFRICAN AMERICAN: 38
GFR AFRICAN AMERICAN: 41
GFR AFRICAN AMERICAN: 48
GFR AFRICAN AMERICAN: >60
GFR NON-AFRICAN AMERICAN: 15
GFR NON-AFRICAN AMERICAN: 16
GFR NON-AFRICAN AMERICAN: 20
GFR NON-AFRICAN AMERICAN: 23
GFR NON-AFRICAN AMERICAN: 31
GFR NON-AFRICAN AMERICAN: 31
GFR NON-AFRICAN AMERICAN: 34
GFR NON-AFRICAN AMERICAN: 40
GFR NON-AFRICAN AMERICAN: 54
GFR NON-AFRICAN AMERICAN: >60
GLOBULIN: 2.7 G/DL
GLUCOSE BLD-MCNC: 154 MG/DL (ref 70–99)
GLUCOSE BLD-MCNC: 157 MG/DL (ref 70–99)
GLUCOSE BLD-MCNC: 160 MG/DL (ref 70–99)
GLUCOSE BLD-MCNC: 177 MG/DL (ref 70–99)
GLUCOSE BLD-MCNC: 180 MG/DL (ref 70–99)
GLUCOSE BLD-MCNC: 211 MG/DL (ref 70–99)
GLUCOSE BLD-MCNC: 215 MG/DL (ref 70–99)
GLUCOSE BLD-MCNC: 219 MG/DL (ref 70–99)
GLUCOSE BLD-MCNC: 219 MG/DL (ref 70–99)
GLUCOSE BLD-MCNC: 265 MG/DL (ref 70–99)
GLUCOSE BLD-MCNC: 269 MG/DL (ref 70–99)
GLUCOSE BLD-MCNC: 270 MG/DL (ref 70–99)
GLUCOSE BLD-MCNC: 275 MG/DL (ref 70–99)
GLUCOSE BLD-MCNC: 278 MG/DL (ref 70–99)
GLUCOSE BLD-MCNC: 283 MG/DL (ref 70–99)
GLUCOSE BLD-MCNC: 298 MG/DL (ref 70–99)
GLUCOSE BLD-MCNC: 307 MG/DL (ref 70–99)
GLUCOSE BLD-MCNC: 325 MG/DL (ref 70–99)
GLUCOSE BLD-MCNC: 329 MG/DL (ref 70–99)
GLUCOSE BLD-MCNC: 334 MG/DL (ref 70–99)
GLUCOSE BLD-MCNC: 352 MG/DL (ref 70–99)
GLUCOSE BLD-MCNC: 374 MG/DL (ref 70–99)
GLUCOSE BLD-MCNC: 412 MG/DL (ref 70–99)
GLUCOSE BLD-MCNC: 417 MG/DL (ref 70–99)
HCO3 ARTERIAL: 27.3 MMOL/L (ref 21–29)
HCO3 ARTERIAL: 27.9 MMOL/L (ref 21–29)
HCO3 ARTERIAL: 30.4 MMOL/L (ref 21–29)
HCO3 ARTERIAL: 43.7 MMOL/L (ref 21–29)
HCO3 ARTERIAL: 44.5 MMOL/L (ref 21–29)
HCT VFR BLD CALC: 21.2 % (ref 36–48)
HCT VFR BLD CALC: 29.6 % (ref 36–48)
HCT VFR BLD CALC: 30.8 % (ref 36–48)
HCT VFR BLD CALC: 31.4 % (ref 36–48)
HCT VFR BLD CALC: 36.5 % (ref 36–48)
HEMOGLOBIN, ART, EXTENDED: 10.3 G/DL (ref 12–16)
HEMOGLOBIN: 10.1 G/DL (ref 12–16)
HEMOGLOBIN: 10.2 G/DL (ref 12–16)
HEMOGLOBIN: 10.3 GM/DL (ref 12–16)
HEMOGLOBIN: 10.6 GM/DL (ref 12–16)
HEMOGLOBIN: 11.7 G/DL (ref 12–16)
HEMOGLOBIN: 7.1 G/DL (ref 12–16)
HEMOGLOBIN: 9.6 G/DL (ref 12–16)
LACTATE: 1.52 MMOL/L (ref 0.4–2)
LACTATE: 5.94 MMOL/L (ref 0.4–2)
LACTATE: 6.12 MMOL/L (ref 0.4–2)
LACTIC ACID: 1.3 MMOL/L (ref 0.4–2)
LACTIC ACID: 2.5 MMOL/L (ref 0.4–2)
LACTIC ACID: 4.1 MMOL/L (ref 0.4–2)
LACTIC ACID: 5.1 MMOL/L (ref 0.4–2)
LACTIC ACID: 5.7 MMOL/L (ref 0.4–2)
LIPASE: 27 U/L (ref 13–60)
LIPASE: 44 U/L (ref 13–60)
LV EF: 35 %
LVEF MODALITY: NORMAL
LYMPHOCYTES ABSOLUTE: 0.5 K/UL (ref 1–5.1)
LYMPHOCYTES ABSOLUTE: 0.8 K/UL (ref 1–5.1)
LYMPHOCYTES ABSOLUTE: 0.9 K/UL (ref 1–5.1)
LYMPHOCYTES ABSOLUTE: 1.3 K/UL (ref 1–5.1)
LYMPHOCYTES ABSOLUTE: 1.3 K/UL (ref 1–5.1)
LYMPHOCYTES RELATIVE PERCENT: 2 %
LYMPHOCYTES RELATIVE PERCENT: 4.4 %
LYMPHOCYTES RELATIVE PERCENT: 4.4 %
LYMPHOCYTES RELATIVE PERCENT: 7.6 %
LYMPHOCYTES RELATIVE PERCENT: 8.6 %
MAGNESIUM: 1.4 MG/DL (ref 1.8–2.4)
MAGNESIUM: 1.5 MG/DL (ref 1.8–2.4)
MAGNESIUM: 1.7 MG/DL (ref 1.8–2.4)
MAGNESIUM: 1.8 MG/DL (ref 1.8–2.4)
MAGNESIUM: 1.9 MG/DL (ref 1.8–2.4)
MAGNESIUM: 2 MG/DL (ref 1.8–2.4)
MAGNESIUM: 2.1 MG/DL (ref 1.8–2.4)
MAGNESIUM: 2.4 MG/DL (ref 1.8–2.4)
MAGNESIUM: 2.4 MG/DL (ref 1.8–2.4)
MAGNESIUM: 2.8 MG/DL (ref 1.8–2.4)
MCH RBC QN AUTO: 26.7 PG (ref 26–34)
MCH RBC QN AUTO: 27.3 PG (ref 26–34)
MCH RBC QN AUTO: 27.4 PG (ref 26–34)
MCH RBC QN AUTO: 27.6 PG (ref 26–34)
MCH RBC QN AUTO: 28.4 PG (ref 26–34)
MCHC RBC AUTO-ENTMCNC: 32 G/DL (ref 31–36)
MCHC RBC AUTO-ENTMCNC: 32.6 G/DL (ref 31–36)
MCHC RBC AUTO-ENTMCNC: 32.6 G/DL (ref 31–36)
MCHC RBC AUTO-ENTMCNC: 32.7 G/DL (ref 31–36)
MCHC RBC AUTO-ENTMCNC: 33.6 G/DL (ref 31–36)
MCV RBC AUTO: 83.5 FL (ref 80–100)
MCV RBC AUTO: 83.7 FL (ref 80–100)
MCV RBC AUTO: 83.7 FL (ref 80–100)
MCV RBC AUTO: 84.6 FL (ref 80–100)
MCV RBC AUTO: 84.9 FL (ref 80–100)
METHEMOGLOBIN ARTERIAL: 0.3 %
MONOCYTES ABSOLUTE: 0.4 K/UL (ref 0–1.3)
MONOCYTES ABSOLUTE: 0.4 K/UL (ref 0–1.3)
MONOCYTES ABSOLUTE: 0.5 K/UL (ref 0–1.3)
MONOCYTES RELATIVE PERCENT: 1.9 %
MONOCYTES RELATIVE PERCENT: 2 %
MONOCYTES RELATIVE PERCENT: 2.7 %
MONOCYTES RELATIVE PERCENT: 2.7 %
MONOCYTES RELATIVE PERCENT: 2.8 %
NEUTROPHILS ABSOLUTE: 13.1 K/UL (ref 1.7–7.7)
NEUTROPHILS ABSOLUTE: 15.3 K/UL (ref 1.7–7.7)
NEUTROPHILS ABSOLUTE: 17.3 K/UL (ref 1.7–7.7)
NEUTROPHILS ABSOLUTE: 18.2 K/UL (ref 1.7–7.7)
NEUTROPHILS ABSOLUTE: 25.8 K/UL (ref 1.7–7.7)
NEUTROPHILS RELATIVE PERCENT: 84.9 %
NEUTROPHILS RELATIVE PERCENT: 86 %
NEUTROPHILS RELATIVE PERCENT: 88.4 %
NEUTROPHILS RELATIVE PERCENT: 92.4 %
NEUTROPHILS RELATIVE PERCENT: 93.1 %
O2 CONTENT ARTERIAL: 15 ML/DL
O2 SAT, ARTERIAL: 100 % (ref 93–100)
O2 SAT, ARTERIAL: 98.9 %
O2 SAT, ARTERIAL: 99 % (ref 93–100)
O2 THERAPY: ABNORMAL
PCO2 ARTERIAL: 32.9 MMHG (ref 35–45)
PCO2 ARTERIAL: 33.1 MM HG (ref 35–45)
PCO2 ARTERIAL: 42.2 MM HG (ref 35–45)
PCO2 ARTERIAL: 45.8 MM HG (ref 35–45)
PCO2 ARTERIAL: 68.2 MM HG (ref 35–45)
PDW BLD-RTO: 14.8 % (ref 12.4–15.4)
PDW BLD-RTO: 14.9 % (ref 12.4–15.4)
PDW BLD-RTO: 15.3 % (ref 12.4–15.4)
PERFORMED ON: ABNORMAL
PH ARTERIAL: 7.42 (ref 7.35–7.45)
PH ARTERIAL: 7.43 (ref 7.35–7.45)
PH ARTERIAL: 7.52 (ref 7.35–7.45)
PH ARTERIAL: 7.58 (ref 7.35–7.45)
PH ARTERIAL: 7.59 (ref 7.35–7.45)
PH VENOUS: 7.46 (ref 7.35–7.45)
PH VENOUS: 7.47 (ref 7.35–7.45)
PH VENOUS: 7.55 (ref 7.35–7.45)
PH VENOUS: 7.55 (ref 7.35–7.45)
PH VENOUS: 7.59 (ref 7.35–7.45)
PHOSPHORUS: 1.9 MG/DL (ref 2.5–4.9)
PHOSPHORUS: 1.9 MG/DL (ref 2.5–4.9)
PHOSPHORUS: 2 MG/DL (ref 2.5–4.9)
PHOSPHORUS: 2.2 MG/DL (ref 2.5–4.9)
PHOSPHORUS: 2.4 MG/DL (ref 2.5–4.9)
PHOSPHORUS: 2.6 MG/DL (ref 2.5–4.9)
PHOSPHORUS: 2.9 MG/DL (ref 2.5–4.9)
PHOSPHORUS: 3.1 MG/DL (ref 2.5–4.9)
PHOSPHORUS: 3.2 MG/DL (ref 2.5–4.9)
PHOSPHORUS: 3.5 MG/DL (ref 2.5–4.9)
PLATELET # BLD: 128 K/UL (ref 135–450)
PLATELET # BLD: 206 K/UL (ref 135–450)
PLATELET # BLD: 263 K/UL (ref 135–450)
PLATELET # BLD: 305 K/UL (ref 135–450)
PLATELET # BLD: 384 K/UL (ref 135–450)
PLATELET SLIDE REVIEW: ADEQUATE
PMV BLD AUTO: 7.9 FL (ref 5–10.5)
PMV BLD AUTO: 8.2 FL (ref 5–10.5)
PMV BLD AUTO: 8.5 FL (ref 5–10.5)
PMV BLD AUTO: 8.5 FL (ref 5–10.5)
PMV BLD AUTO: 8.9 FL (ref 5–10.5)
PO2 ARTERIAL: 117 MM HG (ref 75–108)
PO2 ARTERIAL: 228.9 MMHG (ref 75–108)
PO2 ARTERIAL: 306.8 MM HG (ref 75–108)
PO2 ARTERIAL: 473.2 MM HG (ref 75–108)
PO2 ARTERIAL: 595.3 MM HG (ref 75–108)
POC HEMATOCRIT: 30 % (ref 36–48)
POC HEMATOCRIT: 31 % (ref 36–48)
POC POTASSIUM: 3.5 MMOL/L (ref 3.5–5.1)
POC POTASSIUM: 4.5 MMOL/L (ref 3.5–5.1)
POC SAMPLE TYPE: ABNORMAL
POC SODIUM: 150 MMOL/L (ref 136–145)
POC SODIUM: 151 MMOL/L (ref 136–145)
POTASSIUM REFLEX MAGNESIUM: 3.2 MMOL/L (ref 3.5–5.1)
POTASSIUM REFLEX MAGNESIUM: 3.3 MMOL/L (ref 3.5–5.1)
POTASSIUM REFLEX MAGNESIUM: 3.7 MMOL/L (ref 3.5–5.1)
POTASSIUM SERPL-SCNC: 3.2 MMOL/L (ref 3.5–5.1)
POTASSIUM SERPL-SCNC: 3.5 MMOL/L (ref 3.5–5.1)
POTASSIUM SERPL-SCNC: 3.7 MMOL/L (ref 3.5–5.1)
POTASSIUM SERPL-SCNC: 3.9 MMOL/L (ref 3.5–5.1)
POTASSIUM SERPL-SCNC: 4.1 MMOL/L (ref 3.5–5.1)
POTASSIUM SERPL-SCNC: 4.2 MMOL/L (ref 3.5–5.1)
POTASSIUM SERPL-SCNC: 4.4 MMOL/L (ref 3.5–5.1)
POTASSIUM SERPL-SCNC: 5.1 MMOL/L (ref 3.5–5.1)
PRO-BNP: 1344 PG/ML (ref 0–449)
RBC # BLD: 2.51 M/UL (ref 4–5.2)
RBC # BLD: 3.49 M/UL (ref 4–5.2)
RBC # BLD: 3.68 M/UL (ref 4–5.2)
RBC # BLD: 3.75 M/UL (ref 4–5.2)
RBC # BLD: 4.38 M/UL (ref 4–5.2)
SLIDE REVIEW: ABNORMAL
SODIUM BLD-SCNC: 130 MMOL/L (ref 136–145)
SODIUM BLD-SCNC: 133 MMOL/L (ref 136–145)
SODIUM BLD-SCNC: 134 MMOL/L (ref 136–145)
SODIUM BLD-SCNC: 135 MMOL/L (ref 136–145)
SODIUM BLD-SCNC: 135 MMOL/L (ref 136–145)
SODIUM BLD-SCNC: 136 MMOL/L (ref 136–145)
SODIUM BLD-SCNC: 136 MMOL/L (ref 136–145)
SODIUM BLD-SCNC: 137 MMOL/L (ref 136–145)
SODIUM BLD-SCNC: 140 MMOL/L (ref 136–145)
SODIUM BLD-SCNC: 141 MMOL/L (ref 136–145)
SODIUM BLD-SCNC: 143 MMOL/L (ref 136–145)
SODIUM BLD-SCNC: 146 MMOL/L (ref 136–145)
SODIUM URINE: <20 MMOL/L
TCO2 ARTERIAL: 28 MMOL/L
TCO2 ARTERIAL: 29 MMOL/L
TCO2 ARTERIAL: 31.4 MMOL/L
TCO2 ARTERIAL: 46 MMOL/L
TCO2 ARTERIAL: 46 MMOL/L
TOTAL CK: 41 U/L (ref 26–192)
TOTAL PROTEIN: 4.8 G/DL (ref 6.4–8.2)
TOTAL PROTEIN: 5.4 G/DL (ref 6.4–8.2)
TOTAL PROTEIN: 5.9 G/DL (ref 6.4–8.2)
TRIGL SERPL-MCNC: 252 MG/DL (ref 0–150)
TRIGL SERPL-MCNC: 427 MG/DL (ref 0–150)
URIC ACID, SERUM: 11 MG/DL (ref 2.6–6)
WBC # BLD: 15.4 K/UL (ref 4–11)
WBC # BLD: 17.3 K/UL (ref 4–11)
WBC # BLD: 18.6 K/UL (ref 4–11)
WBC # BLD: 19.7 K/UL (ref 4–11)
WBC # BLD: 26.9 K/UL (ref 4–11)

## 2021-01-01 PROCEDURE — 85025 COMPLETE CBC W/AUTO DIFF WBC: CPT

## 2021-01-01 PROCEDURE — 99024 POSTOP FOLLOW-UP VISIT: CPT | Performed by: SURGERY

## 2021-01-01 PROCEDURE — 6360000002 HC RX W HCPCS: Performed by: SURGERY

## 2021-01-01 PROCEDURE — 94761 N-INVAS EAR/PLS OXIMETRY MLT: CPT

## 2021-01-01 PROCEDURE — 2500000003 HC RX 250 WO HCPCS: Performed by: INTERNAL MEDICINE

## 2021-01-01 PROCEDURE — 6360000002 HC RX W HCPCS: Performed by: INTERNAL MEDICINE

## 2021-01-01 PROCEDURE — 2700000000 HC OXYGEN THERAPY PER DAY

## 2021-01-01 PROCEDURE — 71045 X-RAY EXAM CHEST 1 VIEW: CPT

## 2021-01-01 PROCEDURE — 74176 CT ABD & PELVIS W/O CONTRAST: CPT

## 2021-01-01 PROCEDURE — 83735 ASSAY OF MAGNESIUM: CPT

## 2021-01-01 PROCEDURE — 99291 CRITICAL CARE FIRST HOUR: CPT | Performed by: INTERNAL MEDICINE

## 2021-01-01 PROCEDURE — 84132 ASSAY OF SERUM POTASSIUM: CPT

## 2021-01-01 PROCEDURE — 82550 ASSAY OF CK (CPK): CPT

## 2021-01-01 PROCEDURE — 2580000003 HC RX 258: Performed by: INTERNAL MEDICINE

## 2021-01-01 PROCEDURE — 3700000000 HC ANESTHESIA ATTENDED CARE: Performed by: SURGERY

## 2021-01-01 PROCEDURE — P9047 ALBUMIN (HUMAN), 25%, 50ML: HCPCS | Performed by: INTERNAL MEDICINE

## 2021-01-01 PROCEDURE — 2500000003 HC RX 250 WO HCPCS: Performed by: SURGERY

## 2021-01-01 PROCEDURE — 2000000000 HC ICU R&B

## 2021-01-01 PROCEDURE — 3600000004 HC SURGERY LEVEL 4 BASE: Performed by: SURGERY

## 2021-01-01 PROCEDURE — 82330 ASSAY OF CALCIUM: CPT

## 2021-01-01 PROCEDURE — 2580000003 HC RX 258: Performed by: SURGERY

## 2021-01-01 PROCEDURE — 36573 INSJ PICC RS&I 5 YR+: CPT

## 2021-01-01 PROCEDURE — 82947 ASSAY GLUCOSE BLOOD QUANT: CPT

## 2021-01-01 PROCEDURE — 83605 ASSAY OF LACTIC ACID: CPT

## 2021-01-01 PROCEDURE — 94003 VENT MGMT INPAT SUBQ DAY: CPT

## 2021-01-01 PROCEDURE — 90945 DIALYSIS ONE EVALUATION: CPT

## 2021-01-01 PROCEDURE — 87040 BLOOD CULTURE FOR BACTERIA: CPT

## 2021-01-01 PROCEDURE — 99292 CRITICAL CARE ADDL 30 MIN: CPT | Performed by: INTERNAL MEDICINE

## 2021-01-01 PROCEDURE — C9113 INJ PANTOPRAZOLE SODIUM, VIA: HCPCS | Performed by: INTERNAL MEDICINE

## 2021-01-01 PROCEDURE — 6360000004 HC RX CONTRAST MEDICATION: Performed by: NURSE PRACTITIONER

## 2021-01-01 PROCEDURE — 84295 ASSAY OF SERUM SODIUM: CPT

## 2021-01-01 PROCEDURE — 97535 SELF CARE MNGMENT TRAINING: CPT

## 2021-01-01 PROCEDURE — 6360000002 HC RX W HCPCS: Performed by: NURSE PRACTITIONER

## 2021-01-01 PROCEDURE — 82803 BLOOD GASES ANY COMBINATION: CPT

## 2021-01-01 PROCEDURE — 2720000010 HC SURG SUPPLY STERILE: Performed by: SURGERY

## 2021-01-01 PROCEDURE — 6370000000 HC RX 637 (ALT 250 FOR IP): Performed by: INTERNAL MEDICINE

## 2021-01-01 PROCEDURE — 93005 ELECTROCARDIOGRAM TRACING: CPT | Performed by: INTERNAL MEDICINE

## 2021-01-01 PROCEDURE — 36415 COLL VENOUS BLD VENIPUNCTURE: CPT

## 2021-01-01 PROCEDURE — 80069 RENAL FUNCTION PANEL: CPT

## 2021-01-01 PROCEDURE — 5A1945Z RESPIRATORY VENTILATION, 24-96 CONSECUTIVE HOURS: ICD-10-PCS | Performed by: SURGERY

## 2021-01-01 PROCEDURE — 3600000014 HC SURGERY LEVEL 4 ADDTL 15MIN: Performed by: SURGERY

## 2021-01-01 PROCEDURE — 93010 ELECTROCARDIOGRAM REPORT: CPT | Performed by: INTERNAL MEDICINE

## 2021-01-01 PROCEDURE — C1729 CATH, DRAINAGE: HCPCS | Performed by: SURGERY

## 2021-01-01 PROCEDURE — 6360000002 HC RX W HCPCS: Performed by: CLINICAL NURSE SPECIALIST

## 2021-01-01 PROCEDURE — 83690 ASSAY OF LIPASE: CPT

## 2021-01-01 PROCEDURE — 84100 ASSAY OF PHOSPHORUS: CPT

## 2021-01-01 PROCEDURE — 85014 HEMATOCRIT: CPT

## 2021-01-01 PROCEDURE — 6360000002 HC RX W HCPCS: Performed by: ANESTHESIOLOGY

## 2021-01-01 PROCEDURE — 82570 ASSAY OF URINE CREATININE: CPT

## 2021-01-01 PROCEDURE — 94002 VENT MGMT INPAT INIT DAY: CPT

## 2021-01-01 PROCEDURE — 99232 SBSQ HOSP IP/OBS MODERATE 35: CPT | Performed by: INTERNAL MEDICINE

## 2021-01-01 PROCEDURE — 84550 ASSAY OF BLOOD/URIC ACID: CPT

## 2021-01-01 PROCEDURE — 02HV33Z INSERTION OF INFUSION DEVICE INTO SUPERIOR VENA CAVA, PERCUTANEOUS APPROACH: ICD-10-PCS | Performed by: SURGERY

## 2021-01-01 PROCEDURE — C1751 CATH, INF, PER/CENT/MIDLINE: HCPCS

## 2021-01-01 PROCEDURE — 6370000000 HC RX 637 (ALT 250 FOR IP): Performed by: SURGERY

## 2021-01-01 PROCEDURE — 1200000000 HC SEMI PRIVATE

## 2021-01-01 PROCEDURE — 3E1M39Z IRRIGATION OF PERITONEAL CAVITY USING DIALYSATE, PERCUTANEOUS APPROACH: ICD-10-PCS | Performed by: INTERNAL MEDICINE

## 2021-01-01 PROCEDURE — 80048 BASIC METABOLIC PNL TOTAL CA: CPT

## 2021-01-01 PROCEDURE — 3700000001 HC ADD 15 MINUTES (ANESTHESIA): Performed by: SURGERY

## 2021-01-01 PROCEDURE — 80076 HEPATIC FUNCTION PANEL: CPT

## 2021-01-01 PROCEDURE — 83880 ASSAY OF NATRIURETIC PEPTIDE: CPT

## 2021-01-01 PROCEDURE — C8929 TTE W OR WO FOL WCON,DOPPLER: HCPCS

## 2021-01-01 PROCEDURE — 2500000003 HC RX 250 WO HCPCS: Performed by: ANESTHESIOLOGY

## 2021-01-01 PROCEDURE — 97530 THERAPEUTIC ACTIVITIES: CPT

## 2021-01-01 PROCEDURE — 80053 COMPREHEN METABOLIC PANEL: CPT

## 2021-01-01 PROCEDURE — 88307 TISSUE EXAM BY PATHOLOGIST: CPT

## 2021-01-01 PROCEDURE — 84478 ASSAY OF TRIGLYCERIDES: CPT

## 2021-01-01 PROCEDURE — 2580000003 HC RX 258: Performed by: ANESTHESIOLOGY

## 2021-01-01 PROCEDURE — 6370000000 HC RX 637 (ALT 250 FOR IP)

## 2021-01-01 PROCEDURE — 36556 INSERT NON-TUNNEL CV CATH: CPT

## 2021-01-01 PROCEDURE — 44141 PARTIAL REMOVAL OF COLON: CPT | Performed by: SURGERY

## 2021-01-01 PROCEDURE — 37799 UNLISTED PX VASCULAR SURGERY: CPT

## 2021-01-01 PROCEDURE — 84300 ASSAY OF URINE SODIUM: CPT

## 2021-01-01 PROCEDURE — 2580000003 HC RX 258

## 2021-01-01 PROCEDURE — 0DBL0ZZ EXCISION OF TRANSVERSE COLON, OPEN APPROACH: ICD-10-PCS | Performed by: SURGERY

## 2021-01-01 PROCEDURE — 2709999900 HC NON-CHARGEABLE SUPPLY: Performed by: SURGERY

## 2021-01-01 PROCEDURE — 02HV33Z INSERTION OF INFUSION DEVICE INTO SUPERIOR VENA CAVA, PERCUTANEOUS APPROACH: ICD-10-PCS | Performed by: INTERNAL MEDICINE

## 2021-01-01 RX ORDER — PANTOPRAZOLE SODIUM 40 MG/10ML
40 INJECTION, POWDER, LYOPHILIZED, FOR SOLUTION INTRAVENOUS DAILY
Status: DISCONTINUED | OUTPATIENT
Start: 2021-01-01 | End: 2021-01-01

## 2021-01-01 RX ORDER — SODIUM CHLORIDE, SODIUM GLUCONATE, SODIUM ACETATE, POTASSIUM CHLORIDE AND MAGNESIUM CHLORIDE 526; 502; 368; 37; 30 MG/100ML; MG/100ML; MG/100ML; MG/100ML; MG/100ML
INJECTION, SOLUTION INTRAVENOUS
Status: DISCONTINUED
Start: 2021-01-01 | End: 2021-01-01

## 2021-01-01 RX ORDER — MORPHINE SULFATE 2 MG/ML
2 INJECTION, SOLUTION INTRAMUSCULAR; INTRAVENOUS EVERY 30 MIN PRN
Status: DISCONTINUED | OUTPATIENT
Start: 2021-01-01 | End: 2021-01-01

## 2021-01-01 RX ORDER — ONDANSETRON 2 MG/ML
4 INJECTION INTRAMUSCULAR; INTRAVENOUS EVERY 30 MIN PRN
Status: DISCONTINUED | OUTPATIENT
Start: 2021-01-01 | End: 2021-01-01 | Stop reason: HOSPADM

## 2021-01-01 RX ORDER — 0.9 % SODIUM CHLORIDE 0.9 %
500 INTRAVENOUS SOLUTION INTRAVENOUS ONCE
Status: COMPLETED | OUTPATIENT
Start: 2021-01-01 | End: 2021-01-01

## 2021-01-01 RX ORDER — DEXTROSE MONOHYDRATE 50 MG/ML
100 INJECTION, SOLUTION INTRAVENOUS PRN
Status: DISCONTINUED | OUTPATIENT
Start: 2021-01-01 | End: 2021-01-01

## 2021-01-01 RX ORDER — OXYCODONE HYDROCHLORIDE AND ACETAMINOPHEN 5; 325 MG/1; MG/1
2 TABLET ORAL PRN
Status: DISCONTINUED | OUTPATIENT
Start: 2021-01-01 | End: 2021-01-01 | Stop reason: HOSPADM

## 2021-01-01 RX ORDER — LIDOCAINE HYDROCHLORIDE 10 MG/ML
5 INJECTION, SOLUTION INFILTRATION; PERINEURAL ONCE
Status: COMPLETED | OUTPATIENT
Start: 2021-01-01 | End: 2021-01-01

## 2021-01-01 RX ORDER — MEPERIDINE HYDROCHLORIDE 50 MG/ML
12.5 INJECTION INTRAMUSCULAR; INTRAVENOUS; SUBCUTANEOUS EVERY 5 MIN PRN
Status: DISCONTINUED | OUTPATIENT
Start: 2021-01-01 | End: 2021-01-01 | Stop reason: HOSPADM

## 2021-01-01 RX ORDER — POTASSIUM CHLORIDE 29.8 MG/ML
20 INJECTION INTRAVENOUS PRN
Status: DISCONTINUED | OUTPATIENT
Start: 2021-01-01 | End: 2021-01-01

## 2021-01-01 RX ORDER — PROPOFOL 10 MG/ML
INJECTION, EMULSION INTRAVENOUS PRN
Status: DISCONTINUED | OUTPATIENT
Start: 2021-01-01 | End: 2021-01-01

## 2021-01-01 RX ORDER — CARBOXYMETHYLCELLULOSE SODIUM 10 MG/ML
1 GEL OPHTHALMIC
Status: DISCONTINUED | OUTPATIENT
Start: 2021-01-01 | End: 2021-01-01

## 2021-01-01 RX ORDER — FENTANYL CITRATE 50 UG/ML
25 INJECTION, SOLUTION INTRAMUSCULAR; INTRAVENOUS
Status: CANCELLED | OUTPATIENT
Start: 2021-01-01

## 2021-01-01 RX ORDER — CALCIUM GLUCONATE 20 MG/ML
1 INJECTION, SOLUTION INTRAVENOUS PRN
Status: DISCONTINUED | OUTPATIENT
Start: 2021-01-01 | End: 2021-01-01

## 2021-01-01 RX ORDER — DEXTROSE MONOHYDRATE 25 G/50ML
12.5 INJECTION, SOLUTION INTRAVENOUS PRN
Status: DISCONTINUED | OUTPATIENT
Start: 2021-01-01 | End: 2021-01-01

## 2021-01-01 RX ORDER — SODIUM CHLORIDE 0.9 % (FLUSH) 0.9 %
10 SYRINGE (ML) INJECTION PRN
Status: DISCONTINUED | OUTPATIENT
Start: 2021-01-01 | End: 2021-01-01 | Stop reason: HOSPADM

## 2021-01-01 RX ORDER — MIDAZOLAM HYDROCHLORIDE 1 MG/ML
INJECTION INTRAMUSCULAR; INTRAVENOUS PRN
Status: DISCONTINUED | OUTPATIENT
Start: 2021-01-01 | End: 2021-01-01 | Stop reason: SDUPTHER

## 2021-01-01 RX ORDER — ROCURONIUM BROMIDE 10 MG/ML
INJECTION, SOLUTION INTRAVENOUS PRN
Status: DISCONTINUED | OUTPATIENT
Start: 2021-01-01 | End: 2021-01-01 | Stop reason: SDUPTHER

## 2021-01-01 RX ORDER — PROPOFOL 10 MG/ML
10 INJECTION, EMULSION INTRAVENOUS
Status: CANCELLED | OUTPATIENT
Start: 2021-01-01

## 2021-01-01 RX ORDER — SUCCINYLCHOLINE CHLORIDE 20 MG/ML
INJECTION INTRAMUSCULAR; INTRAVENOUS PRN
Status: DISCONTINUED | OUTPATIENT
Start: 2021-01-01 | End: 2021-01-01 | Stop reason: SDUPTHER

## 2021-01-01 RX ORDER — OXYCODONE HYDROCHLORIDE AND ACETAMINOPHEN 5; 325 MG/1; MG/1
1 TABLET ORAL PRN
Status: DISCONTINUED | OUTPATIENT
Start: 2021-01-01 | End: 2021-01-01 | Stop reason: HOSPADM

## 2021-01-01 RX ORDER — SODIUM CHLORIDE, SODIUM GLUCONATE, SODIUM ACETATE, POTASSIUM CHLORIDE AND MAGNESIUM CHLORIDE 526; 502; 368; 37; 30 MG/100ML; MG/100ML; MG/100ML; MG/100ML; MG/100ML
1000 INJECTION, SOLUTION INTRAVENOUS CONTINUOUS
Status: DISCONTINUED | OUTPATIENT
Start: 2021-01-01 | End: 2021-01-01

## 2021-01-01 RX ORDER — MAGNESIUM HYDROXIDE 1200 MG/15ML
LIQUID ORAL CONTINUOUS PRN
Status: COMPLETED | OUTPATIENT
Start: 2021-01-01 | End: 2021-01-01

## 2021-01-01 RX ORDER — INSULIN GLARGINE 100 [IU]/ML
20 INJECTION, SOLUTION SUBCUTANEOUS NIGHTLY
Status: DISCONTINUED | OUTPATIENT
Start: 2021-01-01 | End: 2021-01-01

## 2021-01-01 RX ORDER — SODIUM CHLORIDE 0.9 % (FLUSH) 0.9 %
10 SYRINGE (ML) INJECTION EVERY 12 HOURS SCHEDULED
Status: DISCONTINUED | OUTPATIENT
Start: 2021-01-01 | End: 2021-01-01 | Stop reason: HOSPADM

## 2021-01-01 RX ORDER — INSULIN GLARGINE 100 [IU]/ML
10 INJECTION, SOLUTION SUBCUTANEOUS NIGHTLY
Status: DISCONTINUED | OUTPATIENT
Start: 2021-01-01 | End: 2021-01-01

## 2021-01-01 RX ORDER — CALCIUM CHLORIDE 100 MG/ML
INJECTION INTRAVENOUS; INTRAVENTRICULAR PRN
Status: DISCONTINUED | OUTPATIENT
Start: 2021-01-01 | End: 2021-01-01 | Stop reason: SDUPTHER

## 2021-01-01 RX ORDER — ALBUMIN (HUMAN) 12.5 G/50ML
25 SOLUTION INTRAVENOUS EVERY 6 HOURS
Status: COMPLETED | OUTPATIENT
Start: 2021-01-01 | End: 2021-01-01

## 2021-01-01 RX ORDER — ONDANSETRON 2 MG/ML
4 INJECTION INTRAMUSCULAR; INTRAVENOUS EVERY 6 HOURS PRN
Status: DISCONTINUED | OUTPATIENT
Start: 2021-01-01 | End: 2021-01-01

## 2021-01-01 RX ORDER — PROPOFOL 10 MG/ML
10 INJECTION, EMULSION INTRAVENOUS
Status: DISCONTINUED | OUTPATIENT
Start: 2021-01-01 | End: 2021-01-01

## 2021-01-01 RX ORDER — CHLORHEXIDINE GLUCONATE 0.12 MG/ML
15 RINSE ORAL 2 TIMES DAILY
Status: DISCONTINUED | OUTPATIENT
Start: 2021-01-01 | End: 2021-01-01

## 2021-01-01 RX ORDER — ONDANSETRON 2 MG/ML
INJECTION INTRAMUSCULAR; INTRAVENOUS
Status: DISPENSED
Start: 2021-01-01 | End: 2021-01-01

## 2021-01-01 RX ORDER — MORPHINE SULFATE 2 MG/ML
2 INJECTION, SOLUTION INTRAMUSCULAR; INTRAVENOUS EVERY 30 MIN PRN
Status: DISCONTINUED | OUTPATIENT
Start: 2021-01-01 | End: 2021-01-01 | Stop reason: HOSPADM

## 2021-01-01 RX ORDER — CALCIUM GLUCONATE 20 MG/ML
1 INJECTION, SOLUTION INTRAVENOUS
Status: COMPLETED | OUTPATIENT
Start: 2021-01-01 | End: 2021-01-01

## 2021-01-01 RX ORDER — LABETALOL HYDROCHLORIDE 5 MG/ML
5 INJECTION, SOLUTION INTRAVENOUS
Status: DISCONTINUED | OUTPATIENT
Start: 2021-01-01 | End: 2021-01-01 | Stop reason: HOSPADM

## 2021-01-01 RX ORDER — POTASSIUM CHLORIDE 7.45 MG/ML
40 INJECTION INTRAVENOUS ONCE
Status: DISCONTINUED | OUTPATIENT
Start: 2021-01-01 | End: 2021-01-01

## 2021-01-01 RX ORDER — HYDROMORPHONE HCL 110MG/55ML
0.5 PATIENT CONTROLLED ANALGESIA SYRINGE INTRAVENOUS EVERY 10 MIN PRN
Status: DISCONTINUED | OUTPATIENT
Start: 2021-01-01 | End: 2021-01-01 | Stop reason: HOSPADM

## 2021-01-01 RX ORDER — SODIUM CHLORIDE, SODIUM GLUCONATE, SODIUM ACETATE, POTASSIUM CHLORIDE AND MAGNESIUM CHLORIDE 526; 502; 368; 37; 30 MG/100ML; MG/100ML; MG/100ML; MG/100ML; MG/100ML
INJECTION, SOLUTION INTRAVENOUS
Status: COMPLETED
Start: 2021-01-01 | End: 2021-01-01

## 2021-01-01 RX ORDER — LORAZEPAM 2 MG/ML
0.5 INJECTION INTRAMUSCULAR EVERY 30 MIN PRN
Status: DISCONTINUED | OUTPATIENT
Start: 2021-01-01 | End: 2021-01-01 | Stop reason: HOSPADM

## 2021-01-01 RX ORDER — HYDROMORPHONE HCL 110MG/55ML
0.5 PATIENT CONTROLLED ANALGESIA SYRINGE INTRAVENOUS EVERY 5 MIN PRN
Status: DISCONTINUED | OUTPATIENT
Start: 2021-01-01 | End: 2021-01-01 | Stop reason: HOSPADM

## 2021-01-01 RX ORDER — MAGNESIUM SULFATE 1 G/100ML
1 INJECTION INTRAVENOUS PRN
Status: DISCONTINUED | OUTPATIENT
Start: 2021-01-01 | End: 2021-01-01

## 2021-01-01 RX ORDER — PROPOFOL 10 MG/ML
INJECTION, EMULSION INTRAVENOUS
Status: DISCONTINUED
Start: 2021-01-01 | End: 2021-01-01

## 2021-01-01 RX ORDER — NICOTINE POLACRILEX 4 MG
15 LOZENGE BUCCAL PRN
Status: DISCONTINUED | OUTPATIENT
Start: 2021-01-01 | End: 2021-01-01

## 2021-01-01 RX ORDER — SODIUM CHLORIDE 9 MG/ML
INJECTION, SOLUTION INTRAVENOUS CONTINUOUS PRN
Status: DISCONTINUED | OUTPATIENT
Start: 2021-01-01 | End: 2021-01-01 | Stop reason: SDUPTHER

## 2021-01-01 RX ORDER — PROPOFOL 10 MG/ML
INJECTION, EMULSION INTRAVENOUS PRN
Status: DISCONTINUED | OUTPATIENT
Start: 2021-01-01 | End: 2021-01-01 | Stop reason: SDUPTHER

## 2021-01-01 RX ORDER — MAGNESIUM SULFATE 1 G/100ML
1 INJECTION INTRAVENOUS PRN
Status: DISCONTINUED | OUTPATIENT
Start: 2021-01-01 | End: 2021-01-01 | Stop reason: SDUPTHER

## 2021-01-01 RX ORDER — HYDRALAZINE HYDROCHLORIDE 20 MG/ML
5 INJECTION INTRAMUSCULAR; INTRAVENOUS EVERY 30 MIN PRN
Status: DISCONTINUED | OUTPATIENT
Start: 2021-01-01 | End: 2021-01-01 | Stop reason: HOSPADM

## 2021-01-01 RX ORDER — POTASSIUM CHLORIDE 7.45 MG/ML
10 INJECTION INTRAVENOUS PRN
Status: DISCONTINUED | OUTPATIENT
Start: 2021-01-01 | End: 2021-01-01

## 2021-01-01 RX ORDER — DIPHENHYDRAMINE HYDROCHLORIDE 50 MG/ML
6.25 INJECTION INTRAMUSCULAR; INTRAVENOUS
Status: DISCONTINUED | OUTPATIENT
Start: 2021-01-01 | End: 2021-01-01 | Stop reason: HOSPADM

## 2021-01-01 RX ADMIN — MORPHINE SULFATE 2 MG: 2 INJECTION, SOLUTION INTRAMUSCULAR; INTRAVENOUS at 07:59

## 2021-01-01 RX ADMIN — CALCIUM GLUCONATE 1 G: 20 INJECTION, SOLUTION INTRAVENOUS at 15:42

## 2021-01-01 RX ADMIN — PROPOFOL INJECTABLE EMULSION 30 MCG/KG/MIN: 10 INJECTION, EMULSION INTRAVENOUS at 16:37

## 2021-01-01 RX ADMIN — INSULIN LISPRO 12 UNITS: 100 INJECTION, SOLUTION INTRAVENOUS; SUBCUTANEOUS at 00:46

## 2021-01-01 RX ADMIN — PERFLUTREN 1.65 MG: 6.52 INJECTION, SUSPENSION INTRAVENOUS at 14:58

## 2021-01-01 RX ADMIN — ASCORBIC ACID, VITAMIN A PALMITATE, CHOLECALCIFEROL, THIAMINE HYDROCHLORIDE, RIBOFLAVIN-5 PHOSPHATE SODIUM, PYRIDOXINE HYDROCHLORIDE, NIACINAMIDE, DEXPANTHENOL, ALPHA-TOCOPHEROL ACETATE, VITAMIN K1, FOLIC ACID, BIOTIN, CYANOCOBALAMIN: 200; 3300; 200; 6; 3.6; 6; 40; 15; 10; 150; 600; 60; 5 INJECTION, SOLUTION INTRAVENOUS at 18:21

## 2021-01-01 RX ADMIN — MUPIROCIN: 20 OINTMENT TOPICAL at 11:05

## 2021-01-01 RX ADMIN — MEROPENEM 1 G: 1 INJECTION, POWDER, FOR SOLUTION INTRAVENOUS at 14:37

## 2021-01-01 RX ADMIN — MUPIROCIN: 20 OINTMENT TOPICAL at 07:45

## 2021-01-01 RX ADMIN — CARBOXYMETHYLCELLULOSE SODIUM 1 DROP: 10 GEL OPHTHALMIC at 12:07

## 2021-01-01 RX ADMIN — HEPARIN SODIUM 5000 UNITS: 5000 INJECTION INTRAVENOUS; SUBCUTANEOUS at 06:54

## 2021-01-01 RX ADMIN — SODIUM CHLORIDE, SODIUM GLUCONATE, SODIUM ACETATE, POTASSIUM CHLORIDE AND MAGNESIUM CHLORIDE 1000 ML: 526; 502; 368; 37; 30 INJECTION, SOLUTION INTRAVENOUS at 13:20

## 2021-01-01 RX ADMIN — HEPARIN SODIUM 5000 UNITS: 5000 INJECTION INTRAVENOUS; SUBCUTANEOUS at 13:38

## 2021-01-01 RX ADMIN — CALCIUM GLUCONATE 1 G: 20 INJECTION, SOLUTION INTRAVENOUS at 22:38

## 2021-01-01 RX ADMIN — INSULIN LISPRO 6 UNITS: 100 INJECTION, SOLUTION INTRAVENOUS; SUBCUTANEOUS at 06:23

## 2021-01-01 RX ADMIN — MORPHINE SULFATE 2 MG: 2 INJECTION, SOLUTION INTRAMUSCULAR; INTRAVENOUS at 22:39

## 2021-01-01 RX ADMIN — PROPOFOL INJECTABLE EMULSION 35 MCG/KG/MIN: 10 INJECTION, EMULSION INTRAVENOUS at 02:26

## 2021-01-01 RX ADMIN — ROCURONIUM BROMIDE 50 MG: 10 SOLUTION INTRAVENOUS at 12:03

## 2021-01-01 RX ADMIN — HEPARIN SODIUM 5000 UNITS: 5000 INJECTION INTRAVENOUS; SUBCUTANEOUS at 06:13

## 2021-01-01 RX ADMIN — SODIUM BICARBONATE 50 MEQ: 84 INJECTION, SOLUTION INTRAVENOUS at 12:02

## 2021-01-01 RX ADMIN — ALBUMIN (HUMAN) 25 G: 0.25 INJECTION, SOLUTION INTRAVENOUS at 22:25

## 2021-01-01 RX ADMIN — MEROPENEM 1 G: 1 INJECTION, POWDER, FOR SOLUTION INTRAVENOUS at 18:22

## 2021-01-01 RX ADMIN — FENTANYL CITRATE 5 MCG/HR: 50 INJECTION, SOLUTION INTRAMUSCULAR; INTRAVENOUS at 02:45

## 2021-01-01 RX ADMIN — Medication 15 ML: at 07:45

## 2021-01-01 RX ADMIN — MIDAZOLAM HYDROCHLORIDE 3 MG: 2 INJECTION, SOLUTION INTRAMUSCULAR; INTRAVENOUS at 12:07

## 2021-01-01 RX ADMIN — DEXTROSE MONOHYDRATE 45 MCG/MIN: 50 INJECTION, SOLUTION INTRAVENOUS at 02:40

## 2021-01-01 RX ADMIN — SODIUM CHLORIDE, SODIUM GLUCONATE, SODIUM ACETATE, POTASSIUM CHLORIDE AND MAGNESIUM CHLORIDE 1000 ML: 526; 502; 368; 37; 30 INJECTION, SOLUTION INTRAVENOUS at 13:47

## 2021-01-01 RX ADMIN — SODIUM PHOSPHATE, MONOBASIC, MONOHYDRATE AND SODIUM PHOSPHATE, DIBASIC, ANHYDROUS 12 MMOL: 276; 142 INJECTION, SOLUTION INTRAVENOUS at 21:16

## 2021-01-01 RX ADMIN — CARBOXYMETHYLCELLULOSE SODIUM 1 DROP: 10 GEL OPHTHALMIC at 07:45

## 2021-01-01 RX ADMIN — CARBOXYMETHYLCELLULOSE SODIUM 1 DROP: 10 GEL OPHTHALMIC at 23:43

## 2021-01-01 RX ADMIN — MORPHINE SULFATE 2 MG: 2 INJECTION, SOLUTION INTRAMUSCULAR; INTRAVENOUS at 23:37

## 2021-01-01 RX ADMIN — PANTOPRAZOLE SODIUM 40 MG: 40 INJECTION, POWDER, FOR SOLUTION INTRAVENOUS at 08:21

## 2021-01-01 RX ADMIN — MEROPENEM 1 G: 1 INJECTION, POWDER, FOR SOLUTION INTRAVENOUS at 01:55

## 2021-01-01 RX ADMIN — SODIUM CHLORIDE, PRESERVATIVE FREE 10 ML: 5 INJECTION INTRAVENOUS at 08:22

## 2021-01-01 RX ADMIN — PROPOFOL INJECTABLE EMULSION 20 MCG/KG/MIN: 10 INJECTION, EMULSION INTRAVENOUS at 14:32

## 2021-01-01 RX ADMIN — MORPHINE SULFATE 2 MG: 2 INJECTION, SOLUTION INTRAMUSCULAR; INTRAVENOUS at 18:56

## 2021-01-01 RX ADMIN — EPINEPHRINE 12 MCG/MIN: 1 INJECTION PARENTERAL at 06:29

## 2021-01-01 RX ADMIN — PROPOFOL INJECTABLE EMULSION 25 MCG/KG/MIN: 10 INJECTION, EMULSION INTRAVENOUS at 19:19

## 2021-01-01 RX ADMIN — Medication 15 ML: at 22:25

## 2021-01-01 RX ADMIN — SODIUM CHLORIDE: 9 INJECTION, SOLUTION INTRAVENOUS at 07:15

## 2021-01-01 RX ADMIN — MORPHINE SULFATE 2 MG: 2 INJECTION, SOLUTION INTRAMUSCULAR; INTRAVENOUS at 14:52

## 2021-01-01 RX ADMIN — I.V. FAT EMULSION 250 ML: 20 EMULSION INTRAVENOUS at 18:27

## 2021-01-01 RX ADMIN — SODIUM CHLORIDE, SODIUM GLUCONATE, SODIUM ACETATE, POTASSIUM CHLORIDE AND MAGNESIUM CHLORIDE 1000 ML: 526; 502; 368; 37; 30 INJECTION, SOLUTION INTRAVENOUS at 04:47

## 2021-01-01 RX ADMIN — PROPOFOL INJECTABLE EMULSION 35 MCG/KG/MIN: 10 INJECTION, EMULSION INTRAVENOUS at 06:22

## 2021-01-01 RX ADMIN — SODIUM CHLORIDE, PRESERVATIVE FREE 10 ML: 5 INJECTION INTRAVENOUS at 20:27

## 2021-01-01 RX ADMIN — INSULIN LISPRO 12 UNITS: 100 INJECTION, SOLUTION INTRAVENOUS; SUBCUTANEOUS at 07:47

## 2021-01-01 RX ADMIN — POTASSIUM PHOSPHATE, MONOBASIC AND POTASSIUM PHOSPHATE, DIBASIC 15 MMOL: 224; 236 INJECTION, SOLUTION, CONCENTRATE INTRAVENOUS at 18:22

## 2021-01-01 RX ADMIN — FENTANYL CITRATE 50 MCG/HR: 50 INJECTION, SOLUTION INTRAMUSCULAR; INTRAVENOUS at 05:25

## 2021-01-01 RX ADMIN — DEXTROSE MONOHYDRATE 14 MCG/MIN: 50 INJECTION, SOLUTION INTRAVENOUS at 09:29

## 2021-01-01 RX ADMIN — SODIUM CHLORIDE, PRESERVATIVE FREE 10 ML: 5 INJECTION INTRAVENOUS at 08:45

## 2021-01-01 RX ADMIN — HEPARIN SODIUM 5000 UNITS: 5000 INJECTION INTRAVENOUS; SUBCUTANEOUS at 14:29

## 2021-01-01 RX ADMIN — MAGNESIUM SULFATE HEPTAHYDRATE 1 G: 1 INJECTION, SOLUTION INTRAVENOUS at 18:22

## 2021-01-01 RX ADMIN — HEPARIN SODIUM 5000 UNITS: 5000 INJECTION INTRAVENOUS; SUBCUTANEOUS at 00:29

## 2021-01-01 RX ADMIN — INSULIN LISPRO 3 UNITS: 100 INJECTION, SOLUTION INTRAVENOUS; SUBCUTANEOUS at 17:35

## 2021-01-01 RX ADMIN — Medication 500 ML/HR: at 17:05

## 2021-01-01 RX ADMIN — MORPHINE SULFATE 2 MG: 2 INJECTION, SOLUTION INTRAMUSCULAR; INTRAVENOUS at 10:35

## 2021-01-01 RX ADMIN — HYDROMORPHONE HYDROCHLORIDE 0.25 MG: 2 INJECTION INTRAMUSCULAR; INTRAVENOUS; SUBCUTANEOUS at 06:54

## 2021-01-01 RX ADMIN — VASOPRESSIN 0.04 UNITS/MIN: 20 INJECTION INTRAVENOUS at 15:06

## 2021-01-01 RX ADMIN — FENTANYL CITRATE 25 MCG/HR: 50 INJECTION, SOLUTION INTRAMUSCULAR; INTRAVENOUS at 13:43

## 2021-01-01 RX ADMIN — INSULIN LISPRO 9 UNITS: 100 INJECTION, SOLUTION INTRAVENOUS; SUBCUTANEOUS at 20:48

## 2021-01-01 RX ADMIN — SODIUM BICARBONATE 50 MEQ: 84 INJECTION, SOLUTION INTRAVENOUS at 12:07

## 2021-01-01 RX ADMIN — HEPARIN SODIUM 5000 UNITS: 5000 INJECTION INTRAVENOUS; SUBCUTANEOUS at 05:47

## 2021-01-01 RX ADMIN — Medication 2000 ML/HR: at 17:05

## 2021-01-01 RX ADMIN — HYDROMORPHONE HYDROCHLORIDE 0.25 MG: 2 INJECTION INTRAMUSCULAR; INTRAVENOUS; SUBCUTANEOUS at 03:56

## 2021-01-01 RX ADMIN — EPINEPHRINE 6 MCG/MIN: 1 INJECTION PARENTERAL at 06:27

## 2021-01-01 RX ADMIN — SODIUM BICARBONATE 50 MEQ: 84 INJECTION, SOLUTION INTRAVENOUS at 11:57

## 2021-01-01 RX ADMIN — PROPOFOL INJECTABLE EMULSION 30 MCG/KG/MIN: 10 INJECTION, EMULSION INTRAVENOUS at 22:08

## 2021-01-01 RX ADMIN — EPINEPHRINE 1 MCG/MIN: 1 INJECTION PARENTERAL at 14:31

## 2021-01-01 RX ADMIN — PANTOPRAZOLE SODIUM 40 MG: 40 INJECTION, POWDER, FOR SOLUTION INTRAVENOUS at 17:22

## 2021-01-01 RX ADMIN — PROPOFOL INJECTABLE EMULSION 35 MCG/KG/MIN: 10 INJECTION, EMULSION INTRAVENOUS at 11:04

## 2021-01-01 RX ADMIN — INSULIN GLARGINE 10 UNITS: 100 INJECTION, SOLUTION SUBCUTANEOUS at 02:27

## 2021-01-01 RX ADMIN — Medication 10 ML: at 08:22

## 2021-01-01 RX ADMIN — EPINEPHRINE 12 MCG/MIN: 1 INJECTION PARENTERAL at 22:58

## 2021-01-01 RX ADMIN — PROPOFOL 75 MG: 10 INJECTION, EMULSION INTRAVENOUS at 11:54

## 2021-01-01 RX ADMIN — SODIUM CHLORIDE 500 ML: 9 INJECTION, SOLUTION INTRAVENOUS at 15:02

## 2021-01-01 RX ADMIN — Medication 2000 ML/HR: at 22:36

## 2021-01-01 RX ADMIN — DEXTROSE MONOHYDRATE 30 MCG/MIN: 50 INJECTION, SOLUTION INTRAVENOUS at 21:27

## 2021-01-01 RX ADMIN — POTASSIUM CHLORIDE 10 MEQ: 10 INJECTION, SOLUTION INTRAVENOUS at 09:44

## 2021-01-01 RX ADMIN — MEROPENEM 1 G: 1 INJECTION, POWDER, FOR SOLUTION INTRAVENOUS at 01:17

## 2021-01-01 RX ADMIN — Medication 10 ML: at 22:25

## 2021-01-01 RX ADMIN — Medication 2000 ML/HR: at 04:00

## 2021-01-01 RX ADMIN — Medication 500 ML/HR: at 04:00

## 2021-01-01 RX ADMIN — ALBUMIN (HUMAN) 25 G: 0.25 INJECTION, SOLUTION INTRAVENOUS at 06:13

## 2021-01-01 RX ADMIN — SODIUM PHOSPHATE, MONOBASIC, MONOHYDRATE AND SODIUM PHOSPHATE, DIBASIC, ANHYDROUS 6 MMOL: 276; 142 INJECTION, SOLUTION INTRAVENOUS at 03:29

## 2021-01-01 RX ADMIN — Medication 15 ML: at 14:38

## 2021-01-01 RX ADMIN — SODIUM CHLORIDE: 9 INJECTION, SOLUTION INTRAVENOUS at 12:08

## 2021-01-01 RX ADMIN — VASOPRESSIN 0.04 UNITS/MIN: 20 INJECTION INTRAVENOUS at 23:43

## 2021-01-01 RX ADMIN — CALCIUM GLUCONATE 1 G: 20 INJECTION, SOLUTION INTRAVENOUS at 20:03

## 2021-01-01 RX ADMIN — MAGNESIUM SULFATE HEPTAHYDRATE 1 G: 1 INJECTION, SOLUTION INTRAVENOUS at 20:09

## 2021-01-01 RX ADMIN — ONDANSETRON 4 MG: 2 INJECTION INTRAMUSCULAR; INTRAVENOUS at 02:18

## 2021-01-01 RX ADMIN — MORPHINE SULFATE 2 MG: 2 INJECTION, SOLUTION INTRAMUSCULAR; INTRAVENOUS at 19:44

## 2021-01-01 RX ADMIN — ALBUMIN (HUMAN) 25 G: 0.25 INJECTION, SOLUTION INTRAVENOUS at 10:43

## 2021-01-01 RX ADMIN — CARBOXYMETHYLCELLULOSE SODIUM 1 DROP: 10 GEL OPHTHALMIC at 16:37

## 2021-01-01 RX ADMIN — Medication 10 ML: at 08:00

## 2021-01-01 RX ADMIN — MEROPENEM 1 G: 1 INJECTION, POWDER, FOR SOLUTION INTRAVENOUS at 13:20

## 2021-01-01 RX ADMIN — HEPARIN SODIUM 5000 UNITS: 5000 INJECTION INTRAVENOUS; SUBCUTANEOUS at 15:10

## 2021-01-01 RX ADMIN — PANTOPRAZOLE SODIUM 40 MG: 40 INJECTION, POWDER, FOR SOLUTION INTRAVENOUS at 07:37

## 2021-01-01 RX ADMIN — INSULIN LISPRO 5 UNITS: 100 INJECTION, SOLUTION INTRAVENOUS; SUBCUTANEOUS at 22:31

## 2021-01-01 RX ADMIN — MEROPENEM 1 G: 1 INJECTION, POWDER, FOR SOLUTION INTRAVENOUS at 14:28

## 2021-01-01 RX ADMIN — MORPHINE SULFATE 2 MG: 2 INJECTION, SOLUTION INTRAMUSCULAR; INTRAVENOUS at 16:22

## 2021-01-01 RX ADMIN — HEPARIN SODIUM 5000 UNITS: 5000 INJECTION INTRAVENOUS; SUBCUTANEOUS at 15:06

## 2021-01-01 RX ADMIN — PROPOFOL INJECTABLE EMULSION 30 MCG/KG/MIN: 10 INJECTION, EMULSION INTRAVENOUS at 02:24

## 2021-01-01 RX ADMIN — SODIUM CHLORIDE: 9 INJECTION, SOLUTION INTRAVENOUS at 05:28

## 2021-01-01 RX ADMIN — DEXTROSE MONOHYDRATE 2 MCG/MIN: 50 INJECTION, SOLUTION INTRAVENOUS at 18:44

## 2021-01-01 RX ADMIN — POTASSIUM CHLORIDE 10 MEQ: 10 INJECTION, SOLUTION INTRAVENOUS at 08:00

## 2021-01-01 RX ADMIN — PROCHLORPERAZINE EDISYLATE 10 MG: 5 INJECTION INTRAMUSCULAR; INTRAVENOUS at 05:28

## 2021-01-01 RX ADMIN — INSULIN GLARGINE 10 UNITS: 100 INJECTION, SOLUTION SUBCUTANEOUS at 20:51

## 2021-01-01 RX ADMIN — INSULIN LISPRO 5 UNITS: 100 INJECTION, SOLUTION INTRAVENOUS; SUBCUTANEOUS at 01:22

## 2021-01-01 RX ADMIN — CALCIUM GLUCONATE 1 G: 20 INJECTION, SOLUTION INTRAVENOUS at 17:32

## 2021-01-01 RX ADMIN — ASCORBIC ACID, VITAMIN A PALMITATE, CHOLECALCIFEROL, THIAMINE HYDROCHLORIDE, RIBOFLAVIN-5 PHOSPHATE SODIUM, PYRIDOXINE HYDROCHLORIDE, NIACINAMIDE, DEXPANTHENOL, ALPHA-TOCOPHEROL ACETATE, VITAMIN K1, FOLIC ACID, BIOTIN, CYANOCOBALAMIN: 200; 3300; 200; 6; 3.6; 6; 40; 15; 10; 150; 600; 60; 5 INJECTION, SOLUTION INTRAVENOUS at 18:48

## 2021-01-01 RX ADMIN — CARBOXYMETHYLCELLULOSE SODIUM 1 DROP: 10 GEL OPHTHALMIC at 07:37

## 2021-01-01 RX ADMIN — I.V. FAT EMULSION 250 ML: 20 EMULSION INTRAVENOUS at 23:42

## 2021-01-01 RX ADMIN — POTASSIUM CHLORIDE 20 MEQ: 400 INJECTION, SOLUTION INTRAVENOUS at 18:32

## 2021-01-01 RX ADMIN — Medication: at 18:14

## 2021-01-01 RX ADMIN — VASOPRESSIN 0.04 UNITS/MIN: 20 INJECTION INTRAVENOUS at 05:26

## 2021-01-01 RX ADMIN — HEPARIN SODIUM 5000 UNITS: 5000 INJECTION INTRAVENOUS; SUBCUTANEOUS at 06:22

## 2021-01-01 RX ADMIN — VASOPRESSIN 0.04 UNITS/MIN: 20 INJECTION INTRAVENOUS at 20:23

## 2021-01-01 RX ADMIN — MIDAZOLAM HYDROCHLORIDE 2 MG: 2 INJECTION, SOLUTION INTRAMUSCULAR; INTRAVENOUS at 11:59

## 2021-01-01 RX ADMIN — DEXTROSE MONOHYDRATE 22 MCG/MIN: 50 INJECTION, SOLUTION INTRAVENOUS at 07:46

## 2021-01-01 RX ADMIN — DEXTROSE MONOHYDRATE 60 MCG/MIN: 50 INJECTION, SOLUTION INTRAVENOUS at 21:28

## 2021-01-01 RX ADMIN — MORPHINE SULFATE 2 MG: 2 INJECTION, SOLUTION INTRAMUSCULAR; INTRAVENOUS at 05:09

## 2021-01-01 RX ADMIN — ALBUMIN (HUMAN) 25 G: 0.25 INJECTION, SOLUTION INTRAVENOUS at 18:33

## 2021-01-01 RX ADMIN — SODIUM CHLORIDE 500 ML: 9 INJECTION, SOLUTION INTRAVENOUS at 10:43

## 2021-01-01 RX ADMIN — VASOPRESSIN 0.04 UNITS/MIN: 20 INJECTION INTRAVENOUS at 10:46

## 2021-01-01 RX ADMIN — VASOPRESSIN 0.04 UNITS/MIN: 20 INJECTION INTRAVENOUS at 14:25

## 2021-01-01 RX ADMIN — LIDOCAINE HYDROCHLORIDE 5 ML: 10 INJECTION, SOLUTION INFILTRATION; PERINEURAL at 17:15

## 2021-01-01 RX ADMIN — CARBOXYMETHYLCELLULOSE SODIUM 1 DROP: 10 GEL OPHTHALMIC at 20:49

## 2021-01-01 RX ADMIN — CALCIUM CHLORIDE 1 G: 100 INJECTION, SOLUTION INTRAVENOUS at 11:52

## 2021-01-01 RX ADMIN — HEPARIN SODIUM 5000 UNITS: 5000 INJECTION INTRAVENOUS; SUBCUTANEOUS at 20:49

## 2021-01-01 RX ADMIN — POTASSIUM CHLORIDE 20 MEQ: 400 INJECTION, SOLUTION INTRAVENOUS at 20:03

## 2021-01-01 RX ADMIN — SODIUM CHLORIDE, PRESERVATIVE FREE 10 ML: 5 INJECTION INTRAVENOUS at 09:29

## 2021-01-01 RX ADMIN — ASCORBIC ACID, VITAMIN A PALMITATE, CHOLECALCIFEROL, THIAMINE HYDROCHLORIDE, RIBOFLAVIN-5 PHOSPHATE SODIUM, PYRIDOXINE HYDROCHLORIDE, NIACINAMIDE, DEXPANTHENOL, ALPHA-TOCOPHEROL ACETATE, VITAMIN K1, FOLIC ACID, BIOTIN, CYANOCOBALAMIN: 200; 3300; 200; 6; 3.6; 6; 40; 15; 10; 150; 600; 60; 5 INJECTION, SOLUTION INTRAVENOUS at 18:27

## 2021-01-01 RX ADMIN — POTASSIUM CHLORIDE 10 MEQ: 10 INJECTION, SOLUTION INTRAVENOUS at 11:44

## 2021-01-01 RX ADMIN — Medication 10 ML: at 07:46

## 2021-01-01 RX ADMIN — MORPHINE SULFATE 2 MG: 2 INJECTION, SOLUTION INTRAMUSCULAR; INTRAVENOUS at 21:04

## 2021-01-01 RX ADMIN — INSULIN LISPRO 18 UNITS: 100 INJECTION, SOLUTION INTRAVENOUS; SUBCUTANEOUS at 13:37

## 2021-01-01 RX ADMIN — HEPARIN SODIUM 5000 UNITS: 5000 INJECTION INTRAVENOUS; SUBCUTANEOUS at 05:25

## 2021-01-01 RX ADMIN — POTASSIUM CHLORIDE 20 MEQ: 400 INJECTION, SOLUTION INTRAVENOUS at 15:06

## 2021-01-01 RX ADMIN — CALCIUM GLUCONATE 1 G: 20 INJECTION, SOLUTION INTRAVENOUS at 06:19

## 2021-01-01 RX ADMIN — MEROPENEM 1 G: 1 INJECTION, POWDER, FOR SOLUTION INTRAVENOUS at 01:23

## 2021-01-01 RX ADMIN — MORPHINE SULFATE 2 MG: 2 INJECTION, SOLUTION INTRAMUSCULAR; INTRAVENOUS at 03:18

## 2021-01-01 RX ADMIN — INSULIN LISPRO 3 UNITS: 100 INJECTION, SOLUTION INTRAVENOUS; SUBCUTANEOUS at 08:42

## 2021-01-01 RX ADMIN — SODIUM PHOSPHATE, MONOBASIC, MONOHYDRATE AND SODIUM PHOSPHATE, DIBASIC, ANHYDROUS 30 MMOL: 276; 142 INJECTION, SOLUTION INTRAVENOUS at 10:43

## 2021-01-01 RX ADMIN — Medication 10 ML: at 08:40

## 2021-01-01 RX ADMIN — POTASSIUM CHLORIDE 10 MEQ: 10 INJECTION, SOLUTION INTRAVENOUS at 07:12

## 2021-01-01 RX ADMIN — SODIUM BICARBONATE 50 MEQ: 84 INJECTION, SOLUTION INTRAVENOUS at 11:52

## 2021-01-01 RX ADMIN — MUPIROCIN: 20 OINTMENT TOPICAL at 20:49

## 2021-01-01 RX ADMIN — HEPARIN SODIUM 5000 UNITS: 5000 INJECTION INTRAVENOUS; SUBCUTANEOUS at 22:45

## 2021-01-01 RX ADMIN — INSULIN LISPRO 12 UNITS: 100 INJECTION, SOLUTION INTRAVENOUS; SUBCUTANEOUS at 05:29

## 2021-01-01 RX ADMIN — Medication 2000 ML/HR: at 20:30

## 2021-01-01 RX ADMIN — HEPARIN SODIUM 5000 UNITS: 5000 INJECTION INTRAVENOUS; SUBCUTANEOUS at 21:51

## 2021-01-01 RX ADMIN — PANTOPRAZOLE SODIUM 40 MG: 40 INJECTION, POWDER, FOR SOLUTION INTRAVENOUS at 08:41

## 2021-01-01 RX ADMIN — INSULIN LISPRO 3 UNITS: 100 INJECTION, SOLUTION INTRAVENOUS; SUBCUTANEOUS at 05:47

## 2021-01-01 RX ADMIN — SODIUM CHLORIDE, SODIUM GLUCONATE, SODIUM ACETATE, POTASSIUM CHLORIDE AND MAGNESIUM CHLORIDE 1000 ML: 526; 502; 368; 37; 30 INJECTION, SOLUTION INTRAVENOUS at 20:44

## 2021-01-01 RX ADMIN — METOPROLOL SUCCINATE 25 MG: 25 TABLET, EXTENDED RELEASE ORAL at 09:28

## 2021-01-01 RX ADMIN — INSULIN LISPRO 2 UNITS: 100 INJECTION, SOLUTION INTRAVENOUS; SUBCUTANEOUS at 14:30

## 2021-01-01 RX ADMIN — CALCIUM GLUCONATE 1 G: 20 INJECTION, SOLUTION INTRAVENOUS at 02:53

## 2021-01-01 RX ADMIN — SODIUM CHLORIDE: 9 INJECTION, SOLUTION INTRAVENOUS at 11:41

## 2021-01-01 RX ADMIN — Medication 2000 ML/HR: at 01:12

## 2021-01-01 RX ADMIN — PANTOPRAZOLE SODIUM 40 MG: 40 INJECTION, POWDER, FOR SOLUTION INTRAVENOUS at 07:45

## 2021-01-01 RX ADMIN — MEROPENEM 1 G: 1 INJECTION, POWDER, FOR SOLUTION INTRAVENOUS at 01:50

## 2021-01-01 RX ADMIN — SUCCINYLCHOLINE CHLORIDE 100 MG: 20 INJECTION, SOLUTION INTRAMUSCULAR; INTRAVENOUS at 11:56

## 2021-01-01 ASSESSMENT — PULMONARY FUNCTION TESTS
PIF_VALUE: 28
PIF_VALUE: 35
PIF_VALUE: 2
PIF_VALUE: 25
PIF_VALUE: 25
PIF_VALUE: 28
PIF_VALUE: 2
PIF_VALUE: 2
PIF_VALUE: 25
PIF_VALUE: 25
PIF_VALUE: 2
PIF_VALUE: 28
PIF_VALUE: 25
PIF_VALUE: 25
PIF_VALUE: 28
PIF_VALUE: 2
PIF_VALUE: 25
PIF_VALUE: 20
PIF_VALUE: 28
PIF_VALUE: 25
PIF_VALUE: 25
PIF_VALUE: 2
PIF_VALUE: 57
PIF_VALUE: 25
PIF_VALUE: 25
PIF_VALUE: 2
PIF_VALUE: 2
PIF_VALUE: 28
PIF_VALUE: 25
PIF_VALUE: 20
PIF_VALUE: 25
PIF_VALUE: 28
PIF_VALUE: 28
PIF_VALUE: 25
PIF_VALUE: 28
PIF_VALUE: 28
PIF_VALUE: 25
PIF_VALUE: 20
PIF_VALUE: 28
PIF_VALUE: 20
PIF_VALUE: 28
PIF_VALUE: 28
PIF_VALUE: 25
PIF_VALUE: 15
PIF_VALUE: 2
PIF_VALUE: 20
PIF_VALUE: 25
PIF_VALUE: 20
PIF_VALUE: 25
PIF_VALUE: 39
PIF_VALUE: 25
PIF_VALUE: 25
PIF_VALUE: 2
PIF_VALUE: 28
PIF_VALUE: 25
PIF_VALUE: 25
PIF_VALUE: 28

## 2021-01-01 ASSESSMENT — PAIN SCALES - GENERAL
PAINLEVEL_OUTOF10: 0
PAINLEVEL_OUTOF10: 1
PAINLEVEL_OUTOF10: 0
PAINLEVEL_OUTOF10: 6
PAINLEVEL_OUTOF10: 5
PAINLEVEL_OUTOF10: 6
PAINLEVEL_OUTOF10: 0
PAINLEVEL_OUTOF10: 0

## 2021-01-01 NOTE — PROGRESS NOTES
Hospitalist Progress Note      PCP: Geoff Herbert MD    Date of Admission: 12/27/2020    Chief Complaint: Constipation, abdominal pain, n/v    Hospital Course: Reviewed H&P     Subjective: Patient S/p left colectomy with anastomosis and mobilization of splenic flexure on 12/27/2020 for obstructing transverse colon mass. Patient more lethargic, weak with shallow respirations this morning. X-ray from yesterday suggestive of postoperative ileus. Patient currently requiring up to 3 L/min oxygen by nasal cannula. Blood pressure soft this morning with significant leukocytosis. Obtain chest x-ray which showed pneumoperitoneum. D/w general surgery PA (Dr. Rupali Malagon) and patient transferred to ICU for closer Monitoring . Trend blood work including lactic 2.5 . Ordered 500 cc fluid bolus , blood cultures x2 and started on empiric IV Merrem. General surgery ordered stat CT abdomen pelvis without contrast for further delineation -CT abdomen pelvis showed possible postoperative  Pneumoperitoneum but no focal fluid collections suggestive of abscess. Critical care consulted to assist with management.    NG tube ordered again on 1/1/2021by Surgery      Medications:  Reviewed    Infusion Medications    norepinephrine      sodium chloride 50 mL/hr at 01/01/21 0942    bupivacaine 0.5%       Scheduled Medications    sodium chloride  500 mL Intravenous Once    meropenem  1 g Intravenous Q12H    lidocaine 1 % injection  5 mL Intradermal Once    sodium chloride flush  10 mL Intravenous 2 times per day    potassium phosphate IVPB  15 mmol Intravenous Once    pantoprazole  40 mg Intravenous Daily    [Held by provider] metoprolol succinate  25 mg Oral Daily    sodium chloride flush  10 mL Intravenous 2 times per day    heparin (porcine)  5,000 Units Subcutaneous 3 times per day PRN Meds: ondansetron, potassium chloride, magnesium sulfate, sodium chloride flush, HYDROmorphone, sodium chloride flush, polyethylene glycol, acetaminophen **OR** acetaminophen, prochlorperazine      Intake/Output Summary (Last 24 hours) at 1/1/2021 1517  Last data filed at 1/1/2021 0958  Gross per 24 hour   Intake 1789.59 ml   Output 275 ml   Net 1514.59 ml       Physical Exam Performed:  BP (!) 64/50   Pulse (!) 39   Temp 100 °F (37.8 °C) (Axillary)   Resp 20   Ht 5' 4\" (1.626 m)   Wt 210 lb 5.1 oz (95.4 kg)   SpO2 93%   BMI 36.10 kg/m²     General appearance: In Mild   distress, appears stated age and cooperative. Oxygen by nasal cannula 3 L/min  Respiratory: Shallow breathing, normal respiratory effort. Clear to auscultation, bilaterally without Rales/Wheezes/Rhonchi. Cardiovascular: Regular rate and rhythm with normal S1/S2 without murmurs, rubs or gallops. Abdomen: hypoactive bowel sounds, soft, non-distended, tenderness noted appropriate, onQ ball in place   Musculoskeletal: No clubbing, cyanosis or edema bilaterally.  Full range of motion without deformity. Skin: Skin color, texture, turgor normal.  No rashes or lesions. Neurologic: Lethargic neurovascularly intact without any focal sensory/motor deficits.  Cranial nerves: II-XII intact, grossly non-focal.  Psychiatric: Alert and oriented, intermittently getting confused        Labs:   Recent Labs     12/30/20  0547 12/31/20  0557 01/01/21  0751   WBC 11.6* 16.0* 26.9*   HGB 10.4* 10.9* 11.7*   HCT 32.2* 33.9* 36.5    308 384     Recent Labs     12/31/20  0557 01/01/21  0751 01/01/21  1238    137 136   K 3.2* 3.3* 3.5   CL 98* 96* 101   CO2 23 26 15*   BUN 34* 41* 42*   CREATININE 1.2 1.5* 1.6*   CALCIUM 9.2 8.7 7.9*   PHOS  --   --  2.4*     Urinalysis:    Lab Results   Component Value Date    NITRU Negative 12/27/2020    BLOODU Negative 12/27/2020    SPECGRAV 1.020 12/27/2020    GLUCOSEU Negative 12/27/2020       Radiology: CT ABDOMEN PELVIS WO CONTRAST Additional Contrast? None   Final Result   Postsurgical changes from partial colon resection. Small amount of free   fluid within the pelvis. No defined pelvic or abdominal collection. Small amount of free air within the abdomen. Findings may be postsurgical   given recent procedure. Dilated loops of small bowel without a discrete transition point noted to   suggest high-grade obstruction findings may represent ileus. Very small bilateral pleural effusions and atelectasis in the lower lobes. XR CHEST PORTABLE   Final Result   Pneumoperitoneum is now appreciated. This is favored to be postoperative,   noting laparotomy clips on 12/30/2020. Clinical correlation is recommended. Bibasilar atelectasis. XR ACUTE ABD SERIES CHEST 1 VW   Final Result   Minimal left basilar atelectasis. Interval postsurgical changes noted over   the mid abdomen with prominent appearing bowel loops likely indicative of   ileus versus developing partial small bowel obstruction. No   pneumoperitoneum. Recommend comparison with clinical exam and short-term   interval follow-up study. Interval removal of the feeding tube. XR ABDOMEN (KUB) (SINGLE AP VIEW)   Final Result   Gastric tube in the stomach         CT ABDOMEN PELVIS WO CONTRAST Additional Contrast? None   Final Result   1. 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 There is at least partial bowel obstruction at the level of the lesion with   proximal colonic and small bowel distension. The lesion is best visualized   on series 601, image 38.   2. No other acute findings within the abdomen or pelvis. 3. Status post cholecystectomy and hysterectomy.                Active Hospital Problems    Diagnosis Date Noted    SIRS (systemic inflammatory response syndrome) (HCC) [R65.10] 01/01/2021    Bandemia [D72.825] 01/01/2021    Acute respiratory distress [R06.03] 01/01/2021  Lactic acidosis [E87.2] 01/01/2021    Pneumoperitoneum [K66.8] 01/01/2021    Acute-on-chronic kidney injury ([facility] [address] [N17.9, N18.9] 01/01/2021    Atelectasis [J98.11] 01/01/2021    Small bowel obstruction ([facility] [address] [K56.609] 12/27/2020    Class 2 obesity in adult [E66.9] 02/05/2018     Assessment/Plan:  Invasive colonic adenocarcinoma  complicated by large bowel obstruction POA  - surgery consulted - S/p left colectomy with anastomosis and mobilization of splenic flexure on 12/27/2020 for obstructing transverse colon mass on 12/28/20 . POD # 3 .   - NG tube d/consuelo on 12/29/20. General surgery started on clear liquid diet . Patient developed postoperative ileus on 12/31/2020 . Placed back n.p.o. follow-up x-ray on 1/1/2021 suggestive of mild pneumoperitoneum. General surgery made aware and placed back and NG-tube on 1/1/2021 and connected to LIS  -Continue pain control.  -Oncology Consulted - Added MSI (micro satellite instability) screening and recommended outpatient follow-up    Sepsis -noted on 1/1/2021. Exact etiology unclear -likely GI/ source in the postoperative setting  -As evidenced by significant leukocytosis 29K, elevated lactic 2.5; hypotension, tachycardia  -Ordered fluid bolus and transferred patient to ICU for further closer monitoring on 1/1/2021  -Consider Levophed gtt., art line for better blood pressure monitoring, intensive care consultation to assist with management  -Ordered blood cultures x2, empirically started on IV Merrem. -CT abdomen pelvis Noncon -showed mild pneumoperitoneum likely related to post surgical changes but no focal fluid collection suggestive of abscess.     CHELSEA due to dehydration from above, in the setting of CKD3 -initially improved and now worsening  -Admission creatinine 2.6;  Improved with  IVFs.  Creatinine down to 1.2 today and started worsening today 1.5; Baseline Cr is perhaps 1.4.     Hypovolemic hyponatremia  POA - Na 128 on admission - Resolved -Improved with IVFs. Baseline Na is normal.     HTN  - held HCTZ while on IVFs. - continued bisoprolol (metoprolol here) and amlodipine ; Held again on 1/1/2021 given hypotension      HLD  - statin    DVT Prophylaxis: Lovenox   Diet: Diet NPO Effective Now Exceptions are: Ice Chips, Sips with Meds  Code Status: Full Code    PT/OT Eval Status: Valuated on 12/29/2020 with recommendations to home with home care    Dispo -likely  4- 5  days pending postop course, p.o. tolerance and clearance by general surgery. The note was completed using Dragon -speech recognition software & EMR  . Every effort was made to ensure accuracy; however, inadvertent computerized transcription errors may be present.     Juliet La MD

## 2021-01-01 NOTE — PROGRESS NOTES
MD Zapien Abts Back at bedside. Aware that that has been unable to obtain cultures and blood due to poor access. Gave OK to wait until PICC was placed and to draw both from Line.

## 2021-01-01 NOTE — ACP (ADVANCE CARE PLANNING)
Advanced Care Planning Note. Purpose of Encounter: Advanced care planning in light of Acute Hypoxic respiratory Failure secondary to Post-Op Atelectasis / infection , Post-Op ileus , S/p Colectomy for Colonic Obstruction from Colon cancer (denovo diagnosis)  , CHELSEA   Parties in attendance:   Patient (Mee Mayfield), Dr. Lolly Villalpando  (myself). Daughter (Faisal Yates) over the phone at [phone]  Decisional Capacity: Yes . Patient deferred further discussions to Daughter  SHAHID CASEY CHICHI RN/HCPOA)       Subjective/Patient Story: Patient/Family understands that her medical condition continues to deteriorate. She/he no longer wishes further curative interventions, including hospitalization, if there is no long term benefit : No  Patient/Family wishes to continue further interventions, patient will re-evaluate at a later time: Yes     Subjective and Objective Medical history :   Patient seen and examined  General appearance: In Mild   distress, appears stated age and cooperative. Oxygen by nasal cannula 3 L/min  Respiratory: Shallow breathing, normal respiratory effort. Clear to auscultation, bilaterally without Rales/Wheezes/Rhonchi. Cardiovascular: Regular rate and rhythm with normal S1/S2 without murmurs, rubs or gallops. Abdomen: hypoactive bowel sounds, soft, non-distended, tenderness noted appropriate, onQ ball in place   Musculoskeletal: No clubbing, cyanosis or edema bilaterally.  Full range of motion without deformity. Skin: Skin color, texture, turgor normal.  No rashes or lesions. Neurologic: Lethargic neurovascularly intact without any focal sensory/motor deficits.  Cranial nerves: II-XII intact, grossly non-focal.  Psychiatric: Alert and oriented, intermittently getting confused      Goals of Care Determinations: Patient/Family wishes to focus on full code at this time. I discussed initially in detail with patient regarding her clinical status, she wishes to be full code but further discussions she deferred to her daughter [facility]. Call and spoke to daughter at length and updated her about current condition and regarding patient's advanced directives. Daughter would like to speak with mom (patient ) and get back to the team with their final decision regarding wishes. ~Time being patient to be full code.     Plan:  As above     Code Status: At this time patient/Family  wishes to be full code.     Time Spent on Advanced Planning Documents: 20 mins. The following items were considered in medical decision making:  Independent review of images , Review / order clinical lab tests. Review / order radiology tests, Decision to obtain old records. Advanced Care Planning Documents: Completed advances directives, advanced directives in chart.       Bryan Ross MD  1/1/2021

## 2021-01-01 NOTE — PROGRESS NOTES
Spoke with MD Lorraine Davies to discuss placement of a PICC. MD Lorraine Davies gave approval for PICC line and consent is signed.

## 2021-01-01 NOTE — PROGRESS NOTES
Pt returned from CT scan. Lab notified for blood draws before antibiotics are to begin. Also rechecking a repeat BMP and electrolytes in the case she requires surgery.

## 2021-01-01 NOTE — PROGRESS NOTES
CT reviewed and discussed results and plan with patient/family. After <500ml bolus hypotension resolved. CT with pneumoperitoneum but could be postsurgical.  At this point will continue with IVF , NG and start IV abx. Repeat lactate later today. If hemodynamics were to worsen then would plan on returning to OR.     Sukumar Simmons MD

## 2021-01-01 NOTE — PROGRESS NOTES
Physical/Occupational Therapy    Patient today had a rapid response and was transferred to the ICU due to hypotension. Patient will need new PT/OT evaluation orders for patient to be re-evaluated when patient is medically stable. Thank you. PT signing off.      Cuong Arriola PT  Que Baldwin OT

## 2021-01-01 NOTE — PROGRESS NOTES
[employer] to hospitalist: /117; administered PO scheduled metoprolol. HR sounds irregular (could be just some PVC's or something but ordered 12 lead). She does feel slightly dyspneic at rest; expiratory wheezing noted (this is new). She was 88% on room air; placed on 2L NC and is up to 90%. Sgy did put her back to NPO yesterday as she has been having liquid emesis and they restarted her IVF with NS at 75 due to NPO status. I do not hear crackles; no hx of chf per pt. Waiting response. [place] to bedside; see new orders.

## 2021-01-01 NOTE — CONSULTS
History reviewed. No pertinent family history. Social History     Tobacco Use    Smoking status: Never Smoker    Smokeless tobacco: Never Used   Substance Use Topics    Alcohol use: No        No Known Allergies            Physical Exam:  Blood pressure (!) 64/50, pulse (!) 39, temperature 100.2 °F (37.9 °C), temperature source Axillary, resp. rate 20, height 5' 4\" (1.626 m), weight 210 lb 5.1 oz (95.4 kg), SpO2 93 %.'  Patient's pulse was 96 when seen    Constitutional: In respite distress when seen   HENT:  Oropharynx is clear and moist. No thyromegaly. Eyes:  Conjunctivae are normal. Pupils equal, round, and reactive to light. No scleral icterus. Neck: . No tracheal deviation present. No obvious thyroid mass. Short enlarged neck  Cardiovascular: Normal rate, regular rhythm, normal heart sounds. No right ventricular heave. No lower extremity edema. Pulmonary/Chest: No wheezes. Scattered rales. Chest wall is not dull to percussion. Distinct accessory muscle usage, no stridor. Decreased breath rest of the basis  Abdominal: Distended, surgical staples present, patient has significant diffuse tenderness with questionable rebound tenderness  Musculoskeletal: No cyanosis. No clubbing. No obvious joint deformity. Lymphadenopathy: No cervical or supraclavicular adenopathy. Skin: Skin is warm and dry. No rash or nodules on the exposed extremities. Psychiatric: Normal mood and affect. Behavior is normal.  Slight anxiety. Neurologic: Alert, awake and oriented. PERRL.   Speech fluent        Results:  CBC:   Recent Labs     12/30/20  0547 12/31/20  0557 01/01/21  0751   WBC 11.6* 16.0* 26.9*   HGB 10.4* 10.9* 11.7*   HCT 32.2* 33.9* 36.5   MCV 83.9 83.8 83.5    308 384     BMP:   Recent Labs     12/31/20  0557 01/01/21  0751 01/01/21  1238    137 136   K 3.2* 3.3* 3.5   CL 98* 96* 101   CO2 23 26 15*   PHOS  --   --  2.4*   BUN 34* 41* 42*   CREATININE 1.2 1.5* 1.6*       Imaging: EXAMINATION: CT OF THE ABDOMEN AND PELVIS WITHOUT CONTRAST 12/27/2020 5:42 pm TECHNIQUE: CT of the abdomen and pelvis was performed without the administration of intravenous contrast. Multiplanar reformatted images are provided for review. Dose modulation, iterative reconstruction, and/or weight based adjustment of the mA/kV was utilized to reduce the radiation dose to as low as reasonably achievable. COMPARISON: None. HISTORY: ORDERING SYSTEM PROVIDED HISTORY: lower abd pain with constipation TECHNOLOGIST PROVIDED HISTORY: Reason for exam:->lower abd pain with constipation Additional Contrast?->None Reason for Exam: patient complains of stomach pain since last tuesday, no bowel movement since last monday, patient also vomits when she hiccups Acuity: Acute Type of Exam: Initial Relevant Medical/Surgical History: hx of CKD FINDINGS: Lower Chest: Mild dependent lower lobe opacification, likely atelectasis. Organs: The gallbladder is surgically absent. No significant biliary dilatation. Scattered granulomata are noted in the liver and spleen. The spleen is not enlarged. The pancreas is atrophic. The adrenal glands are unremarkable. There is no evidence of obstructive uropathy. Mild symmetric bilateral renal atrophy. GI/Bowel: Annular lesion in the mid transverse colon concerning for a colonic neoplasm. This is best seen on series 601, image 38. There is resultant partial colonic obstruction. Moderate distention of the colon proximal to the lesion with multiple distended fluid-filled small bowel loops. No perienteric inflammatory changes. Maximum cecal diameter 8.9 cm. Distal to the lesion, there is colonic diverticulosis with no acute features. Pelvis: No pelvic mass or free pelvic fluid. The uterus is absent. Mild distention of the urinary bladder. Peritoneum/Retroperitoneum: The abdominal aorta is normal in caliber with mild atherosclerotic plaque.   No retroperitoneal adenopathy or upper abdominal ascites. Bones/Soft Tissues: No acute osseous or soft tissue abnormality. Ankylosis at L5-S1.     1. 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 There is at least partial bowel obstruction at the level of the lesion with proximal colonic and small bowel distension. The lesion is best visualized on series 601, image 38. 2. No other acute findings within the abdomen or pelvis. 3. Status post cholecystectomy and hysterectomy. Xr Abdomen (kub) (single Ap View)    Result Date: 12/27/2020  EXAMINATION: ONE SUPINE XRAY VIEW(S) OF THE ABDOMEN 12/27/2020 7:57 pm COMPARISON: CT abdomen and pelvis same day HISTORY: ORDERING SYSTEM PROVIDED HISTORY: NGT placement verification TECHNOLOGIST PROVIDED HISTORY: Reason for exam:->NGT placement verification Reason for Exam: NGT placement verification Acuity: Acute Type of Exam: Initial FINDINGS: There is a gastric tube with the tip and side-port in the stomach. Distended bowel loops are demonstrated in the upper abdomen. Atelectasis present in the lung bases.   No free air     Gastric tube in the stomach     Xr Acute Abd Series Chest 1 Vw    Result Date: 12/31/2020 Pneumoperitoneum is now appreciated. This is favored to be postoperative, noting laparotomy clips on 12/30/2020. Clinical correlation is recommended. Bibasilar atelectasis. Results for David Graham (MRN [id number]) as of 1/1/2021 13:28   Ref. Range 12/30/2020 05:47 12/31/2020 05:57 1/1/2021 07:51   Sodium Latest Ref Range: 136 - 145 mmol/L 139 138 137   Potassium Latest Ref Range: 3.5 - 5.1 mmol/L 3.5 3.2 (L) 3.3 (L)   Chloride Latest Ref Range: 99 - 110 mmol/L 103 98 (L) 96 (L)   CO2 Latest Ref Range: 21 - 32 mmol/L 24 23 26   BUN Latest Ref Range: 7 - 20 mg/dL 38 (H) 34 (H) 41 (H)   Creatinine Latest Ref Range: 0.6 - 1.2 mg/dL 1.4 (H) 1.2 1.5 (H)   Anion Gap Latest Ref Range: 3 - 16  12 17 (H) 15   GFR Non- Latest Ref Range: >60  36 (A) 43 (A) 34 (A)   GFR  Latest Ref Range: >60  44 (A) 53 (A) 41 (A)   Magnesium Latest Ref Range: 1.80 - 2.40 mg/dL 1.60 (L) 1.80 1.70 (L)   Glucose Latest Ref Range: 70 - 99 mg/dL 104 (H) 150 (H) 180 (H)   Calcium Latest Ref Range: 8.3 - 10.6 mg/dL 9.1 9.2 8.7     Results for David Graham (MRN [id number]) as of 1/1/2021 13:28   Ref.  Range 12/29/2020 11:04 12/30/2020 05:47 12/31/2020 05:57 1/1/2021 07:51   WBC Latest Ref Range: 4.0 - 11.0 K/uL 12.2 (H) 11.6 (H) 16.0 (H) 26.9 (H)   RBC Latest Ref Range: 4.00 - 5.20 M/uL 4.40 3.84 (L) 4.05 4.38   Hemoglobin Quant Latest Ref Range: 12.0 - 16.0 g/dL 11.9 (L) 10.4 (L) 10.9 (L) 11.7 (L)   Hematocrit Latest Ref Range: 36.0 - 48.0 % 37.5 32.2 (L) 33.9 (L) 36.5   MCV Latest Ref Range: 80.0 - 100.0 fL 85.3 83.9 83.8 83.5   MCH Latest Ref Range: 26.0 - 34.0 pg 27.0 27.1 27.0 26.7   MCHC Latest Ref Range: 31.0 - 36.0 g/dL 31.6 32.4 32.2 32.0   MPV Latest Ref Range: 5.0 - 10.5 fL 8.1 7.5 7.7 8.5   RDW Latest Ref Range: 12.4 - 15.4 % 14.4 14.6 14.5 14.8   Platelet Count Latest Ref Range: 135 - 450 K/uL 295 283 308 384   Neutrophils % Latest Units: % 77.5 75.0 83.2 86.0 Lymphocyte % Latest Units: % 12.1 12.0 9.8 2.0   Monocytes % Latest Units: % 10.1 7.0 6.4 2.0   Eosinophils % Latest Units: % 0.1 2.0 0.3 0.0     Results for Gena Singer (MRN [id number]) as of 1/1/2021 13:28   Ref. Range 2/5/2018 14:20 12/27/2020 18:54 12/28/2020 10:48   Urine Reflex to Culture Unknown  Not Indicated    Rapid Influenza A Ag Latest Ref Range: Negative  Negative     Rapid Influenza B Ag Latest Ref Range: Negative  Negative     RAPID INFLUENZA A/B ANTIGENS Unknown Rpt     COVID-19 Unknown   Rpt   SARS-CoV-2, NAAT Latest Ref Range: Not Detected    Not Detected     Results for Gena Singer (MRN [id number]) as of 1/1/2021 13:28   Ref. Range 2/5/2018 14:20 1/1/2021 12:38   Lactic Acid Latest Ref Range: 0.4 - 2.0 mmol/L 1.7 2.5 (H)       Results for Gena Singer (MRN [id number]) as of 1/1/2021 13:28   Ref. Range 2/5/2018 14:20 1/1/2021 12:38   Pro-BNP Latest Ref Range: 0 - 449 pg/mL 317 1,344 (H)   Troponin Latest Ref Range: <0.01 ng/mL <0.01      CT OF THE ABDOMEN AND PELVIS WITHOUT CONTRAST 12/27/2020 5:42 pm       TECHNIQUE:   CT of the abdomen and pelvis was performed without the administration of   intravenous contrast. Multiplanar reformatted images are provided for review. Dose modulation, iterative reconstruction, and/or weight based adjustment of   the mA/kV was utilized to reduce the radiation dose to as low as reasonably   achievable.       COMPARISON:   None.       HISTORY:   ORDERING SYSTEM PROVIDED HISTORY: lower abd pain with constipation   TECHNOLOGIST PROVIDED HISTORY:   Reason for exam:->lower abd pain with constipation   Additional Contrast?->None   Reason for Exam: patient complains of stomach pain since last tuesday, no   bowel movement since last monday, patient also vomits when she hiccups   Acuity: Acute   Type of Exam: Initial   Relevant Medical/Surgical History: hx of CKD       FINDINGS:   Lower Chest: Mild dependent lower lobe opacification, likely atelectasis.      Organs: The gallbladder is surgically absent.  No significant biliary   dilatation.  Scattered granulomata are noted in the liver and spleen.  The   spleen is not enlarged.  The pancreas is atrophic.  The adrenal glands are   unremarkable. [place] is no evidence of obstructive uropathy.  Mild symmetric   bilateral renal atrophy.       GI/Bowel: Annular lesion in the mid transverse colon concerning for a colonic   neoplasm.  This is best seen on series 601, image 45.  There is resultant   partial colonic obstruction.  Moderate distention of the colon proximal to   the lesion with multiple distended fluid-filled small bowel loops.  No   perienteric inflammatory changes.  Maximum cecal diameter 8.9 cm.  Distal to   the lesion, there is colonic diverticulosis with no acute features.       Pelvis: No pelvic mass or free pelvic fluid.  The uterus is absent.  Mild   distention of the urinary bladder.       Peritoneum/Retroperitoneum: The abdominal aorta is normal in caliber with   mild atherosclerotic plaque.  No retroperitoneal adenopathy or upper   abdominal ascites.       Bones/Soft Tissues: No acute osseous or soft tissue abnormality.  Ankylosis   at L5-S1.           Impression   1. 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 There is at least partial bowel obstruction at the level of the lesion with   proximal colonic and small bowel distension.  The lesion is best visualized   on series 601, image 38.   2. No other acute findings within the abdomen or pelvis. 3. Status post cholecystectomy and hysterectomy.      ONE XRAY VIEW OF THE CHEST       1/1/2021 10:10 am       COMPARISON:   12/31/2020, chest x-ray and abdominal x-ray       HISTORY:   ORDERING SYSTEM PROVIDED HISTORY: sob   TECHNOLOGIST PROVIDED HISTORY:   Reason for exam:->sob       FINDINGS:   Cardiac silhouette mediastinal contours are normal.  There is minimal   linear/patchy opacity in the lung bases.     Phosphorus Latest Ref Range: 2.5 - 4.9 mg/dL    2.4 (L)         Echocardiogram: July 2020-The left ventricular wall motion is normal.  There is mild mitral regurgitation. The Aortic Valve is mildly calcified. Mild valvular aortic stenosis. Overall left ventricular ejection fraction is estimated to be 55-60%. PFT: None in Epic         Assessment:  Principal Problem:    Small bowel obstruction (HCC)  Active Problems:    Class 2 obesity in adult    SIRS (systemic inflammatory response syndrome) (HCC)    Bandemia    Acute respiratory distress    Lactic acidosis    Pneumoperitoneum    Acute-on-chronic kidney injury ([address]    Atelectasis  Resolved Problems:    * No resolved hospital problems.  *          Plan:   · Oxygen supplementation, if required, to keep saturation between 90 to 94% only  · Pulmonary toilet  · Monitor for any worsening respiratory status as patient is having profound increase in work of breathing when seen  · Patient is getting IV fluids when seen to improve blood pressure and the patient's blood pressure does not improve with IV fluids then patient may require IV pressors to keep mean arterial pressure more than 65 mmHg  · Patient was evaluated by surgery and a CT abdomen has been ordered on a stat basis and if patient has any worsening pneumoperitoneum or any signs of perforation then patient may require to be going to the OR  · Patient has been started on IV meropenem which can be continued-titration as per clinical status and cultures  · No need for any additional IV Flagyl at this time  · NG tube to intermittent suction to continue  · Patient's lactic acid levels to be trended  · Patient WBC count has doubled as compared to yesterday and needs to be trended  · Patient renal function is deteriorating and needs to be trended  · Monitor input output and BMP  · Correct electrolytes on whenever necessary basis  · K-Phos ordered

## 2021-01-01 NOTE — PROGRESS NOTES
NGT placed per MD order. Placed to [facility]. Immediatly had output of >1600 ML dark brown liquid. Pt tolerated well.

## 2021-01-01 NOTE — PROGRESS NOTES
[facility] GENERAL SURGERY    Surgery Progress Note           POD # 5    PATIENT NAME: Cathy Mcnally     TODAY'S DATE: 1/1/2021    INTERVAL HISTORY:    Pt with hypotension and confusion this morning. OBJECTIVE:   VITALS:  BP (!) 64/50   Pulse (!) 39   Temp 97.7 °F (36.5 °C) (Oral)   Resp 20   Ht 5' 4\" (1.626 m)   Wt 210 lb 5.1 oz (95.4 kg)   SpO2 92%   BMI 36.10 kg/m²     INTAKE/OUTPUT:    I/O last 3 completed shifts: In: 1789.6 [P.O.:120; I.V.:1669.6]  Out: 450 [Urine:350; Emesis/NG output:100]  I/O this shift:  In: -   Out: 100 [Emesis/NG output:100]              CONSTITUTIONAL:  awake and alert  LUNGS:  no crackles  ABDOMEN:   hypoactive bowel sounds, soft, distended, tenderness noted diffusely   INCISION: clean    Data:  CBC:   Recent Labs     12/30/20  0547 12/31/20  0557 01/01/21  0751   WBC 11.6* 16.0* 26.9*   HGB 10.4* 10.9* 11.7*   HCT 32.2* 33.9* 36.5    308 384     BMP:    Recent Labs     12/30/20  0547 12/31/20  0557 01/01/21  0751    138 137   K 3.5 3.2* 3.3*    98* 96*   CO2 24 23 26   BUN 38* 34* 41*   CREATININE 1.4* 1.2 1.5*   GLUCOSE 104* 150* 180*     Hepatic: No results for input(s): AST, ALT, ALB, BILITOT, ALKPHOS in the last 72 hours. Mag:      Recent Labs     12/30/20  0547 12/31/20  0557 01/01/21  0751   MG 1.60* 1.80 1.70*      Phos:   No results for input(s): PHOS in the last 72 hours. INR: No results for input(s): INR in the last 72 hours. Radiology Review:  NA    ASSESSMENT AND PLAN:  68 y.o. female status post left colectomy  1. NG replaced with about 3l output  2. CT to rule out anastomotic issues - if leak then return to OR  3.   Start broad spectrum abx  4.  BP improved with IVF         Electronically signed by José Clifton MD

## 2021-01-01 NOTE — PROGRESS NOTES
Occupational Therapy  Facility/Department: [facility] C3 TELE/MED SURG/ONC  Daily Treatment Note  NAME: Sharon Montes  : 1940  MRN: [id number]    Date of Service: 2021    Discharge Recommendations:  24 hour supervision or assist, Home with Home health OT, Home with nursing aide       Assessment   Performance deficits / Impairments: Decreased functional mobility ; Decreased balance;Decreased ADL status; Decreased endurance;Decreased high-level IADLs  Assessment: Pt agreeable to OT despite reports of not feeling well. Required slight increase assist with supine>EOB and stand pivot transfer. Demo BP drop with OOB and required return to supine. RN informed and addressing pt's medical needs. Cont per OT poc as pt medically appropriate. OT Education: OT Role;Plan of Care;Transfer Training;Energy Conservation; ADL Adaptive Strategies  Patient Education: log roll, importance of OOB activity, home safety concerns, OT discharge recommendations, hand placement for transfers  Barriers to Learning: pt demonstrates and verbalizes understanding. Activity Tolerance: Treatment limited secondary to medical complications   Pt BP dropped when OOB to chair. BP readings as follows:   Supine, at session start: 116/80, . BP EOB: 101/73,  (asymptomatic). BP after txfr to chair: 72/59, . BP after sitting in chair for a few minutes: 68/52, then 63/45 (asymptomatic). Pt assisted back to bed. BP after returning to supine: 87/60, . Then eventually up to 97/54, . Pt on 1.5L O2 via NC throughout session. SpO2 sats 92-97%. RN present after pt in chair and remained with pt as OT left.     Safety Devices  Type of devices: Left in bed;Bed alarm in place;Call light within reach;Nurse notified Patient Diagnosis(es): The primary encounter diagnosis was Small bowel obstruction ([facility] [address] Diagnoses of CHELSEA (acute kidney injury) ([address] Abdominal pain, lower, Abnormal CT of the abdomen, Hypokalemia, Hyponatremia, Non-intractable vomiting with nausea, unspecified vomiting type, and Complete intestinal obstruction, unspecified cause ([address] were also pertinent to this visit. has a past medical history of CKD (chronic kidney disease) stage 3, GFR 30-59 ml/min and Hypertension. has a past surgical history that includes hemicolectomy (Right, 12/28/2020). Restrictions  Restrictions/Precautions  Restrictions/Precautions: Fall Risk, Up as Tolerated, General Precautions  Position Activity Restriction  Other position/activity restrictions: clear liquid diet, up as tolerated  Subjective   General  Chart Reviewed: Yes  Patient assessed for rehabilitation services?: Yes  Additional Pertinent Hx: CKD  Family / Caregiver Present: No  Diagnosis: Pt admitted with suspected colon cancer and obstruction of transverse colon mass s/p L colectomy with mobilization of splenic flexure on 12/28. Pt also with CHELSEA due to dehydration, and hyponatremia. Subjective  Subjective: Pt supine in bed upon OT arrival. Pt reports feeling tired, but agreeable to therapy session.  Requesting to get OOB  General Comment  Comments: RN cleared pt for therapy, reporting recent EKG was normal  Pain Assessment  Pain Assessment: 0-10  Pain Level: 5  Pain Location: Abdomen            Balance  Sitting Balance: Modified independent   Standing Balance: Minimal assistance  Bed mobility  Supine to Sit: Moderate assistance  Sit to Supine: Maximum assistance  Scooting: Minimal assistance  Transfers  Stand Pivot Transfers: Minimal assistance              Plan   Plan  Times per week: 3-5x/week Current Treatment Recommendations: Strengthening, Pain Management, Positioning, Gait Training, Safety Education & Training, Balance Training, Patient/Caregiver Education & Training, Self-Care / ADL, Functional Mobility Training, Equipment Evaluation, Education, & procurement, Home Management Training, Endurance Training    AM-PAC Score        AM-PAC Inpatient Daily Activity Raw Score: 13 (01/01/21 1213)  AM-PAC Inpatient ADL T-Scale Score : 32.03 (01/01/21 1213)  ADL Inpatient CMS 0-100% Score: 63.03 (01/01/21 1213)  ADL Inpatient CMS G-Code Modifier : CL (01/01/21 1213)    Goals  Short term goals  Time Frame for Short term goals: 2 weeks (1/12/21)  Short term goal 1: Supervision functional transfers to/from EOB, chair, toilet; ongoing, 12/30 CGA using RW  Short term goal 2: Min A LB dressing with AE as needed; ongoing  Short term goal 3: SBA toileting; ongoing  Short term goal 4: SBA grooming in stance at sink; ongoing, 12/30 SBA 2 minutes standing at sink, requires sitting to complete grooming  Patient Goals   Patient goals : to be able to walk to the bathroom on her own       Therapy Time   Individual Concurrent Group Co-treatment   Time In 1055         Time Out 1151         Minutes 56         Timed Code Treatment Minutes: 10 Mango Parsons OT     If patient is discharged prior to next occupational therapy treatment, this note will serve as the patient's discharge summary.

## 2021-01-02 PROBLEM — R65.21 SEPTIC SHOCK (HCC): Status: ACTIVE | Noted: 2021-01-01

## 2021-01-02 PROBLEM — A41.9 SEPTIC SHOCK (HCC): Status: ACTIVE | Noted: 2021-01-01

## 2021-01-02 NOTE — CONSULTS
RD received consult for \"TPN ordering and mgmt\". Dietitians following with nutrition assessment and recommendations in RD progress notes. TPN RECOMMENDATIONS:  · Recommend continue to check electrolytes K, Mg, Phos, and replace as needed. · Bag 1: Clinimix 5/20 E starting at 30 mL/hr  · Physician/LIP to monitor closely and correct lytes (Phos,Mg,K+) d/t risk of refeeding syndrome  · Bag 2: As long as electrolytes WNL, advance Clinimix 5/20 E to goal rate of 65 mL/hr  · Recommend 250 mL 20% lipids 3 times per week  · Recommend FSBS, monitor glucose, need for insulin  · Pharmacy to adjust MVI and Trace Elements as needed   · When PN to be discontinued, cut rate by 50% and let current bag run out. · Current Parenteral Nutrition Orders:  · Type and Formula: Premix [facility]   · Lipids: Three times weekly  · Goal PN Orders Provides: Clinimix 5/20 at goal rate of 65 mL/hr to provide 1373 kcal and 78 g protein. 250 mL bags of 20% lipids 3 times weekly to porivde additional 214 kcal daily. GIR: 2.28      Will continue to monitor per nutrition standards of care. Tracy Dowling, [address] [place] [address],  on 1/2/2021 at 3:51 PM  Office: [phone]

## 2021-01-02 NOTE — CONSULTS
Thank you to requesting provider: Dr. Pankaj Moon , for asking us to see Cherelle Treadwell  Reason for consultation:  Acute Kidney Injury  Chief Complaint:  Abdominal pain, constipation     History of Presenting Illness      67 y/o female with history of CKD stage 3 admitted on 12/27 with abdominal pain and constipation. Work up showed large bowel obstruction with new diagnosis of colon cancer with obstructing mass, she had never had colonoscopy. On 12/28 she was taken for left colectomy. She initially did have acute kidney injury which improved with IV fluids. Over the last 24 hours she has deteriorated and she is now in the ICU. She is on pressors. Had 4 liters of NG output yesterday with a negative fluid balance of 3 liters. On 3 liters NC. She is having abdominal pain. She is on antibiotics. Repeat imaging showing likely ileus with small amount of free air. Urine output has trended to anuric overnight and Scr is up to 2.8.       Past Medical/Surgical History      Active Ambulatory Problems     Diagnosis Date Noted    Bronchopneumonia 02/05/2018    Hypoxia 02/05/2018    CKD (chronic kidney disease) 02/05/2018    Class 2 obesity in adult 02/05/2018     Resolved Ambulatory Problems     Diagnosis Date Noted    No Resolved Ambulatory Problems     Past Medical History:   Diagnosis Date    CKD (chronic kidney disease) stage 3, GFR 30-59 ml/min     Hypertension          Review of Systems     Constitutional:  No weight loss, no fever/chills  Eyes:  No eye pain, no eye redness  Cardiovascular:  No chest pain, no worsening of edema  Respiratory:  No hemoptysis, + SOB   Gastrointestinal:  No blood in stool, + abdominal pain, + n/v   Genitoruinary:  No hematuria, low urine volume   Musculoskeletal:  No joint swelling, no redness  Integumentary:  No Rash, no itching  Neurological:  No focal weakness, No new sensory deficit  Psychiatric:  No depression, no confusion  Endocrine:  No polyuria, no polydipsia Medications      Reviewed in EMR     Allergies     Patient has no known allergies. Family History       Negative for Kidney Disease    Social History      Social History     Socioeconomic History    Marital status:      Spouse name: None    Number of children: None    Years of education: None    Highest education level: None   Occupational History    None   Social Needs    Financial resource strain: None    Food insecurity     Worry: None     Inability: None    Transportation needs     Medical: None     Non-medical: None   Tobacco Use    Smoking status: Never Smoker    Smokeless tobacco: Never Used   Substance and Sexual Activity    Alcohol use: No    Drug use: No    Sexual activity: None   Lifestyle    Physical activity     Days per week: None     Minutes per session: None    Stress: None   Relationships    Social connections     Talks on phone: None     Gets together: None     Attends Restorationist service: None     Active member of club or organization: None     Attends meetings of clubs or organizations: None     Relationship status: None    Intimate partner violence     Fear of current or ex partner: None     Emotionally abused: None     Physically abused: None     Forced sexual activity: None   Other Topics Concern    None   Social History Narrative    None       Physical Exam     Blood pressure (!) 96/50, pulse 106, temperature 98.7 °F (37.1 °C), temperature source Axillary, resp. rate 25, height 5' 4\" (1.626 m), weight 218 lb 0.6 oz (98.9 kg), SpO2 93 %.     General:  Ill appearing   HEENT:  PERRL, EOMI  Neck:  Supple, normal range of movement  Chest:  No distress, decreased BS in bases, on 3 liters NC   CV:  Tachycardic, no rub   Abdomen:  NTND, soft, +BS, no hepatosplenomegaly  Extremities:  Trace peripheral edema  Neurological:  Moving all four extremities, CN II-XII grossly intact  Lymphatics:  No palpable lymph nodes  Skin:  No rash, no jaundice Psychiatric:  Flat affect, alert   :  Jj in place     Data     Recent Labs     12/31/20  0557 01/01/21  0751 01/02/21  0430   WBC 16.0* 26.9* 19.7*   HGB 10.9* 11.7* 10.2*   HCT 33.9* 36.5 31.4*   MCV 83.8 83.5 83.7    384 263     Recent Labs     01/01/21  1238 01/01/21  1714 01/02/21  0430    140 146*   K 3.5 3.7 3.2*  3.2*    97* 99   CO2 15* 30 36*   GLUCOSE 160* 154* 177*   PHOS 2.4* 2.0*  --    MG 1.50* 1.40* 2.10   BUN 42* 49* 57*   CREATININE 1.6* 2.3* 2.8*   LABGLOM 31* 20* 16*   GFRAA 38* 25* 20*       Assessment:    Acute Kidney Injury:  KDIGO stage 2  - Etiology:  I suspect pre-renal vs. ATN  - Clinical:  Electrolyte pattern is pre-renal with metabolic alkalosis, hypokalemia, and hypernatremia     Metabolic Alkalosis:  Due to high NG output and chloride depletion     Hypotension:  Volume depletion vs. Sepsis. On IV antibiotics.   Surgery following     Chronic Kidney Disease:  Stage 3  - Baseline:  ~ 1.4    Plan:    Ok for PICC  IV fluids - bolus plasmalyte and then continue at 150 cc/hr  Monitor in/outs  Follow labs   Monitor respiratory status and hemodynamics   Replace K  Urine indices to assess tubular function     Critical care time = 40 minutes     Thank you for asking us to participate in the management of your patient, please do not hesitate to contact me for any concerns regarding my recommendations as outlined above.    -----------------------------  Amy Sevilla M.D.   [facility]

## 2021-01-02 NOTE — PROGRESS NOTES
[facility] GENERAL SURGERY    Surgery Progress Note           POD # 6    PATIENT NAME: Judson Gutierrez     TODAY'S DATE: 1/2/2021    INTERVAL HISTORY:    Pt with less pain, continued ng output, low dose pressor. OBJECTIVE:   VITALS:  BP (!) 104/45   Pulse 104   Temp 98.1 °F (36.7 °C) (Axillary)   Resp 22   Ht 5' 4\" (1.626 m)   Wt 218 lb 0.6 oz (98.9 kg)   SpO2 92%   BMI 37.43 kg/m²     INTAKE/OUTPUT:    I/O last 3 completed shifts: In: 1325 [I.V.:1079; IV Piggyback:246]  Out: 4940 [Urine:220; Emesis/NG output:4050]  I/O this shift:  In: -   Out: 200 [Emesis/NG output:200]              CONSTITUTIONAL:  awake and alert  ABDOMEN:   hypoactive bowel sounds, soft, distended, less tender   INCISION: clean    Data:  CBC:   Recent Labs     12/31/20  0557 01/01/21  0751 01/02/21  0430   WBC 16.0* 26.9* 19.7*   HGB 10.9* 11.7* 10.2*   HCT 33.9* 36.5 31.4*    384 263     BMP:    Recent Labs     01/01/21  1238 01/01/21  1714 01/02/21  0430    140 146*   K 3.5 3.7 3.2*  3.2*    97* 99   CO2 15* 30 36*   BUN 42* 49* 57*   CREATININE 1.6* 2.3* 2.8*   GLUCOSE 160* 154* 177*     Hepatic:   Recent Labs     01/01/21  1238 01/01/21  1714   AST 29 18   ALT 11 9*   BILITOT 0.4 0.4   ALKPHOS 82 74     Mag:      Recent Labs     01/01/21  1238 01/01/21  1714 01/02/21  0430   MG 1.50* 1.40* 2.10      Phos:     Recent Labs     01/01/21  1238 01/01/21  1714   PHOS 2.4* 2.0*      INR: No results for input(s): INR in the last 72 hours. Radiology Review:  NA    ASSESSMENT AND PLAN:  68 y.o. female status post left colectomy  1. Hemodynamics stable on minimal pressor support  2. Continue IV abx - leukocytosis improved  3. NG to LCWS.   Start TPN  4.  F/u Nephrology input         Electronically signed by Serg Grissom MD

## 2021-01-02 NOTE — PROGRESS NOTES
Pt very sleepy/lethargic, awakens to voice. All MD specialties rounded bedside. Levo up to 8. Pulse pressure variance is large. Attempted a-line insertion in both radials and 4 RNs unable to obtain. C/o abd pain LUQ. Abdomen is soft but in some places firm. Hypoactive bowel sounds. NGT output slowing down. 1 L IV bolus of plasamlyte given. Still poor urine output. TPN started today.

## 2021-01-02 NOTE — PROGRESS NOTES
Hospitalist Progress Note      PCP: Alma Delia Iyer MD    Date of Admission: 12/27/2020    Chief Complaint: Constipation, abdominal pain, n/v    Hospital Course: 29-year-old CF p/w colonic obstruction secondary to transverse colon mass for which she underwent  left colectomy with anastomosis and mobilization of splenic flexure on 12/27/2020 for obstructing transverse colon mass . Developed postoperative ileus with some pneumoperitoneum on 1/1/2021 requiring to be transferred to ICU for further evaluation and management. Had CT abdomen pelvis on 1/1/2021 which showed possible postoperative  surgical changes  but no focal fluid collections suggestive of abscess . General surgery advised NG tube and LIS. Patient also had sepsis of unclear source for which she was empirically started on IV Merrem after obtaining blood cultures. Subjective: Patient currently resting in bed. Noted to be on Levophed gtt. at 2 mics/min . Labs suggestive of worsening renal functions for which nephrology has been consulted. Noted leukocytosis improving. Patient remained afebrile overnight. Continued to be on NG with LIS. Noted critical care consultation.      Medications:  Reviewed    Infusion Medications    PN-Adult Premix 5/20 - Standard Electrolytes - Central Line      electrolyte-A      norepinephrine 2 mcg/min (01/02/21 0806)    bupivacaine 0.5%       Scheduled Medications    fat emulsion  250 mL Intravenous Once per day on Mon Thu    electrolyte-A        potassium chloride  40 mEq Intravenous Once    meropenem  1 g Intravenous Q12H    sodium chloride flush  10 mL Intravenous 2 times per day    pantoprazole  40 mg Intravenous Daily    [Held by provider] metoprolol succinate  25 mg Oral Daily    sodium chloride flush  10 mL Intravenous 2 times per day    heparin (porcine)  5,000 Units Subcutaneous 3 times per day PRN Meds: ondansetron, potassium chloride, magnesium sulfate, sodium chloride flush, HYDROmorphone, sodium chloride flush, polyethylene glycol, acetaminophen **OR** acetaminophen, prochlorperazine      Intake/Output Summary (Last 24 hours) at 1/2/2021 1411  Last data filed at 1/2/2021 0755  Gross per 24 hour   Intake 1325 ml   Output 4370 ml   Net -3045 ml       Physical Exam Performed:  BP (!) 96/50   Pulse 106   Temp 98.7 °F (37.1 °C) (Axillary)   Resp 25   Ht 5' 4\" (1.626 m)   Wt 218 lb 0.6 oz (98.9 kg)   SpO2 93%   BMI 37.43 kg/m²     General appearance: In no  distress, appears stated age and cooperative. Oxygen by nasal cannula 3 L/min . NG to LIS  Respiratory: Shallow breathing, normal respiratory effort. Clear to auscultation, bilaterally without Rales/Wheezes/Rhonchi. Cardiovascular: Regular rate and rhythm with normal S1/S2 without murmurs, rubs or gallops. Abdomen: hypoactive bowel sounds, soft, non-distended, tenderness noted appropriate, onQ ball in place   Musculoskeletal: No clubbing, cyanosis or edema bilaterally.  Full range of motion without deformity. Skin: Skin color, texture, turgor normal.  No rashes or lesions. Neurologic: Lethargic neurovascularly intact without any focal sensory/motor deficits.  Cranial nerves: II-XII intact, grossly non-focal.  Psychiatric: Alert and oriented, intermittently getting confused        Labs:   Recent Labs     12/31/20  0557 01/01/21  0751 01/02/21  0430   WBC 16.0* 26.9* 19.7*   HGB 10.9* 11.7* 10.2*   HCT 33.9* 36.5 31.4*    384 263     Recent Labs     01/01/21  1238 01/01/21  1714 01/02/21  0430    140 146*   K 3.5 3.7 3.2*  3.2*    97* 99   CO2 15* 30 36*   BUN 42* 49* 57*   CREATININE 1.6* 2.3* 2.8*   CALCIUM 7.9* 8.5 7.9*   PHOS 2.4* 2.0*  --      Urinalysis:    Lab Results   Component Value Date    NITRU Negative 12/27/2020    BLOODU Negative 12/27/2020    SPECGRAV 1.020 12/27/2020    GLUCOSEU Negative 12/27/2020 Radiology:  XR CHEST PORTABLE   Final Result   Right PICC tip near the superior atrial caval junction, in good position. Enteric tube tip likely in the body of the stomach. Intraperitoneal free air seen on comparison chest radiograph is not well   evaluated. Low lung volume with bibasilar subsegmental atelectasis. IR PICC WO SQ PORT/PUMP > 5 YEARS   Final Result   Successful placement of PICC line. CT ABDOMEN PELVIS WO CONTRAST Additional Contrast? None   Final Result   Postsurgical changes from partial colon resection. Small amount of free   fluid within the pelvis. No defined pelvic or abdominal collection. Small amount of free air within the abdomen. Findings may be postsurgical   given recent procedure. Dilated loops of small bowel without a discrete transition point noted to   suggest high-grade obstruction findings may represent ileus. Very small bilateral pleural effusions and atelectasis in the lower lobes. XR CHEST PORTABLE   Final Result   Pneumoperitoneum is now appreciated. This is favored to be postoperative,   noting laparotomy clips on 12/30/2020. Clinical correlation is recommended. Bibasilar atelectasis. XR ACUTE ABD SERIES CHEST 1 VW   Final Result   Minimal left basilar atelectasis. Interval postsurgical changes noted over   the mid abdomen with prominent appearing bowel loops likely indicative of   ileus versus developing partial small bowel obstruction. No   pneumoperitoneum. Recommend comparison with clinical exam and short-term   interval follow-up study. Interval removal of the feeding tube. XR ABDOMEN (KUB) (SINGLE AP VIEW)   Final Result   Gastric tube in the stomach         CT ABDOMEN PELVIS WO CONTRAST Additional Contrast? None   Final Result   1. 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    There is at least partial bowel obstruction at the level of the lesion with proximal colonic and small bowel distension. The lesion is best visualized   on series 601, image 38.   2. No other acute findings within the abdomen or pelvis. 3. Status post cholecystectomy and hysterectomy. Active Hospital Problems    Diagnosis Date Noted    Septic shock ([address] [A41.9, R65.21] 01/02/2021    SIRS (systemic inflammatory response syndrome) (HCC) [R65.10] 01/01/2021    Bandemia [D72.825] 01/01/2021    Acute respiratory distress [R06.03] 01/01/2021    Lactic acidosis [E87.2] 01/01/2021    Pneumoperitoneum [K66.8] 01/01/2021    Acute-on-chronic kidney injury ([address] [N17.9, N18.9] 01/01/2021    Atelectasis [J98.11] 01/01/2021    Small bowel obstruction ([address] [K56.609] 12/27/2020    Class 2 obesity in adult [E66.9] 02/05/2018     Assessment/Plan:  Invasive colonic adenocarcinoma  complicated by large bowel obstruction POA  - surgery consulted - S/p left colectomy with anastomosis and mobilization of splenic flexure on 12/27/2020 for obstructing transverse colon mass on 12/28/20 . POD # 5 .   - NG tube d/consuelo on 12/29/20. General surgery started on clear liquid diet . Patient developed postoperative ileus on 12/31/2020 . Placed back n.p.o. follow-up x-ray on 1/1/2021 suggestive of mild pneumoperitoneum.   - CT abdomen pelvis w/o contrast on 1/1/'21  -showed mild pneumoperitoneum likely related to post surgical changes but no focal fluid collection suggestive of abscess. General surgery following and placed back and NG-tube on 1/1/2021 and connected to LIS   -Continue pain control.  -Oncology Consulted - Added MSI (micro satellite instability) screening and recommended outpatient follow-up  -     Septic shock  -noted on 1/1/2021. Exact etiology unclear -likely GI/ source in the postoperative setting .   Requiring low-dose pressor support.  -As evidenced by significant leukocytosis 29K, elevated lactic 2.5; hypotension, tachycardia -Received fluid bolus and transferred patient to ICU for further closer monitoring on 1/1/2021  -Critical care consulted -started on low-dose IV Levophed gtt.  -blood cultures x2 on 1/1/2021 pending, empirically started on IV Merrem.  CHELSEA due to dehydration from above, in the setting of CKD3  (Baseline Cr is perhaps 1.4.)  -initially improved and now worsening . Likely due to ischemic ATN  -Admission creatinine 2.6;  Improved with  IVFs to 1.2  and started worsening again on 1/1/2021 . Creatinine 2.8 today . Nephrology consulted to assist with management     Hypovolemic hyponatremia  POA - Na 128 on admission - Resolved   -Currently hypernatremic. Continue IV hydration and monitor.     HTN  - held HCTZ while on IVFs. - continued bisoprolol (metoprolol here) and amlodipine ; Held again on 1/1/2021 given hypotension      HLD  - statin    DVT Prophylaxis: Lovenox   Diet: Diet NPO Effective Now Exceptions are: Ice Chips, Sips with Meds  PN-Adult Premix 5/20 - Standard Electrolytes - Central Line  Code Status: Full Code    PT/OT Eval Status: evaluated on 12/29/2020 with recommendations to home with home care    Dispo -likely  4- 5  days pending postop course, p.o. tolerance and clearance by general surgery. The note was completed using Dragon -speech recognition software & EMR  . Every effort was made to ensure accuracy; however, inadvertent computerized transcription errors may be present.     Estell Krabbe, MD

## 2021-01-02 NOTE — PROGRESS NOTES
INPATIENT PULMONARY CRITICAL CARE PROGRESS NOTE      Reason for visit    SIRS/hypotension     SUBJECTIVE: Patient continues to be critically ill in the ICU, patient's blood pressure dropped yesterday evening and patient was started on IV pressors which were continuing to maintain hemodynamics, patient has had T-max of 100.2 °F, patient has sinus tachycardia on the monitor, patient was on 3 L of nasal cannula oxygen with saturation of 90% when seen, patient continues to have some shortness of breath, patient continues to have abdominal pain, patient NG tube output overnight was 4 L overnight, patient has low urine output, patient has cumulative fluid balance of 1.5 L; patient's blood sugars are slightly on higher side ;no other pertinent ROS of concern     Physical Exam:  Blood pressure (!) 96/50, pulse 106, temperature 98.7 °F (37.1 °C), temperature source Axillary, resp. rate 25, height 5' 4\" (1.626 m), weight 218 lb 0.6 oz (98.9 kg), SpO2 93 %.'     Constitutional: In respite distress when seen   HENT:  Oropharynx is clear and moist. No thyromegaly. Eyes:  Conjunctivae are normal. Pupils equal, round, and reactive to light. No scleral icterus. Neck: . No tracheal deviation present. No obvious thyroid mass. Short enlarged neck  Cardiovascular: Normal rate, regular rhythm, normal heart sounds. No right ventricular heave. No lower extremity edema. Pulmonary/Chest: No wheezes. Scattered rales. Chest wall is not dull to percussion. Distinct accessory muscle usage, no stridor. Decreased breath sound intensity at the bases   Abdominal: Distended, surgical staples present, patient has significant diffuse tenderness with questionable rebound tenderness  Musculoskeletal: No cyanosis. No clubbing. No obvious joint deformity. Lymphadenopathy: No cervical or supraclavicular adenopathy. Skin: Skin is warm and dry. No rash or nodules on the exposed extremities. Psychiatric: Normal mood and affect. Behavior is normal.  Slight anxiety. Neurologic: Alert, awake and oriented. PERRL. Speech fluent         Results:  CBC:   Recent Labs     12/31/20  0557 01/01/21  0751 01/02/21  0430   WBC 16.0* 26.9* 19.7*   HGB 10.9* 11.7* 10.2*   HCT 33.9* 36.5 31.4*   MCV 83.8 83.5 83.7    384 263     BMP:   Recent Labs     01/01/21  1238 01/01/21  1714 01/02/21  0430    140 146*   K 3.5 3.7 3.2*  3.2*    97* 99   CO2 15* 30 36*   PHOS 2.4* 2.0*  --    BUN 42* 49* 57*   CREATININE 1.6* 2.3* 2.8*     LIVER PROFILE:   Recent Labs     01/01/21  1238 01/01/21  1714   AST 29 18   ALT 11 9*   LIPASE 44.0 27.0   BILIDIR <0.2 <0.2   BILITOT 0.4 0.4   ALKPHOS 82 74       Imaging:  I have reviewed radiology images personally. XR CHEST PORTABLE   Final Result   Right PICC tip near the superior atrial caval junction, in good position. Enteric tube tip likely in the body of the stomach. Intraperitoneal free air seen on comparison chest radiograph is not well   evaluated. Low lung volume with bibasilar subsegmental atelectasis. IR PICC WO SQ PORT/PUMP > 5 YEARS   Final Result   Successful placement of PICC line. CT ABDOMEN PELVIS WO CONTRAST Additional Contrast? None   Final Result   Postsurgical changes from partial colon resection. Small amount of free   fluid within the pelvis. No defined pelvic or abdominal collection. Small amount of free air within the abdomen. Findings may be postsurgical   given recent procedure. Dilated loops of small bowel without a discrete transition point noted to   suggest high-grade obstruction findings may represent ileus. Very small bilateral pleural effusions and atelectasis in the lower lobes. XR CHEST PORTABLE   Final Result   Pneumoperitoneum is now appreciated. This is favored to be postoperative,   noting laparotomy clips on 12/30/2020. Clinical correlation is recommended. EXAMINATION: CT OF THE ABDOMEN AND PELVIS WITHOUT CONTRAST 1/1/2021 1:43 pm TECHNIQUE: CT of the abdomen and pelvis was performed without the administration of intravenous contrast. Multiplanar reformatted images are provided for review. Dose modulation, iterative reconstruction, and/or weight based adjustment of the mA/kV was utilized to reduce the radiation dose to as low as reasonably achievable. COMPARISON: December 27, 2020 HISTORY: ORDERING SYSTEM PROVIDED HISTORY: postop day five left colectomy; leukocytosis and pain TECHNOLOGIST PROVIDED HISTORY: Reason for exam:->postop day five left colectomy; leukocytosis and pain Additional Contrast?->None Reason for Exam: postop day five left colectomy; leukocytosis and pain Acuity: Acute Type of Exam: Initial FINDINGS: Lower Chest: Small pleural effusions bilaterally, new. Dependent atelectasis. Organs: Cholecystectomy. Liver, pancreas, and spleen are unremarkable. GI/Bowel: NG tube with tip terminating in the stomach. Sigmoid diverticulosis. No findings to suggest acute diverticulitis. Postsurgical changes from partial colon resection. Surgical anastomosis in the left lower abdomen. Dilated loops of small bowel measuring up to 4.4 cm in the mid and upper abdomen. No abrupt transition point or other findings to suggest high-grade obstruction findings may represent ileus. Pelvis: Bladder is decompressed. Jj catheter in the bladder. Mild soft tissue edema. Small amount of free fluid in the pelvis. Peritoneum/Retroperitoneum: No abdominal aortic aneurysm. Adrenal glands are unremarkable. Kidneys are unremarkable. Small amount of free air within the abdomen. Bones/Soft Tissues: No acute bony abnormality. Postsurgical changes from partial colon resection. Small amount of free fluid within the pelvis. No defined pelvic or abdominal collection. Small amount of free air within the abdomen. Findings may be postsurgical given recent procedure. Dilated loops of small bowel without a discrete transition point noted to suggest high-grade obstruction findings may represent ileus. Very small bilateral pleural effusions and atelectasis in the lower lobes.      Ct Abdomen Pelvis Wo Contrast Additional Contrast? None    Result Date: 12/28/2020 EXAMINATION: CT OF THE ABDOMEN AND PELVIS WITHOUT CONTRAST 12/27/2020 5:42 pm TECHNIQUE: CT of the abdomen and pelvis was performed without the administration of intravenous contrast. Multiplanar reformatted images are provided for review. Dose modulation, iterative reconstruction, and/or weight based adjustment of the mA/kV was utilized to reduce the radiation dose to as low as reasonably achievable. COMPARISON: None. HISTORY: ORDERING SYSTEM PROVIDED HISTORY: lower abd pain with constipation TECHNOLOGIST PROVIDED HISTORY: Reason for exam:->lower abd pain with constipation Additional Contrast?->None Reason for Exam: patient complains of stomach pain since last tuesday, no bowel movement since last monday, patient also vomits when she hiccups Acuity: Acute Type of Exam: Initial Relevant Medical/Surgical History: hx of CKD FINDINGS: Lower Chest: Mild dependent lower lobe opacification, likely atelectasis. Organs: The gallbladder is surgically absent. No significant biliary dilatation. Scattered granulomata are noted in the liver and spleen. The spleen is not enlarged. The pancreas is atrophic. The adrenal glands are unremarkable. There is no evidence of obstructive uropathy. Mild symmetric bilateral renal atrophy. GI/Bowel: Annular lesion in the mid transverse colon concerning for a colonic neoplasm. This is best seen on series 601, image 38. There is resultant partial colonic obstruction. Moderate distention of the colon proximal to the lesion with multiple distended fluid-filled small bowel loops. No perienteric inflammatory changes. Maximum cecal diameter 8.9 cm. Distal to the lesion, there is colonic diverticulosis with no acute features. Pelvis: No pelvic mass or free pelvic fluid. The uterus is absent. Mild distention of the urinary bladder. Peritoneum/Retroperitoneum: The abdominal aorta is normal in caliber with mild atherosclerotic plaque.   No retroperitoneal adenopathy or upper abdominal ascites. Bones/Soft Tissues: No acute osseous or soft tissue abnormality. Ankylosis at L5-S1.     1. 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 There is at least partial bowel obstruction at the level of the lesion with proximal colonic and small bowel distension. The lesion is best visualized on series 601, image 38. 2. No other acute findings within the abdomen or pelvis. 3. Status post cholecystectomy and hysterectomy. Xr Abdomen (kub) (single Ap View)    Result Date: 12/27/2020  EXAMINATION: ONE SUPINE XRAY VIEW(S) OF THE ABDOMEN 12/27/2020 7:57 pm COMPARISON: CT abdomen and pelvis same day HISTORY: ORDERING SYSTEM PROVIDED HISTORY: NGT placement verification TECHNOLOGIST PROVIDED HISTORY: Reason for exam:->NGT placement verification Reason for Exam: NGT placement verification Acuity: Acute Type of Exam: Initial FINDINGS: There is a gastric tube with the tip and side-port in the stomach. Distended bowel loops are demonstrated in the upper abdomen. Atelectasis present in the lung bases.   No free air     Gastric tube in the stomach     Xr Acute Abd Series Chest 1 Vw    Result Date: 12/31/2020 EXAMINATION: TWO XRAY VIEWS OF THE ABDOMEN AND SINGLE  XRAY VIEW OF THE CHEST 12/31/2020 10:53 am COMPARISON: Previous abdominal x-ray study dated December 27, 2020 and previous CT report dated December 27, 2020. HISTORY: ORDERING SYSTEM PROVIDED HISTORY: emesis post op. TECHNOLOGIST PROVIDED HISTORY: Reason for exam:->emesis post op. Reason for Exam: emesis post op. Acuity: Acute Type of Exam: Initial FINDINGS: Lungs demonstrate linear left lower lobe atelectasis. The cardiac silhouette is unremarkable. Left costophrenic angle is blunted. There is no pneumothorax. The previously seen feeding tube has been removed. Air is seen in the stomach. Postsurgical staples project over the mid abdomen. Prominent appearing small bowel loops are seen in the mid abdomen. There is some air seen in the distal large bowel loops. Degenerative changes project over the hip joint spaces bilaterally. There is no biliary air. There is no gross pneumoperitoneum. Minimal left basilar atelectasis. Interval postsurgical changes noted over the mid abdomen with prominent appearing bowel loops likely indicative of ileus versus developing partial small bowel obstruction. No pneumoperitoneum. Recommend comparison with clinical exam and short-term interval follow-up study. Interval removal of the feeding tube.      Xr Chest Portable    Result Date: 1/1/2021 EXAMINATION: ONE XRAY VIEW OF THE CHEST 1/1/2021 5:08 pm COMPARISON: Earlier same day. HISTORY: ORDERING SYSTEM PROVIDED HISTORY: PICC TECHNOLOGIST PROVIDED HISTORY: Reason for exam:->PICC Reason for Exam: picc line placement Acuity: Acute Type of Exam: Subsequent/Follow-up FINDINGS: Frontal portable view of the chest.  New right PICC with tip near the superior atrial caval junction. Enteric tube tip overlies the left upper quadrant, likely in the body of the stomach. Low lung volume. Bibasilar subsegmental atelectasis. No new consolidation. No pleural effusion or pneumothorax. Stable cardiomediastinal silhouette and great vessels. Previously noted intraperitoneal free air is not well evaluated. Multilevel degenerative disc disease. Degenerative changes in the bilateral shoulders. Right PICC tip near the superior atrial caval junction, in good position. Enteric tube tip likely in the body of the stomach. Intraperitoneal free air seen on comparison chest radiograph is not well evaluated. Low lung volume with bibasilar subsegmental atelectasis. Xr Chest Portable    Result Date: 1/1/2021  EXAMINATION: ONE XRAY VIEW OF THE CHEST 1/1/2021 10:10 am COMPARISON: 12/31/2020, chest x-ray and abdominal x-ray HISTORY: ORDERING SYSTEM PROVIDED HISTORY: sob TECHNOLOGIST PROVIDED HISTORY: Reason for exam:->sob FINDINGS: Cardiac silhouette mediastinal contours are normal.  There is minimal linear/patchy opacity in the lung bases. Pneumoperitoneum is present, along the right hemidiaphragm, small amount as compared with other studies. Pneumoperitoneum is now appreciated. This is favored to be postoperative, noting laparotomy clips on 12/30/2020. Clinical correlation is recommended. Bibasilar atelectasis.      Ir Picc Wo Sq Port/pump > 5 Years    Result Date: 1/1/2021 EXAMINATION: LIMITED ULTRASOUND OF THE ARM FOR PICC ACCESS, 1/1/2021 TECHNIQUE: The PICC team used ultrasound and VPS guidance to place a PICC line. HISTORY: ORDERING SYSTEM PROVIDED HISTORY: limited access TECHNOLOGIST PROVIDED HISTORY: Reason for exam:->limited access How many lumens are being requested?->3 What site is the preferred site? ->No preference What side should this line be placed? ->Either FINDINGS: Ultrasound images demonstrate patency of the right basilic vein which was used for access for placement of a PICC by the PICC team, without a radiologist present. VPS guidance was used by the PICC team By report, a right upper extremity PICC was placed. Successful placement of PICC line. Results for Florentino Durbin (MRN [id number]) as of 1/2/2021 12:36   Ref. Range 1/1/2021 17:14 1/1/2021 18:20 1/2/2021 04:30 1/2/2021 04:30   Sodium Latest Ref Range: 136 - 145 mmol/L 140   146 (H)   Potassium Latest Ref Range: 3.5 - 5.1 mmol/L 3.7  3.2 (L) 3.2 (L)   Chloride Latest Ref Range: 99 - 110 mmol/L 97 (L)   99   CO2 Latest Ref Range: 21 - 32 mmol/L 30   36 (H)   BUN Latest Ref Range: 7 - 20 mg/dL 49 (H)   57 (H)   Creatinine Latest Ref Range: 0.6 - 1.2 mg/dL 2.3 (H)   2.8 (H)   Anion Gap Latest Ref Range: 3 - 16  13   11   GFR Non- Latest Ref Range: >60  20 (A)   16 (A)   GFR  Latest Ref Range: >60  25 (A)   20 (A)   Magnesium Latest Ref Range: 1.80 - 2.40 mg/dL 1.40 (L)  2.10    Lactic Acid Latest Ref Range: 0.4 - 2.0 mmol/L  1.3     Glucose Latest Ref Range: 70 - 99 mg/dL 154 (H)   177 (H)   Calcium Latest Ref Range: 8.3 - 10.6 mg/dL 8.5   7.9 (L)   Phosphorus Latest Ref Range: 2.5 - 4.9 mg/dL 2.0 (L)      Total Protein Latest Ref Range: 6.4 - 8.2 g/dL 5.4 (L)        Results for Florentino Durbin (MRN [id number]) as of 1/2/2021 12:36   Ref.  Range 12/30/2020 05:47 12/31/2020 05:57 1/1/2021 07:51 1/2/2021 04:30

## 2021-01-03 PROBLEM — R73.9 HYPERGLYCEMIA: Status: ACTIVE | Noted: 2021-01-01

## 2021-01-03 PROBLEM — J95.2 ACUTE POSTOPERATIVE PULMONARY INSUFFICIENCY (HCC): Status: ACTIVE | Noted: 2021-01-01

## 2021-01-03 PROBLEM — Z90.49 S/P PARTIAL COLECTOMY: Status: ACTIVE | Noted: 2021-01-01

## 2021-01-03 NOTE — OP NOTE
Date of Surgery: 1/3/21    Preop Dx:  Possible Anastomotic Leak    Postop Dx:  Same    Procedure:  Partial Colectomy and Colostomy    Surgeon:  Sae Wheeler    Assistant:      Anesthesia:  GETA    EBL:   50ml    Specimen:  Transverse colon    Complications: None    Drains/Lines:  19F DAVID    Indications:  67 yo with pneumoperitoneum on imaging and worsened hemodynamics. Description:       Patient and daughter were given adequate description of the risks and rewards of the procedure, including bleeding, infection, injury to surrounding structures, need for further operations and freely consented. She was given appropriate antibiotics and brought to the OR where GETA anesthesia was induced. She was placed in supine position. Prepped and draped in usual sterile fashion. Prior staples and fascial sutures removed. Upon entering the peritoneum there was a mild amount of feculent ascites that was suctioned free. The adhesions of omentum and small intestine over the colon were taken down bluntly to expose the anastomosis. There anterior third of the anastomosis was . There did not appear to be any significant ischemia involving the two limbs of colon. A mesenteric window was created distal to this anastomosis and blue loaded CHARLENE stapler used to transect the colon. Proximal to the anastomosis the same maneuver was done. The mesentery between was controlled with the ligasure device and the transverse colon sent to pathology. The area was then copiously irrigated. A 19F channel drain was inserted through a stab incision in the left abdomen and positioned in this area. It was secured to the skin with silk suture. The skin at the colostomy site in the right abdomen. was grasped with a kocher and a quarter sized incision made. This was carried down to rectus fascia using electrocautery and a cruciate incision made in the fascia with electrocautery. The rectus was split using retractors and peritoneum incised with electrocautery. This allowed for passage of two fingers. The colon was then grasped with a moe clamp and brought through the abdominal wall in a tension free, nontwisting manner. The midline incision was then closed at the fascial level with looped 0-0 PDS suture x 2. Staples were used to close the epidermis. This wound was covered with a sterile towel. The descending colon then had its staple line removed with electrocautery. The colostomy was then created in a brooked fashion using interrupted 3-0 vicryl suture. An ostomy device was then placed. Sterile dressing placed. All suture, sponge and instrument count correct times two at end of case. Transferred to ICU in critical condition.     Eleno Thomas MD

## 2021-01-03 NOTE — ANESTHESIA POSTPROCEDURE EVALUATION
Department of Anesthesiology  Postprocedure Note    Patient: Sandip Braswell  MRN: [id number]  YOB: 1940  Date of evaluation: 1/3/2021  Time:  3:24 PM     Procedure Summary     Date: 01/03/21 Room / Location: [place] [address] / [facility]    Anesthesia Start: 0632 Anesthesia Stop: 2906    Procedure: LAPAROTOMY EXPLORATORY; PARTIAL COLECTOMY WITH COLOSTOMY PLACEMENT (N/A Abdomen) Diagnosis: (POSSIBLE BOWEL PERFERATION)    Surgeons: Luba Collins MD Responsible Provider: Marion Irvin MD    Anesthesia Type: general ASA Status: 4 - Emergent          Anesthesia Type: general    Rosette Phase I: Rosette Score: 9    Rosette Phase II:      Last vitals: Reviewed and per EMR flowsheets.        Anesthesia Post Evaluation    Comments: Postoperative Anesthesia Note    Name:    Sandip Braswell  MRN:      [id number]    Patient Vitals in the past 12 hrs:  01/03/21 1333, BP:(!) 164/94, Pulse:103, Resp:15, SpO2:96 %  01/03/21 1320, Pulse:131, Resp:(!) 43  01/03/21 1319, Pulse:117, Resp:21 01/03/21 1317, Pulse:107, Resp:20  01/03/21 1316, Pulse:110, Resp:23 01/03/21 1315, Pulse:108, Resp:21 01/03/21 1314, Pulse:102, Resp:28  01/03/21 1313, Pulse:95, Resp:22, SpO2:100 %  01/03/21 0800, BP:(!) 112/56, Temp:98.9 °F (37.2 °C), Temp src:Axillary  01/03/21 0700, BP:(!) 97/57, Pulse:99, Resp:23, SpO2:94 %  01/03/21 0600, BP:(!) 110/52, Pulse:100, Resp:21, SpO2:93 %, Weight:227 lb 15.3 oz (103.4 kg)  01/03/21 0500, BP:102/60, Pulse:100, Resp:19, SpO2:94 %  01/03/21 0400, BP:94/64, Temp:98.7 °F (37.1 °C), Temp src:Axillary, Pulse:101, Resp:26, SpO2:91 %     LABS:    CBC  Lab Results       Component                Value               Date/Time                  WBC                      18.6 (H)            01/03/2021 05:38 AM        HGB                      10.6 (L)            01/03/2021 01:37 PM        HCT                      30.8 (L)            01/03/2021 05:38 AM PLT                      305                 01/03/2021 05:38 AM   RENAL  Lab Results       Component                Value               Date/Time                  NA                       143                 01/03/2021 05:38 AM        K                        3.7                 01/03/2021 05:38 AM        K                        3.7                 01/03/2021 05:38 AM        CL                       92 (L)              01/03/2021 05:38 AM        CO2                      39 (H)              01/03/2021 05:38 AM        BUN                      79 (H)              01/03/2021 05:38 AM        CREATININE               3.1 (H)             01/03/2021 05:38 AM        GLUCOSE                  278 (H)             01/03/2021 05:38 AM   COAGS  No results found for: PROTIME, INR, APTT    Intake & Output: In: 5367 (I.V.:3081)  Out: 645 (Urine:425; Drains:170)    Nausea & Vomiting:  N/A    Level of Consciousness:  Deep sedation    Pain Assessment:  N/A    Anesthesia Complications:  No apparent anesthetic complications    SUMMARY:  Severe septic shock requiring escalating pressor support. Taken to ICU ventilated w/ norepi @ 50 mcg/min. Vital signs stable  OK to discharge from Stage I post anesthesia care.   Care transferred from Anesthesiology department on discharge from perioperative area

## 2021-01-03 NOTE — PROGRESS NOTES
01/03/21 1556   Vent Information   Skin Assessment Clean, dry, & intact   Rate Set 16 bmp   Pressure Support 0 cmH20   FiO2  100 %   SpO2 100 %   SpO2/FiO2 ratio 100   Sensitivity 3   PEEP/CPAP 10   I Time/ I Time % 1 s   Humidification Source HME   Vent Patient Data   High Peep/I Pressure 25   Peak Inspiratory Pressure 35 cmH2O   Mean Airway Pressure 17 cmH20   Rate Measured 16 br/min   Vt Exhaled 583 mL   Minute Volume 9.31 Liters   I:E Ratio 1:2.40   Spontaneous Breathing Trial (SBT) RT Doc   Pulse 94   Breath Sounds   Right Upper Lobe Diminished   Right Middle Lobe Diminished   Right Lower Lobe Diminished   Left Upper Lobe Diminished   Left Lower Lobe Diminished   Additional Respiratory  Assessments   Resp 16   Position Semi-Otero's   Alarm Settings   High Pressure Alarm 40 cmH2O   Low Minute Volume Alarm 3 L/min   High Respiratory Rate 40 br/min   Patient Observation   Observations ambu @ bedside   [REMOVED] ETT (adult)   Removal Date/Time: 01/03/21 1405  Placement Date/Time: 01/03/21 1149   Preoxygenation: Yes  Mask Ventilation: Mask ventilation not attempted (0)  Technique: Direct laryngoscopy;Rapid sequence;Cricoid pressure;Stylet  Type: Cuffed  Tube Size: 7 mm  Lar. ..    Secured at 22 cm   Measured From [address] [place] [address],[address] By Commercial tube weber   Site Condition Dry

## 2021-01-03 NOTE — ANESTHESIA PROCEDURE NOTES
Arterial Line:    An arterial line was placed using ultrasound guidance, in the pre-op for the following indication(s): continuous blood pressure monitoring, blood sampling needed and Septic shock. A 20 gauge (size), 1 and 3/4 inch (length), Arrow (type) catheter was placed, Seldinger technique used, into the right radial artery, secured by tape and Tegaderm. Anesthesia type: Local    Events:  patient tolerated procedure well with no complications and EBL 0mL.   1/3/2021 11:32 AM1/3/2021 11:37 AM  Anesthesiologist: Elaine Kaminski MD  Performed: Anesthesiologist   Preanesthetic Checklist  Completed: patient identified, IV checked, site marked, risks and benefits discussed, surgical consent, monitors and equipment checked, pre-op evaluation, timeout performed, anesthesia consent given, oxygen available and patient being monitored

## 2021-01-03 NOTE — PROGRESS NOTES
This note also relates to the following rows which could not be included:  SpO2 - Cannot attach notes to unvalidated device data       01/03/21 1320   Vent Information   Vt Ordered 500 mL   Rate Set 16 bmp   Pressure Support 0 cmH20   FiO2  100 %   Sensitivity 3   PEEP/CPAP 5   I Time/ I Time % 0 s   Humidification Source HME   Vent Patient Data   Peak Inspiratory Pressure 33 cmH2O   Mean Airway Pressure 11 cmH20   Rate Measured 16 br/min   Vt Exhaled 472 mL   Minute Volume 7.67 Liters   I:E Ratio 1:2.90   Spontaneous Breathing Trial (SBT) RT Doc   Pulse 131   Breath Sounds   Right Upper Lobe Diminished   Right Middle Lobe Diminished   Right Lower Lobe Diminished   Left Upper Lobe Diminished   Left Lower Lobe Diminished   Additional Respiratory  Assessments   Resp (!) 43   Position Semi-Otero's   Cuff Pressure (cm H2O) 30 cm H2O   Alarm Settings   High Pressure Alarm 40 cmH2O   Low Minute Volume Alarm 3 L/min   High Respiratory Rate 40 br/min   Patient Observation   Observations ambu @ bedside   ETT (adult)   Placement Date/Time: 01/03/21 1149   Preoxygenation: Yes  Mask Ventilation: Mask ventilation not attempted (0)  Technique: Direct laryngoscopy;Rapid sequence;Cricoid pressure;Stylet  Type: Cuffed  Tube Size: 7 mm  Laryngoscope: Mac  Blade Size: 3  Loc. ..    Secured at 22 cm   Measured From [address] [place] [address],[address] By [employer] tube weber   Site Condition Dry

## 2021-01-03 NOTE — PROGRESS NOTES
Dr Mary Bernardo on call or nephrology.  Notified of vascath being very positional. CRRT running at this time and continues to be very positional.

## 2021-01-03 NOTE — PROGRESS NOTES
[facility] GENERAL SURGERY    Surgery Progress Note           POD # 7    PATIENT NAME: Crescencio Bashir     TODAY'S DATE: 1/3/2021    INTERVAL HISTORY:    Pt with left upper abdominal pain, some increase in pressor requirement. OBJECTIVE:   VITALS:  BP (!) 97/57   Pulse 99   Temp 98.7 °F (37.1 °C) (Axillary)   Resp 23   Ht 5' 4\" (1.626 m)   Wt 227 lb 15.3 oz (103.4 kg)   SpO2 94%   BMI 39.13 kg/m²     INTAKE/OUTPUT:    I/O last 3 completed shifts: In: 5075 [I.V.:4307]  Out: 0 [Urine:205; Emesis/NG output:600]  No intake/output data recorded. CONSTITUTIONAL:  awake and alert  ABDOMEN:   hypoactive bowel sounds, soft, mild distention, tender upper abdomen   INCISION: clean, dry    Data:  CBC:   Recent Labs     01/01/21  0751 01/02/21 0430 01/03/21  0538   WBC 26.9* 19.7* 18.6*   HGB 11.7* 10.2* 10.1*   HCT 36.5 31.4* 30.8*    263 305     BMP:    Recent Labs     01/01/21  1714 01/02/21 0430 01/02/21  1432 01/03/21  0538    146*  --  143   K 3.7 3.2*  3.2* 4.2 3.7  3.7   CL 97* 99  --  92*   CO2 30 36*  --  39*   BUN 49* 57*  --  79*   CREATININE 2.3* 2.8*  --  3.1*   GLUCOSE 154* 177*  --  278*     Hepatic:   Recent Labs     01/01/21  1238 01/01/21  1714 01/03/21  0538   AST 29 18 15   ALT 11 9* 6*   BILITOT 0.4 0.4 0.3   ALKPHOS 82 74 76     Mag:      Recent Labs     01/01/21 1714 01/02/21  0430 01/03/21  0538   MG 1.40* 2.10 2.80*      Phos:     Recent Labs     01/01/21  1238 01/01/21  1714 01/03/21  0538   PHOS 2.4* 2.0* 3.5      INR: No results for input(s): INR in the last 72 hours. Radiology Review:  NA    ASSESSMENT AND PLAN:  68 y.o. female status post left colectomy  1. With persistent leukocytosis, pain and increase in pressor requirement will get repeat CT. If more conclusive about possible anastomotic leak would then return to OR.   2.  Continue NG, TPN, IV abx         Electronically signed by Selene Wheat MD

## 2021-01-03 NOTE — PROGRESS NOTES
Dr Jose Alberts here to place hanane. JAZMINRT being primed. Pt family updated per Jose Alberts and LUIS.

## 2021-01-03 NOTE — PROGRESS NOTES
Progress Note    HISTORY     CC:   Abdominal pain, constipation             We are following for acute kidney injury         Subjective/   HPI:  Patient in the ICU. Deteriorating kidney function despite 4 liters fluid positive. Hypotensive and taken back to OR. Now on vent and pressors. She is making urine. Metabolic alkalosis and good oxygenation. ROS:  Constitutional:  No fevers,  Respiratory: On vent   :  Jj placed     Social Hx:  Discussed with daughter on the phone    Past Medical and Surgical History:  - Reviewed, no changes     EXAM       Objective/     Vitals:    01/03/21 1317 01/03/21 1319 01/03/21 1320 01/03/21 1333   BP:    (!) 164/94   Pulse: 107 117 131 103   Resp: 20 21 (!) 43 15   Temp:       TempSrc:       SpO2:    96%   Weight:       Height:         24HR INTAKE/OUTPUT:      Intake/Output Summary (Last 24 hours) at 1/3/2021 1527  Last data filed at 1/3/2021 1400  Gross per 24 hour   Intake 5874 ml   Output 685 ml   Net 5189 ml     Constitutional:  Critically ill   Eyes:  Pupils reactive, sclera clear   Neck:  Normal thyroid, no masses   Cardiovascular:  Regular, no rub  Respiratory:   On vent, no wheezing, FiO2 100%  Psychiatry:  Sedated on vent   Abdomen: ostomy, no bowel sounds, soft   Musculoskeletal: No LE edema, no clubbing   Lymphatics:  No LAD in neck, no supraclavicular nodes       MEDICAL DECISION MAKING       Data/  Recent Labs     01/01/21  0751 01/02/21  0430 01/03/21  0538 01/03/21  1337   WBC 26.9* 19.7* 18.6*  --    HGB 11.7* 10.2* 10.1* 10.6*   HCT 36.5 31.4* 30.8*  --    MCV 83.5 83.7 83.7  --     263 305  --      Recent Labs     01/01/21  1238 01/01/21  1714 01/02/21  0430 01/02/21  1432 01/03/21  0538    140 146*  --  143   K 3.5 3.7 3.2*  3.2* 4.2 3.7  3.7    97* 99  --  92*   CO2 15* 30 36*  --  39*   GLUCOSE 160* 154* 177*  --  278*   PHOS 2.4* 2.0*  --   --  3.5   MG 1.50* 1.40* 2.10  --  2.80*   BUN 42* 49* 57*  --  79* CREATININE 1.6* 2.3* 2.8*  --  3.1*   LABGLOM 31* 20* 16*  --  15*   GFRAA 38* 25* 20*  --  18*       Assessment/     Acute Kidney Injury:  KDIGO stage 2  - Etiology:  ATN / ischemic   - Clinical:  Urine output picked up to day, 290 cc this shift which is more than all day yesterday but hypotensive post op and high pressor requirements. But on high pressor requirements       Abdominal Pain:  Found to have     Metabolic Alkalosis:  Due to high NG output and chloride depletion      Hypotension:  Septic Shock. Now on high dose levo. Also on vaso and getting volume.       Chronic Kidney Disease:  Stage 3  - Baseline:  ~ 1.4    Plan/     AM labs stable.   I suspect not stable anymore  She is at very high risk to decompensate overnight  Best to place a line now and start CRRT to control volume and provide renal support     Critical care time = 33 minutes excluding procedure time   -----------------------------  Cj Sánchez M.D.   [facility]

## 2021-01-03 NOTE — PROGRESS NOTES
Hospitalist Progress Note      PCP: Yoli Parra MD    Date of Admission: 12/27/2020    Chief Complaint: Constipation, abdominal pain, n/v    Hospital Course: 57-year-old CF p/w colonic obstruction secondary to transverse colon mass for which she underwent  left colectomy with anastomosis and mobilization of splenic flexure on 12/27/2020 for obstructing transverse colon mass . Developed postoperative ileus with some pneumoperitoneum on 1/1/2021 requiring to be transferred to ICU for further evaluation and management. Had CT abdomen pelvis on 1/1/2021 which showed possible postoperative  surgical changes  but no focal fluid collections suggestive of abscess . General surgery advised NG tube and LIS. Patient also had sepsis of unclear source for which she was empirically started on IV Merrem after obtaining blood cultures. Subjective: Patient patient seen earlier this morning , found to be clinically more worse with increased abdominal pain, lethargy, diaphoresis. Noted General surgery plan to get CT abdomen pelvis for further evaluation. Continue to have pneumoperitoneum. General surgery decided to take patient to the OR . Patient seen postoperatively and found to have anastomosis leak which was repaired. Postoperatively patient became hypotensive and rapid response was called. Critical care at bedside and patient currently requiring 2 pressors (epinephrine and vasopressin) . D/w General Surgery and Critical Care regarding care plan . Patient prognosis is guarded at best . Dr. Austin Denton planning to call daughter and update her regarding patient condition . Labs suggestive of worsening renal functions for which nephrology is following and assisting with care . Noted leukocytosis improving. Patient remained afebrile overnight. Continued to be on NG with LIS.       Medications:  Reviewed    Infusion Medications    PN-Adult Premix 5/20 - Standard Electrolytes - [facility]  vasopressin (Septic Shock) infusion 0.04 Units/min (01/03/21 1425)    fentaNYL (SUBLIMAZE) infusion 25 mcg/hr (01/03/21 1343)    propofol 20 mcg/kg/min (01/03/21 1432)    EPINEPHrine infusion      electrolyte-A      PN-Adult Premix 5/20 - Standard Electrolytes - [facility] 42 mL/hr at 01/02/21 1827    norepinephrine 50 mcg/min (01/03/21 1252)    bupivacaine 0.5%       Scheduled Medications    insulin lispro  0-6 Units Subcutaneous Q4H    propofol        fat emulsion  250 mL Intravenous Once per day on Mon Thu    potassium chloride  40 mEq Intravenous Once    meropenem  1 g Intravenous Q12H    sodium chloride flush  10 mL Intravenous 2 times per day    pantoprazole  40 mg Intravenous Daily    [Held by provider] metoprolol succinate  25 mg Oral Daily    sodium chloride flush  10 mL Intravenous 2 times per day    heparin (porcine)  5,000 Units Subcutaneous 3 times per day     PRN Meds: ondansetron, potassium chloride, magnesium sulfate, sodium chloride flush, HYDROmorphone, sodium chloride flush, polyethylene glycol, acetaminophen **OR** acetaminophen, prochlorperazine      Intake/Output Summary (Last 24 hours) at 1/3/2021 1451  Last data filed at 1/3/2021 1400  Gross per 24 hour   Intake 7696 ml   Output 1115 ml   Net 6581 ml       Physical Exam Performed:  BP (!) 164/94   Pulse 103   Temp 98.9 °F (37.2 °C) (Axillary)   Resp 15   Ht 5' 4\" (1.626 m)   Wt 227 lb 15.3 oz (103.4 kg)   SpO2 96%   BMI 39.13 kg/m²     General appearance: In no  distress, appears stated age and cooperative. Oxygen by nasal cannula 3 L/min . NG to LIS  Respiratory: Shallow breathing, normal respiratory effort. Clear to auscultation, bilaterally without Rales/Wheezes/Rhonchi. Cardiovascular: Regular rate and rhythm with normal S1/S2 without murmurs, rubs or gallops.   Abdomen: hypoactive bowel sounds, soft, non-distended, tenderness noted appropriate, onQ ball in place  Musculoskeletal: No clubbing, cyanosis or edema bilaterally.  Full range of motion without deformity. Skin: Skin color, texture, turgor normal.  No rashes or lesions. Neurologic: Lethargic neurovascularly intact without any focal sensory/motor deficits. Cranial nerves: II-XII intact, grossly non-focal.  Psychiatric: Alert and oriented, intermittently getting confused        Labs:   Recent Labs     01/01/21  0751 01/02/21  0430 01/03/21  0538 01/03/21  1337   WBC 26.9* 19.7* 18.6*  --    HGB 11.7* 10.2* 10.1* 10.6*   HCT 36.5 31.4* 30.8*  --     263 305  --      Recent Labs     01/01/21  1238 01/01/21  1714 01/02/21  0430 01/02/21  1432 01/03/21  0538    140 146*  --  143   K 3.5 3.7 3.2*  3.2* 4.2 3.7  3.7    97* 99  --  92*   CO2 15* 30 36*  --  39*   BUN 42* 49* 57*  --  79*   CREATININE 1.6* 2.3* 2.8*  --  3.1*   CALCIUM 7.9* 8.5 7.9*  --  7.8*   PHOS 2.4* 2.0*  --   --  3.5     Urinalysis:    Lab Results   Component Value Date    NITRU Negative 12/27/2020    BLOODU Negative 12/27/2020    SPECGRAV 1.020 12/27/2020    GLUCOSEU Negative 12/27/2020       Radiology:  CT ABDOMEN PELVIS WO CONTRAST Additional Contrast? None   Final Result   Nasogastric tube is in normal position. Colocolonic anastomosis of the transverse colon is noted. There is no   adjacent fluid collection. Stable small to moderate amount of pneumoperitoneum. Free fluid is slightly increased, in the perihepatic and right lower quadrant   intermittent mesenteric spaces. Free fluid in pelvis is unchanged. The   degree of fluid is difficult to quantitate. No abscess collection is evident. Nonspecific pattern with dilation of small bowel loops in the right lower. Other small bowel loops are less dilated in the interval.      Small bilateral pleural effusions. Increased bibasilar atelectasis. Pneumonia differential concern.          XR CHEST PORTABLE   Final Result Right PICC tip near the superior atrial caval junction, in good position. Enteric tube tip likely in the body of the stomach. Intraperitoneal free air seen on comparison chest radiograph is not well   evaluated. Low lung volume with bibasilar subsegmental atelectasis. IR PICC WO SQ PORT/PUMP > 5 YEARS   Final Result   Successful placement of PICC line. CT ABDOMEN PELVIS WO CONTRAST Additional Contrast? None   Final Result   Postsurgical changes from partial colon resection. Small amount of free   fluid within the pelvis. No defined pelvic or abdominal collection. Small amount of free air within the abdomen. Findings may be postsurgical   given recent procedure. Dilated loops of small bowel without a discrete transition point noted to   suggest high-grade obstruction findings may represent ileus. Very small bilateral pleural effusions and atelectasis in the lower lobes. XR CHEST PORTABLE   Final Result   Pneumoperitoneum is now appreciated. This is favored to be postoperative,   noting laparotomy clips on 12/30/2020. Clinical correlation is recommended. Bibasilar atelectasis. XR ACUTE ABD SERIES CHEST 1 VW   Final Result   Minimal left basilar atelectasis. Interval postsurgical changes noted over   the mid abdomen with prominent appearing bowel loops likely indicative of   ileus versus developing partial small bowel obstruction. No   pneumoperitoneum. Recommend comparison with clinical exam and short-term   interval follow-up study. Interval removal of the feeding tube. XR ABDOMEN (KUB) (SINGLE AP VIEW)   Final Result   Gastric tube in the stomach         CT ABDOMEN PELVIS WO CONTRAST Additional Contrast? None   Final Result   1. 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    There is at least partial bowel obstruction at the level of the lesion with proximal colonic and small bowel distension. The lesion is best visualized   on series 601, image 38.   2. No other acute findings within the abdomen or pelvis. 3. Status post cholecystectomy and hysterectomy. Active Hospital Problems    Diagnosis Date Noted    Abnormal CT of the abdomen [R93.5]     Septic shock ([address] [A41.9, R65.21] 01/02/2021    SIRS (systemic inflammatory response syndrome) (HCC) [R65.10] 01/01/2021    Bandemia [D72.825] 01/01/2021    Acute respiratory distress [R06.03] 01/01/2021    Lactic acidosis [E87.2] 01/01/2021    Pneumoperitoneum [K66.8] 01/01/2021    Acute-on-chronic kidney injury ([address] [N17.9, N18.9] 01/01/2021    Atelectasis [J98.11] 01/01/2021    Small bowel obstruction ([address] [K56.609] 12/27/2020    Class 2 obesity in adult [E66.9] 02/05/2018     Assessment/Plan:  Invasive colonic adenocarcinoma  complicated by large bowel obstruction POA  - surgery consulted - S/p left colectomy with anastomosis and mobilization of splenic flexure   for obstructing transverse colon mass on 12/28/20 .    - NG tube d/consuelo on 12/29/20. General surgery started on clear liquid diet . Patient developed postoperative ileus on 12/31/2020 . Placed back n.p.o. follow-up x-ray on 1/1/2021 suggestive of mild pneumoperitoneum.   - CT abdomen pelvis w/o contrast on 1/1/'21  -showed mild pneumoperitoneum likely related to post surgical changes but no focal fluid collection suggestive of abscess. General surgery following and placed back and NG-tube on 1/1/2021 and connected to LIS ; On 1/3/21 patient had a follow-up CT abdomen pelvis which showed persistent pneumoperitoneum with worsening hemodynamics -  general surgery decided to take her to the OR.   Noted anastomotic leak s/p partial colectomy and colostomy done on 1/3/2021  -Continue pain control.  -Oncology Consulted - Added MSI (micro satellite instability) screening and recommended outpatient follow-up

## 2021-01-03 NOTE — ANESTHESIA PRE PROCEDURE
Department of Anesthesiology  Preprocedure Note       Name:  Sandip Braswell   Age:  68 y.o.  :  1940                                          MRN:  [id number]         Date:  1/3/2021      Surgeon: Veronica Michel):  Luba Collins MD    Procedure: Procedure(s):  LAPAROTOMY EXPLORATORY    Medications prior to admission:   Prior to Admission medications    Medication Sig Start Date End Date Taking?  Authorizing Provider   atorvastatin (LIPITOR) 20 MG tablet Take 20 mg by mouth daily   Yes Historical Provider, MD   spironolactone (ALDACTONE) 25 MG tablet Take 25 mg by mouth daily   Yes Historical Provider, MD   omeprazole (PRILOSEC) 20 MG delayed release capsule Take 20 mg by mouth daily   Yes Historical Provider, MD   hydrochlorothiazide (HYDRODIURIL) 25 MG tablet Take 25 mg by mouth daily   Yes Historical Provider, MD   bisoprolol (ZEBETA) 5 MG tablet Take 2.5 mg by mouth daily   Yes Historical Provider, MD   amLODIPine (NORVASC) 10 MG tablet Take 10 mg by mouth daily   Yes Historical Provider, MD   cyanocobalamin 1000 MCG tablet Take 1,000 mcg by mouth daily   Yes Historical Provider, MD   Multiple Vitamins-Minerals (THERAPEUTIC MULTIVITAMIN-MINERALS) tablet Take 1 tablet by mouth daily   Yes Historical Provider, MD   aspirin 81 MG EC tablet Take 81 mg by mouth daily   Yes Historical Provider, MD   Omega-3 Fatty Acids (FISH OIL) 1000 MG CAPS Take 3,000 mg by mouth 3 times daily   Yes Historical Provider, MD   Calcium Carb-Cholecalciferol (CALCIUM-VITAMIN D) 600-400 MG-UNIT TABS Take 1 tablet by mouth daily   Yes Historical Provider, MD       Current medications:    Current Facility-Administered Medications   Medication Dose Route Frequency Provider Last Rate Last Admin    insulin lispro (HUMALOG) injection vial 0-6 Units  0-6 Units Subcutaneous Q4H Armani Trevizo MD   3 Units at 21 4911  heparin (porcine) injection 5,000 Units  5,000 Units Subcutaneous 3 times per day Arturo Thorne MD   5,000 Units at 01/03/21 0547       Allergies:  No Known Allergies    Problem List:    Patient Active Problem List   Diagnosis Code    Bronchopneumonia J18.0    Hypoxia R09.02    CKD (chronic kidney disease) N18.9    Class 2 obesity in adult E66.9    Small bowel obstruction ([address] K56.609    SIRS (systemic inflammatory response syndrome) (HCC) R65.10    Bandemia D72.825    Acute respiratory distress R06.03    Lactic acidosis E87.2    Pneumoperitoneum K66.8    Acute-on-chronic kidney injury ([address] N17.9, N18.9    Atelectasis J98.11    Septic shock ([address] A41.9, R65.21       Past Medical History:        Diagnosis Date    CKD (chronic kidney disease) stage 3, GFR 30-59 ml/min     Hypertension        Past Surgical History:        Procedure Laterality Date    HEMICOLECTOMY Right 12/28/2020    BOWEL RESECTION EXTENDED RIGHT COLECTOMY performed by Arturo Thorne MD at [place] [address] History:    Social History     Tobacco Use    Smoking status: Never Smoker    Smokeless tobacco: Never Used   Substance Use Topics    Alcohol use:  No                                Counseling given: Not Answered      Vital Signs (Current):   Vitals:    01/03/21 0500 01/03/21 0600 01/03/21 0700 01/03/21 0800   BP: 102/60 (!) 110/52 (!) 97/57 (!) 112/56   Pulse: 100 100 99    Resp: 19 21 23    Temp:    98.9 °F (37.2 °C)   TempSrc:    Axillary   SpO2: 94% 93% 94%    Weight:  227 lb 15.3 oz (103.4 kg)     Height:                                                  BP Readings from Last 3 Encounters:   01/03/21 (!) 112/56   12/28/20 (!) 184/87   02/08/18 (!) 148/84       NPO Status: Time of last liquid consumption: 2100                        Time of last solid consumption: 1200                        Date of last liquid consumption: 12/27/20                        Date of last solid food consumption: 12/26/20    BMI: Wt Readings from Last 3 Encounters:   01/03/21 227 lb 15.3 oz (103.4 kg)   02/08/18 209 lb 6.4 oz (95 kg)     Body mass index is 39.13 kg/m².     CBC:   Lab Results   Component Value Date    WBC 18.6 01/03/2021    RBC 3.68 01/03/2021    HGB 10.1 01/03/2021    HCT 30.8 01/03/2021    MCV 83.7 01/03/2021    RDW 14.9 01/03/2021     01/03/2021       CMP:   Lab Results   Component Value Date     01/03/2021    K 3.7 01/03/2021    K 3.7 01/03/2021    CL 92 01/03/2021    CO2 39 01/03/2021    BUN 79 01/03/2021    CREATININE 3.1 01/03/2021    GFRAA 18 01/03/2021    AGRATIO 0.8 01/03/2021    LABGLOM 15 01/03/2021    GLUCOSE 278 01/03/2021    PROT 4.8 01/03/2021    CALCIUM 7.8 01/03/2021    BILITOT 0.3 01/03/2021    ALKPHOS 76 01/03/2021    AST 15 01/03/2021    ALT 6 01/03/2021       POC Tests:   Recent Labs     01/03/21  0521   POCGLU 283*       Coags: No results found for: PROTIME, INR, APTT    HCG (If Applicable): No results found for: PREGTESTUR, PREGSERUM, HCG, HCGQUANT     ABGs: No results found for: PHART, PO2ART, YRL0ADQ, KZU6XJT, BEART, [id number]     Type & Screen (If Applicable):  No results found for: LABABO, LABRH    Drug/Infectious Status (If Applicable):  No results found for: HIV, HEPCAB    COVID-19 Screening (If Applicable):   Lab Results   Component Value Date    COVID19 Not Detected 12/28/2020         Anesthesia Evaluation  Patient summary reviewed and Nursing notes reviewed no history of anesthetic complications:   Airway: Mallampati: Unable to assess / NA        Dental:          Pulmonary:   (+) pneumonia:                             Cardiovascular:    (+) hypertension:,                   Neuro/Psych:               GI/Hepatic/Renal:   (+) morbid obesity          Endo/Other:                     Abdominal:           Vascular:                                      Anesthesia Plan      general     ASA 4 - emergent     (Medications & allergies reviewed  All available lab & EKG data reviewed Septic shock & altered mental status mandates postoperative ventilation)  Induction: intravenous and rapid sequence. MIPS: Postoperative ventilation. Anesthetic plan and risks discussed with Unable to obtain due to emergent nature.                   Nash Clemons MD   1/3/2021

## 2021-01-03 NOTE — PROGRESS NOTES
Updated pt's daughter, Gray Bowden on pt status per pt request. Sally Diazs all questions at this time.

## 2021-01-04 NOTE — CONSULTS
Palliative Care Initial Note  Palliative Care Admit date:  1/4/21  Reason for c/s:  GOC, Code status    Advance Directives:  Pt has not executed AD's. In the absence of a HCPOA, pts three children are viewed as her collective NOK. Pts dtr, [address], lives locally and the two son's [place]. Pt currently has a full code status. Have reviewed the components of resuscitation w/ Ivett, who is a nurse, and reinforced the likelihood for lack of meaningful benefit if such interventions were employed. Ivett states she will d/w her brother's and consider amending code status. Plan of care/goals:  Spoke w/ Ivett via phone and provided update. She was appropriately tearful and perseverating on the need to \"come into the hospital\" as she feels pt \"has given up since no one can be there with her. \"   [address] is not inclined to \"make any big decisions\" unless/until she can see pt. We have discussed the opportunity for a virtual teleconf b/t pt, Ivett and pts son's and Ivett is extremely pleased by this possibility. Social/Spiritual:  Ivett will obtain email addresses from her brothers and  will contact her to get email's and coordinate a time for this teleconf. Plan:  Family teleconf being organized then writer will f/u w/ Ivett to advance discussions around [address] and code wishes.              Reason for consult:  _X_ Advance Care Planning  ___ Transition of Care Planning  _X_ Psychosocial/Spiritual Support  ___ Symptom Management                                                                                                                                                  Carrie Peraza RN

## 2021-01-04 NOTE — PROGRESS NOTES
ONCOLOGY HEMATOLOGY CARE PROGRESS NOTE      SUBJECTIVE:     Sedated on vent in the ICU with CRRT.       ROS:   The remaining 10 point review of symptoms is unremarkable. OBJECTIVE        Physical    VITALS:  BP (!) 164/94   Pulse 66   Temp 97.9 °F (36.6 °C) (Axillary)   Resp 16   Ht 5' 4\" (1.626 m)   Wt 227 lb 15.3 oz (103.4 kg)   SpO2 98%   BMI 39.13 kg/m²   TEMPERATURE:  Current - Temp: 97.9 °F (36.6 °C); Max - Temp  Av.5 °F (35.8 °C)  Min: 96 °F (35.6 °C)  Max: 97.9 °F (36.6 °C)  PULSE OXIMETRY RANGE: SpO2  Av.1 %  Min: 86 %  Max: 100 %  24HR INTAKE/OUTPUT:      Intake/Output Summary (Last 24 hours) at 2021 1127  Last data filed at 1/3/2021 2300  Gross per 24 hour   Intake 2621 ml   Output 807 ml   Net 1814 ml       CONSTITUTIONAL:  Appears acutely ill sedated on the vent   HEMATOLOGIC/LYMPHATICS:  no cervical lymphadenopathy, no supraclavicular lymphadenopathy, no axillary lymphadenopathy and no inguinal lymphadenopathy  LUNGS:  No increased work of breathing, good air exchange, clear to auscultation bilaterally, no crackles or wheezing  CARDIOVASCULAR:  , regular rate and rhythm, normal S1 and S2, no S3 or S4, and no murmur noted  ABDOMEN:  No scars, normal bowel sounds, soft, non-distended, non-tender, no masses palpated, no hepatosplenomegally  MUSCULOSKELETAL:  There is no redness, warmth, or swelling of the joints. EXTREMETIES: No clubbing cynosis or edema  NEUROLOGIC:  Awake, alert, oriented to name, place and time. Cranial nerves II-XII are grossly intact. Motor is 5 out of 5 bilaterally.    SKIN:  no bruising or bleeding      Data      Recent Labs     21  0430 21  0538 21  1337 21  1629 21  0515   WBC 19.7* 18.6*  --   --  17.3*   HGB 10.2* 10.1* 10.6* 10.3* 9.6*   HCT 31.4* 30.8*  --   --  29.6*    305  --   --  206   MCV 83.7 83.7  --   --  84.9        Recent Labs     21  0538 21  2145 21 0515    141 135*   K 3.7  3.7 3.9 3.7   CL 92* 97* 97*   CO2 39* 31 28   PHOS 3.5 2.9 1.9*   BUN 79* 59* 45*   CREATININE 3.1* 2.1* 1.6*     Recent Labs     01/01/21  1238 01/01/21  1714 01/03/21  0538   AST 29 18 15   ALT 11 9* 6*   BILIDIR <0.2 <0.2  --    BILITOT 0.4 0.4 0.3   ALKPHOS 82 74 76       Magnesium:    Lab Results   Component Value Date    MG 2.40 01/04/2021    MG 2.40 01/03/2021    MG 2.80 01/03/2021         Problem List  Patient Active Problem List   Diagnosis    Bronchopneumonia    Hypoxia    CKD (chronic kidney disease)    Class 2 obesity in adult    Small bowel obstruction (HCC)    SIRS (systemic inflammatory response syndrome) (HCC)    Bandemia    Acute respiratory distress    Lactic acidosis    Pneumoperitoneum    Acute-on-chronic kidney injury ([address]    Atelectasis    Septic shock (HCC)    Abnormal CT of the abdomen    Acute postoperative pulmonary insufficiency (HCC)    S/P partial colectomy    Hyperglycemia       ASSESSMENT AND PLAN:    1.) Transverse colon cancer  - Has pT3 pN0 colon cancer s/p surgery with neg margins.  - High risk features include presentation with obstruction and <12 LNs. - Would benefit from MSI testing. If MS-H then no chemo. If MS-Stable, then could consider adjuvant Capecitabine (\"chemo pill\"). - When she is improved, she could have a CT w/ contrast as an outpatient for staging.   - Await large clinical improvement           ONCOLOGIC DISPOSITION: per ICU team; palliative care to see patient today. Agree with pall consult if unable to improvement from perforated bowel standpoint.        Sergio Khalil, MARYLOU  [facility]   Please contact through [address]

## 2021-01-04 NOTE — PROGRESS NOTES
Hospitalist Progress Note      PCP: Sebas Ornelas MD    Date of Admission: 12/27/2020    Chief Complaint: Constipation, abdominal pain, n/v    Hospital Course: 74-year-old CF p/w colonic obstruction secondary to transverse colon mass for which she underwent  left colectomy with anastomosis and mobilization of splenic flexure on 12/27/2020 for obstructing transverse colon mass . Developed postoperative ileus with some pneumoperitoneum on 1/1/2021 requiring to be transferred to ICU for further evaluation and management. Had CT abdomen pelvis on 1/1/2021 which showed possible postoperative  surgical changes  but no focal fluid collections suggestive of abscess . General surgery advised NG tube and LIS. Patient also had sepsis of unclear source for which she was empirically started on IV Merrem after obtaining blood cultures. Subjective: Patient currently intubated and on sedation. Currently on maximum pressor support with 3 pressors including epinephrine, Levophed, vasopressin gtt. Nephrology started patient on CRRT since yesterday evening. General surgery following. D/w RN regarding care plan.       Medications:  Reviewed    Infusion Medications    PN-Adult Premix 5/20 - Standard Electrolytes - Central Line      PN-Adult Premix 5/20 - Standard Electrolytes - Central Line 65 mL/hr at 01/03/21 1821    vasopressin (Septic Shock) infusion 0.04 Units/min (01/04/21 1506)    fentaNYL (SUBLIMAZE) infusion 50 mcg/hr (01/04/21 0525)    propofol 30 mcg/kg/min (01/04/21 1637)    EPINEPHrine infusion 4 mcg/min (01/04/21 1300)    prismaSATE BGK 4/2.5 2,000 mL/hr (01/04/21 0400)    prismaSATE BGK 4/2.5 500 mL/hr (01/04/21 0400)    prismaSATE BGK 4/2.5 500 mL/hr (01/04/21 0400)    dextrose      norepinephrine 20 mcg/min (01/04/21 1300)    bupivacaine 0.5%       Scheduled Medications    mupirocin   Nasal BID    chlorhexidine  15 mL Mouth/Throat BID  carboxymethylcellulose PF  1 drop Both Eyes 6 times per day    insulin lispro  0-18 Units Subcutaneous 6 times per day    albumin human  25 g Intravenous Q6H    insulin glargine  10 Units Subcutaneous Nightly    fat emulsion  250 mL Intravenous Once per day on Mon Thu    potassium chloride  40 mEq Intravenous Once    meropenem  1 g Intravenous Q12H    sodium chloride flush  10 mL Intravenous 2 times per day    pantoprazole  40 mg Intravenous Daily    heparin (porcine)  5,000 Units Subcutaneous 3 times per day     PRN Meds: potassium chloride, magnesium sulfate, calcium gluconate **OR** calcium gluconate **OR** calcium gluconate **OR** calcium gluconate, sodium phosphate IVPB **OR** sodium phosphate IVPB **OR** sodium phosphate IVPB **OR** sodium phosphate IVPB, glucose, dextrose, glucagon (rDNA), dextrose, ondansetron, potassium chloride, sodium chloride flush, HYDROmorphone, polyethylene glycol, acetaminophen **OR** acetaminophen, prochlorperazine      Intake/Output Summary (Last 24 hours) at 1/4/2021 1729  Last data filed at 1/3/2021 2300  Gross per 24 hour   Intake    Output 297 ml   Net -297 ml       Physical Exam Performed:  BP (!) 164/94   Pulse 63   Temp 99.2 °F (37.3 °C) (Rectal)   Resp 16   Ht 5' 4\" (1.626 m)   Wt 227 lb 15.3 oz (103.4 kg)   SpO2 98%   BMI 39.13 kg/m²     General appearance: Intubated and on ventilator, sedated . NG to LIS  Respiratory intubated and on ventilator  Cardiovascular: Regular rate and rhythm with normal S1/S2 without murmurs, rubs or gallops. Abdomen: hypoactive bowel sounds, soft, non-distended, tenderness noted appropriate, onQ ball in place   Musculoskeletal: No clubbing, cyanosis or edema bilaterally.  Full range of motion without deformity. Skin: Skin color, texture, turgor normal.  No rashes or lesions.   Neurologic: Intubated and sedated  Psychiatric: intubated and sedated      Labs:   Recent Labs     01/02/21  0430 01/03/21  0538 01/03/21 1337 01/03/21  1629 01/04/21  0515   WBC 19.7* 18.6*  --   --  17.3*   HGB 10.2* 10.1* 10.6* 10.3* 9.6*   HCT 31.4* 30.8*  --   --  29.6*    305  --   --  206     Recent Labs     01/03/21  2145 01/04/21  0515 01/04/21  1330    135* 135*   K 3.9 3.7 3.5   CL 97* 97* 97*   CO2 31 28 28   BUN 59* 45* 33*   CREATININE 2.1* 1.6* 1.3*   CALCIUM 8.0* 7.9* 7.8*   PHOS 2.9 1.9* 2.6     Urinalysis:    Lab Results   Component Value Date    NITRU Negative 12/27/2020    BLOODU Negative 12/27/2020    SPECGRAV 1.020 12/27/2020    GLUCOSEU Negative 12/27/2020       Radiology:  XR CHEST PORTABLE   Final Result   Right-sided Vas-Cath with tip projecting in the region of the lower SVC. CT ABDOMEN PELVIS WO CONTRAST Additional Contrast? None   Final Result   Nasogastric tube is in normal position. Colocolonic anastomosis of the transverse colon is noted. There is no   adjacent fluid collection. Stable small to moderate amount of pneumoperitoneum. Free fluid is slightly increased, in the perihepatic and right lower quadrant   intermittent mesenteric spaces. Free fluid in pelvis is unchanged. The   degree of fluid is difficult to quantitate. No abscess collection is evident. Nonspecific pattern with dilation of small bowel loops in the right lower. Other small bowel loops are less dilated in the interval.      Small bilateral pleural effusions. Increased bibasilar atelectasis. Pneumonia differential concern. XR CHEST PORTABLE   Final Result   Right PICC tip near the superior atrial caval junction, in good position. Enteric tube tip likely in the body of the stomach. Intraperitoneal free air seen on comparison chest radiograph is not well   evaluated. Low lung volume with bibasilar subsegmental atelectasis. IR PICC WO SQ PORT/PUMP > 5 YEARS   Final Result   Successful placement of PICC line.          CT ABDOMEN PELVIS WO CONTRAST Additional Contrast? None Final Result   Postsurgical changes from partial colon resection. Small amount of free   fluid within the pelvis. No defined pelvic or abdominal collection. Small amount of free air within the abdomen. Findings may be postsurgical   given recent procedure. Dilated loops of small bowel without a discrete transition point noted to   suggest high-grade obstruction findings may represent ileus. Very small bilateral pleural effusions and atelectasis in the lower lobes. XR CHEST PORTABLE   Final Result   Pneumoperitoneum is now appreciated. This is favored to be postoperative,   noting laparotomy clips on 12/30/2020. Clinical correlation is recommended. Bibasilar atelectasis. XR ACUTE ABD SERIES CHEST 1 VW   Final Result   Minimal left basilar atelectasis. Interval postsurgical changes noted over   the mid abdomen with prominent appearing bowel loops likely indicative of   ileus versus developing partial small bowel obstruction. No   pneumoperitoneum. Recommend comparison with clinical exam and short-term   interval follow-up study. Interval removal of the feeding tube. XR ABDOMEN (KUB) (SINGLE AP VIEW)   Final Result   Gastric tube in the stomach         CT ABDOMEN PELVIS WO CONTRAST Additional Contrast? None   Final Result   1. 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 There is at least partial bowel obstruction at the level of the lesion with   proximal colonic and small bowel distension. The lesion is best visualized   on series 601, image 38.   2. No other acute findings within the abdomen or pelvis. 3. Status post cholecystectomy and hysterectomy.                Active Hospital Problems    Diagnosis Date Noted    Acute postoperative pulmonary insufficiency ([address] [J95.2] 01/03/2021    S/P partial colectomy [Z90.49] 01/03/2021    Hyperglycemia [R73.9] 01/03/2021    Abnormal CT of the abdomen [R93.5]  Septic shock ([facility] [A41.9, R65.21] 01/02/2021    SIRS (systemic inflammatory response syndrome) ([facility] [R65.10] 01/01/2021    Bandemia [D72.825] 01/01/2021    Acute respiratory distress [R06.03] 01/01/2021    Lactic acidosis [E87.2] 01/01/2021    Pneumoperitoneum [K66.8] 01/01/2021    Acute-on-chronic kidney injury ([facility] [N17.9, N18.9] 01/01/2021    Atelectasis [J98.11] 01/01/2021    Small bowel obstruction ([facility] [K56.609] 12/27/2020    Class 2 obesity in adult [E66.9] 02/05/2018     Assessment/Plan:  Invasive colonic adenocarcinoma  complicated by large bowel obstruction POA  - surgery consulted - S/p left colectomy with anastomosis and mobilization of splenic flexure   for obstructing transverse colon mass on 12/28/20 .    - NG tube d/consuelo on 12/29/20. General surgery started on clear liquid diet . Patient developed postoperative ileus on 12/31/2020 . Placed back n.p.o. follow-up x-ray on 1/1/2021 suggestive of mild pneumoperitoneum.   - CT abdomen pelvis w/o contrast on 1/1/'21  -showed mild pneumoperitoneum likely related to post surgical changes but no focal fluid collection suggestive of abscess. General surgery following and placed back and NG-tube on 1/1/2021 and connected to LIS ; On 1/3/21 patient had a follow-up CT abdomen pelvis which showed persistent pneumoperitoneum with worsening hemodynamics -  general surgery decided to take her to the OR. Noted anastomotic leak s/p partial colectomy and colostomy done on 1/3/2021  -Continue pain control.  -Oncology Consulted - Added MSI (micro satellite instability) screening and recommended outpatient follow-up    Septic shock  -noted on 1/1/2021. Likely from anastomotic leak and peritonitis -likely GI/ source in the postoperative setting . Requiring MAX pressor support (currently requiring epinephrine, norepinephrine, vasopressin gtt.) .   -As evidenced by significant leukocytosis 29K, elevated lactic 2.5; hypotension, tachycardia -Received fluid bolus and transferred patient to ICU for further closer monitoring on 1/1/2021  -Critical care  Following and assisting with Mx .  -blood cultures x2 on 1/1/2021 NGTD, empirically started on IV Merrem (s on 1/1/21) .  CHELSEA due to dehydration from above, in the setting of CKD3  (Baseline Cr is perhaps 1.4.)  -initially improved and then worsened. Likely due to ischemic ATN . -Admission creatinine 2.6;  Improved with  IVFs to 1.2  and started worsening again since 1/1/2021 . Creatinine peaked at 3.1  . Nephrology consulted and started on CRRT since 1/3/2021     Hypovolemic hyponatremia  POA - Na 128 on admission - Resolved   -Currently hypernatremic. Continue IV hydration and monitor.     HTN  - held HCTZ while on IVFs. - continued bisoprolol (metoprolol here) and amlodipine ; Held again on 1/1/2021 given hypotension      HLD  - statin    Postoperative respiratory failure -currently intubated on ventilation since 1/3/2021. Pulmonology assisting with vent management    DVT Prophylaxis: Lovenox   Diet: Diet NPO Effective Now Exceptions are: Ice Chips, Sips with Meds  PN-Adult Premix 5/20 - Standard Electrolytes - Central Line  PN-Adult Premix 5/20 - Standard Electrolytes - Central Line  Code Status: Full Code    PT/OT Eval Status: evaluated on 12/29/2020 with recommendations to home with home care    Dispo -likely  4- 5  days pending postop course, p.o. tolerance and clearance by general surgery. Patient condition is critical and prognosis is guarded at best     The note was completed using Dragon -speech recognition software & EMR  . Every effort was made to ensure accuracy; however, inadvertent computerized transcription errors may be present.     Radha Garay MD

## 2021-01-04 NOTE — FLOWSHEET NOTE
scheduled Zoom conference with patient's daughter Imtiaz Pruett) and possibly other family members at 67 today. Link has been e-mailed to [employer].  will assist with the conference from patient's room. RN has been notified.        01/04/21 3807   Encounter Summary   Services provided to: Family   Referral/Consult From: Palliative Care

## 2021-01-04 NOTE — PROGRESS NOTES
INPATIENT PULMONARY CRITICAL CARE PROGRESS NOTE      Reason for visit    SIRS/hypotension     SUBJECTIVE: Patient continues to be critically ill in the ICU, patient's blood pressure dropped yesterday evening and patient was started on IV pressors which were continuing to maintain hemodynamics and the requirements for pressors have increased when seen overnight , patient was afebrile this morning, patient was not sinus rhythm on the monitor, patient had elevated blood sugars, patient was subjected to a CT of the abdomen and after discussing with surgery it was decided that patient needs to go to the OR and as per the operating note-Preop Dx:                    Possible Anastomotic Leak     Postop Dx:                  Same     Procedure:                  Partial Colectomy and Colostomy    Patient was brought back to the ICU on mechanical ventilator support, patient was significantly hypotensive in spite of getting IV fluids and patient was on Levophed as well as vasopressin infusion and then also patient's blood pressure had dropped as low as systolic blood pressure was 32 mm Hg  on the monitor, patient was started on epinephrine infusion, patient had transient cardiac arrhythmias, patient's urine output has been dropping and patient was +8 L, patient was evaluated by nephrology and given the overall clinical status which has deteriorated significantly it was decided to start the patient on CRRT, patient being kept even on the CRRT, no other pertinent review of system could be obtained because of patient clinical status which is precarious and deteriorating and quite critical        Physical Exam:  Blood pressure (!) 164/94, pulse 88, temperature 97.9 °F (36.6 °C), temperature source Axillary, resp.  rate 16, height 5' 4\" (1.626 m), weight 227 lb 15.3 oz (103.4 kg), SpO2 99 %.' Constitutional: In respiratory distress when seen  this morning when patient was not intubated at patient been reevaluated this evening was intubated on mechanical vent support  HENT:   Endotracheal tube present. No thyromegaly. Eyes:  Conjunctivae are normal. Pupils equal, round, and reactive to light. No scleral icterus. Neck: . No tracheal deviation present. No obvious thyroid mass. Short enlarged neck  Cardiovascular: Normal rate, regular rhythm, normal heart sounds. No right ventricular heave. No lower extremity edema. Pulmonary/Chest: No wheezes. Bilateral rales. Chest wall is not dull to percussion. Distinct accessory muscle usage, no stridor. Decreased breath sound intensity at the bases   Abdominal: Distended, surgical staples present, patient has significant diffuse tenderness with questionable rebound tenderness;ostimy present whenreassesses this evening   Musculoskeletal: No cyanosis. No clubbing. No obvious joint deformity. Lymphadenopathy: No cervical or supraclavicular adenopathy. Skin: Skin is warm and dry. No rash or nodules on the exposed extremities. Psychiatric: Normal mood and affect. Behavior is normal.  Slight anxiety. This morning  Neurologic: Alert, awake and oriented. PERRL.   Speech fluent; intubated and sedated when seen later this evening         Results:  CBC:   Recent Labs     01/01/21  0751 01/02/21  0430 01/03/21  0538 01/03/21  1337 01/03/21  1629   WBC 26.9* 19.7* 18.6*  --   --    HGB 11.7* 10.2* 10.1* 10.6* 10.3*   HCT 36.5 31.4* 30.8*  --   --    MCV 83.5 83.7 83.7  --   --     263 305  --   --      BMP:   Recent Labs     01/01/21  1238 01/01/21  1714 01/02/21  0430 01/02/21  1432 01/03/21  0538    140 146*  --  143   K 3.5 3.7 3.2*  3.2* 4.2 3.7  3.7    97* 99  --  92*   CO2 15* 30 36*  --  39*   PHOS 2.4* 2.0*  --   --  3.5   BUN 42* 49* 57*  --  79*   CREATININE 1.6* 2.3* 2.8*  --  3.1*     LIVER PROFILE:   Recent Labs     01/01/21 1238 01/01/21  1714 01/03/21  0538   AST 29 18 15   ALT 11 9* 6*   LIPASE 44.0 27.0  --    BILIDIR <0.2 <0.2  --    BILITOT 0.4 0.4 0.3   ALKPHOS 82 74 76       Imaging:  I have reviewed radiology images personally. XR CHEST PORTABLE   Final Result   Right-sided Vas-Cath with tip projecting in the region of the lower SVC. CT ABDOMEN PELVIS WO CONTRAST Additional Contrast? None   Final Result   Nasogastric tube is in normal position. Colocolonic anastomosis of the transverse colon is noted. There is no   adjacent fluid collection. Stable small to moderate amount of pneumoperitoneum. Free fluid is slightly increased, in the perihepatic and right lower quadrant   intermittent mesenteric spaces. Free fluid in pelvis is unchanged. The   degree of fluid is difficult to quantitate. No abscess collection is evident. Nonspecific pattern with dilation of small bowel loops in the right lower. Other small bowel loops are less dilated in the interval.      Small bilateral pleural effusions. Increased bibasilar atelectasis. Pneumonia differential concern. XR CHEST PORTABLE   Final Result   Right PICC tip near the superior atrial caval junction, in good position. Enteric tube tip likely in the body of the stomach. Intraperitoneal free air seen on comparison chest radiograph is not well   evaluated. Low lung volume with bibasilar subsegmental atelectasis. IR PICC WO SQ PORT/PUMP > 5 YEARS   Final Result   Successful placement of PICC line. CT ABDOMEN PELVIS WO CONTRAST Additional Contrast? None   Final Result   Postsurgical changes from partial colon resection. Small amount of free   fluid within the pelvis. No defined pelvic or abdominal collection. Small amount of free air within the abdomen. Findings may be postsurgical   given recent procedure.       Dilated loops of small bowel without a discrete transition point noted to suggest high-grade obstruction findings may represent ileus. Very small bilateral pleural effusions and atelectasis in the lower lobes. XR CHEST PORTABLE   Final Result   Pneumoperitoneum is now appreciated. This is favored to be postoperative,   noting laparotomy clips on 12/30/2020. Clinical correlation is recommended. Bibasilar atelectasis. XR ACUTE ABD SERIES CHEST 1 VW   Final Result   Minimal left basilar atelectasis. Interval postsurgical changes noted over   the mid abdomen with prominent appearing bowel loops likely indicative of   ileus versus developing partial small bowel obstruction. No   pneumoperitoneum. Recommend comparison with clinical exam and short-term   interval follow-up study. Interval removal of the feeding tube. XR ABDOMEN (KUB) (SINGLE AP VIEW)   Final Result   Gastric tube in the stomach         CT ABDOMEN PELVIS WO CONTRAST Additional Contrast? None   Final Result   1. 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 There is at least partial bowel obstruction at the level of the lesion with   proximal colonic and small bowel distension. The lesion is best visualized   on series 601, image 38.   2. No other acute findings within the abdomen or pelvis. 3. Status post cholecystectomy and hysterectomy. Results for Kristin Wilder (MRN [id number]) as of 1/3/2021 21:59   Ref.  Range 1/3/2021 13:37 1/3/2021 13:45 1/3/2021 14:30 1/3/2021 16:29   Calcium, Ion Latest Ref Range: 1.12 - 1.32 mmol/L 1.06 (L)   1.08 (L)   Lactic Acid Latest Ref Range: 0.4 - 2.0 mmol/L  5.1 (HH)     Lactate, ser/plas Latest Ref Range: 0.40 - 2.00 mmol/L 6.12 (HH)   5.94 (HH)   POC Glucose Latest Ref Range: 70 - 99 mg/dl 215 (H)  219 (H) 270 (H)   POC Sodium Latest Ref Range: 136 - 145 mmol/L 151 (H)   150 (H)   POC Potassium Latest Ref Range: 3.5 - 5.1 mmol/L 4.5   3.5   POC Hematocrit Latest Ref Range: 36.0 - 48.0 % 31.0 (L)   30.0 (L) Results for Josie Bhat (MRN [id number]) as of 1/3/2021 21:59   Ref. Range 1/1/2021 07:51 1/2/2021 04:30 1/3/2021 05:38 1/3/2021 13:37 1/3/2021 16:29   WBC Latest Ref Range: 4.0 - 11.0 K/uL 26.9 (H) 19.7 (H) 18.6 (H)     RBC Latest Ref Range: 4.00 - 5.20 M/uL 4.38 3.75 (L) 3.68 (L)     Hemoglobin Quant Latest Ref Range: 12.0 - 16.0 gm/dL 11.7 (L) 10.2 (L) 10.1 (L) 10.6 (L) 10.3 (L)   Hematocrit Latest Ref Range: 36.0 - 48.0 % 36.5 31.4 (L) 30.8 (L)     POC Hematocrit Latest Ref Range: 36.0 - 48.0 %    31.0 (L) 30.0 (L)   MCV Latest Ref Range: 80.0 - 100.0 fL 83.5 83.7 83.7     MCH Latest Ref Range: 26.0 - 34.0 pg 26.7 27.3 27.4     MCHC Latest Ref Range: 31.0 - 36.0 g/dL 32.0 32.6 32.7     MPV Latest Ref Range: 5.0 - 10.5 fL 8.5 7.9 8.2     RDW Latest Ref Range: 12.4 - 15.4 % 14.8 14.8 14.9     Platelet Count Latest Ref Range: 135 - 450 K/uL 384 263 305     Neutrophils % Latest Units: % 86.0 92.4 93.1     Lymphocyte % Latest Units: % 2.0 4.4 4.4     Monocytes % Latest Units: % 2.0 2.8 1.9       Results for Josie Bhat (MRN [id number]) as of 1/3/2021 21:59   Ref. Range 2/5/2018 14:20 1/1/2021 12:38 1/1/2021 18:20 1/3/2021 13:45 1/3/2021 19:45   Lactic Acid Latest Ref Range: 0.4 - 2.0 mmol/L 1.7 2.5 (H) 1.3 5.1 (HH) 5.7 (HH)         Assessment:  Principal Problem:    Small bowel obstruction (HCC)  Active Problems:    Class 2 obesity in adult    SIRS (systemic inflammatory response syndrome) (HCC)    Bandemia    Acute respiratory distress    Lactic acidosis    Pneumoperitoneum    Acute-on-chronic kidney injury ([address]    Atelectasis    Septic shock (HCC)    Abnormal CT of the abdomen    Acute postoperative pulmonary insufficiency (HCC)    S/P partial colectomy    Hyperglycemia  Resolved Problems:    * No resolved hospital problems.  *          Plan:   · Ventilatory support to keep saturation between 90 to 94% only  · Ventilator waveforms and settings reviewed · Ventilator changes made multiple times during the course of the evening  · IV sedation to maintain patient ventilator synchrony  · Titration of sedation as per RASS scores  · Pulmonary toilet  · IV fluid and  IV pressors to keep mean arterial pressure more than 65 mmHg-patient's pressor requirements have gone up and patient was on Levophed along with vasopressin infusion after the surgery but had to be started on epinephrine infusion  · Monitor for any worsening shock  · Status post laparotomy with partial colectomy and colostomy  · Colostomy care as per surgery and wound care team  · Patient has been started on IV meropenem which can be continued-titration as per clinical status and cultures  · No need for any additional IV Flagyl at this time  · NG tube to intermittent suction to continue  · Patient's lactic acid levels to be trended-significantly worsened today  · Patient has oliguric renal failure and given the patient's clinical status and worsening overall urine output was decided to start CRRT  · CRRT as per nephrology  · TPN has been started  · Patient has significant hyperglycemia after the TPN  · Blood glucose monitoring with sliding scale insulin  · Low days Lantus insulin started and titration as per blood glucose monitoring  · Monitor input output and BMP  · Correct electrolytes on whenever necessary basis  · IV PPI to continue  · DVT prophylaxis      Case d/w ICU team  Case surgery multiple times in the course of the day and patient was actively managed multiple times during the course of the day        Critical care time from the patient was 90 minutes exclusive of any procedures    Patient's overall clinical status has deteriorated significantly and there is a possibility that patient may not survive   Will request palliative consult to establish the goals of care and CODE STATUS       Electronically signed by:  Izabella Prasad MD    1/3/2021    10:05 PM.

## 2021-01-04 NOTE — CARE COORDINATION
Chart review completed. Patient a 68year old female, admitted for colonic obstruction. Hospital day 8, currently in ICU level of care on vent and CRRT. Per oncology documentation from today pt to be seen by palliative care for ongoing goals of care discussions. 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 Was to go to daughters at discharge then return home with support from son. CM will follow up after PC discussions.  Michael Valerio RN

## 2021-01-04 NOTE — PROGRESS NOTES
This note also relates to the following rows which could not be included:  SpO2 - Cannot attach notes to unvalidated device data       01/03/21 2009   Vent Information   Skin Assessment Clean, dry, & intact   Vent Type 840   Vent Mode AC/PC   Pressure Ordered 20   Rate Set 16 bmp   Pressure Support 0 cmH20   FiO2  80 %   Sensitivity 3   PEEP/CPAP 7   I Time/ I Time % 1 s   Humidification Source HME   Vent Patient Data   High Peep/I Pressure 20   Peak Inspiratory Pressure 28 cmH2O   Mean Airway Pressure 13 cmH20   Rate Measured 16 br/min   Vt Exhaled 645 mL   Minute Volume 10.8 Liters   I:E Ratio 1:2.40   Spontaneous Breathing Trial (SBT) RT Doc   Pulse 90   Breath Sounds   Right Upper Lobe Diminished   Right Middle Lobe Diminished   Right Lower Lobe Diminished   Left Upper Lobe Diminished   Left Lower Lobe Diminished   Additional Respiratory  Assessments   Resp 17   Position Semi-Otero's   Alarm Settings   High Pressure Alarm 40 cmH2O   Low Minute Volume Alarm 3 L/min   High Respiratory Rate 40 br/min   Patient Observation   Observations ambu @ bedside   [REMOVED] ETT (adult)   Removal Date/Time: 01/03/21 1405  Placement Date/Time: 01/03/21 1149   Preoxygenation: Yes  Mask Ventilation: Mask ventilation not attempted (0)  Technique: Direct laryngoscopy;Rapid sequence;Cricoid pressure;Stylet  Type: Cuffed  Tube Size: 7 mm  Lar. ..    Secured at 22 cm   Measured From Lips   ET Placement Right   Secured By Commercial tube weber   Site Condition Dry

## 2021-01-04 NOTE — PROGRESS NOTES
01/04/21 0007   Vent Information   Vent Type 840   Vent Mode AC/PC   Pressure Ordered 20   Rate Set 16 bmp   Pressure Support 0 cmH20   FiO2  70 %   SpO2 98 %   SpO2/FiO2 ratio 140   Sensitivity 3   PEEP/CPAP 7.5   I Time/ I Time % 1.1 s   Humidification Source HME   Vent Patient Data   High Peep/I Pressure 20   Peak Inspiratory Pressure 28 cmH2O   Mean Airway Pressure 13 cmH20   Rate Measured 16 br/min   Vt Exhaled 649 mL   Minute Volume 10.8 Liters   I:E Ratio 1:2.40   Cough/Sputum   Sputum How Obtained Endotracheal   Cough Non-productive   Spontaneous Breathing Trial (SBT) RT Doc   Pulse 77   Breath Sounds   Right Upper Lobe Clear   Right Middle Lobe Clear   Right Lower Lobe Diminished   Left Upper Lobe Clear   Left Lower Lobe Diminished   Additional Respiratory  Assessments   Resp 16   Position Semi-Otero's   Cuff Pressure (cm H2O) 28 cm H2O   Alarm Settings   High Pressure Alarm 40 cmH2O   Low Minute Volume Alarm 3 L/min   High Respiratory Rate 40 br/min   Low Exhaled Vt  300 mL   ETT (adult)   Placement Date/Time: 01/03/21 1600   Timeout: Patient;Procedure;Site/Side;Appropriate Equipment  Type: Cuffed  Tube Size: 7 mm  Location: Oral  Secured at: 22 cm  Placed By: In surgery  Measured From: Lips   Secured at 22 cm   Measured From Lips   ET Placement Left   Secured By Commercial tube weber   Site Condition Dry   Cuff Pressure 28 cm H2O

## 2021-01-04 NOTE — PROGRESS NOTES
01/04/21 0843   Vent Information   Vent Type 840   Vent Mode AC/PC   Pressure Ordered 20   Rate Set 16 bmp   Pressure Support 0 cmH20   FiO2  60 %   SpO2 98 %   SpO2/FiO2 ratio 163.33   Sensitivity 3   PEEP/CPAP 7.5   I Time/ I Time % 1.1 s   Humidification Source HME   Vent Patient Data   High Peep/I Pressure 20   Peak Inspiratory Pressure 28 cmH2O   Mean Airway Pressure 14 cmH20   Rate Measured 16 br/min   Vt Exhaled 675 mL   Minute Volume 10.8 Liters   I:E Ratio 1:2.40   Plateau Pressure 24 EFL28   Cough/Sputum   Sputum How Obtained Endotracheal;Suctioned   Cough Productive   Sputum Amount Small   Sputum Color Cloudy   Tenacity Thin   Spontaneous Breathing Trial (SBT) RT Doc   Pulse 66   Breath Sounds   Right Upper Lobe Clear   Right Middle Lobe Diminished   Right Lower Lobe Diminished   Left Upper Lobe Clear   Left Lower Lobe Diminished   Additional Respiratory  Assessments   Resp 16   Position Semi-Otero's   Cuff Pressure (cm H2O) 30 cm H2O   Alarm Settings   High Pressure Alarm 40 cmH2O   Low Minute Volume Alarm 3 L/min   High Respiratory Rate 40 br/min   Low Exhaled Vt  300 mL   Patient Observation   Observations 7 ETT, ambu bag @ bedside   ETT (adult)   Placement Date/Time: 01/03/21 1600   Timeout: Patient;Procedure;Site/Side;Appropriate Equipment  Type: Cuffed  Tube Size: 7 mm  Location: Oral  Secured at: 22 cm  Placed By: In surgery  Measured From: Lips   Secured at 22 cm   Measured From Lips   ET Placement Left   Secured By Commercial tube weber

## 2021-01-04 NOTE — PROGRESS NOTES
01/04/21 0414   Vent Information   Equipment Changed HME   Vent Type 840   Vent Mode AC/PC   Pressure Ordered 20   Rate Set 16 bmp   Pressure Support 0 cmH20   FiO2  60 %   SpO2 98 %   SpO2/FiO2 ratio 163.33   Sensitivity 3   PEEP/CPAP 7.5   I Time/ I Time % 1.1 s   Humidification Source HME   Vent Patient Data   High Peep/I Pressure 20   Peak Inspiratory Pressure 28 cmH2O   Mean Airway Pressure 13 cmH20   Rate Measured 16 br/min   Vt Exhaled 687 mL   Minute Volume 11 Liters   I:E Ratio 1:2.40   Cough/Sputum   Sputum How Obtained Endotracheal   Cough Non-productive   Spontaneous Breathing Trial (SBT) RT Doc   Pulse 66   Breath Sounds   Right Upper Lobe Clear   Right Middle Lobe Diminished   Right Lower Lobe Diminished   Left Upper Lobe Clear   Left Lower Lobe Diminished   Additional Respiratory  Assessments   Resp 16   Alarm Settings   High Pressure Alarm 40 cmH2O   Low Minute Volume Alarm 3 L/min   High Respiratory Rate 40 br/min   Low Exhaled Vt  300 mL   ETT (adult)   Placement Date/Time: 01/03/21 1600   Timeout: Patient;Procedure;Site/Side;Appropriate Equipment  Type: Cuffed  Tube Size: 7 mm  Location: Oral  Secured at: 22 cm  Placed By: In surgery  Measured From: Lips   Secured at 22 cm   Measured From Lips   ET Placement Right   Secured By Commercial tube weber   Site Condition Dry

## 2021-01-04 NOTE — PROGRESS NOTES
[facility] GENERAL SURGERY    Surgery Progress Note           POD # 7/1    PATIENT NAME: Jaya Fuentes     TODAY'S DATE: 1/4/2021    INTERVAL HISTORY:    Pt on pressor support, CRRT started. OBJECTIVE:   VITALS:  BP (!) 164/94   Pulse 66   Temp 97.9 °F (36.6 °C) (Axillary)   Resp 16   Ht 5' 4\" (1.626 m)   Wt 227 lb 15.3 oz (103.4 kg)   SpO2 98%   BMI 39.13 kg/m²     INTAKE/OUTPUT:    I/O last 3 completed shifts: In: 2621 [I.V.:1846]  Out: 807 [Urine:290; Drains:170; Blood:50]  No intake/output data recorded. CONSTITUTIONAL: sedated  ABDOMEN:   hypoactive bowel sounds, soft, non-distended, ostomy viable, jaycee serosanguinous   INCISION: clean    Data:  CBC:   Recent Labs     01/02/21  0430 01/03/21  0538 01/03/21  1337 01/03/21  1629 01/04/21  0515   WBC 19.7* 18.6*  --   --  17.3*   HGB 10.2* 10.1* 10.6* 10.3* 9.6*   HCT 31.4* 30.8*  --   --  29.6*    305  --   --  206     BMP:    Recent Labs     01/03/21 0538 01/03/21 2145 01/04/21  0515    141 135*   K 3.7  3.7 3.9 3.7   CL 92* 97* 97*   CO2 39* 31 28   BUN 79* 59* 45*   CREATININE 3.1* 2.1* 1.6*   GLUCOSE 278* 374* 417*     Hepatic:   Recent Labs     01/01/21  1238 01/01/21  1714 01/03/21  0538   AST 29 18 15   ALT 11 9* 6*   BILITOT 0.4 0.4 0.3   ALKPHOS 82 74 76     Mag:      Recent Labs     01/03/21  0538 01/03/21 2145 01/04/21  0515   MG 2.80* 2.40 2.40      Phos:     Recent Labs     01/03/21  0538 01/03/21 2145 01/04/21  0515   PHOS 3.5 2.9 1.9*      INR: No results for input(s): INR in the last 72 hours.       Radiology Review:  NA    ASSESSMENT AND PLAN:  68 y.o. female status post left colectomy; reexporation and anastomotic resection and colostomy  Neuro - continue sedation  Resp - vent management continued  CV - pressor support to maintain MAP  Renal - CRRT  GI - NG LCWS  ID - continue broad spectrum abx  Heme - stable  FEN - TPN         Electronically signed by Migue Miller MD

## 2021-01-04 NOTE — PROGRESS NOTES
Comprehensive Nutrition Assessment    Type and Reason for Visit:  Reassess    Nutrition Recommendations/Plan:   1. Continue Clinimix 5/20 standard electrolytes at 65 ml/hr (highest rate desired by nephrology)  2. Lipids increase to tid once Propofol discontinued  3. Will monitor electrolyte trends, fluid balance, renal function, and blood sugar control. Nutrition Assessment:  Follow up:  Status post partial colectomy with colostomy on 1/3/21. NG to LWS. Remains on vent and now in ICU. CRRT started 1/3/21. TPN started on 1/2/21, Clinimix 5/20 E at 42 ml/hr, increased to goal rate of 65 ml/hr still with standard electrolytes. Lipids ordered but on hold with Propofol infusion (573 calories). Malnutrition Assessment:  Malnutrition Status: At risk for malnutrition (Comment)      Estimated Daily Nutrient Needs:  Energy (kcal):  5604-1152 kcal; Weight Used for Energy Requirements:  Ideal(55 kg)     Protein (g):  66-83 g; Weight Used for Protein Requirements:  Ideal(1.2-1.5 g/kg)        Fluid (ml/day):   ; Method Used for Fluid Requirements:  1 ml/kcal      Nutrition Related Findings:  Na dropped to 135 mmol/L this am; NG to suction 600 ml last recorded output      Wounds:  Surgical Incision       Current Nutrition Therapies:    Diet NPO Effective Now Exceptions are: Ice Chips, Sips with Meds  PN-Adult Premix 5/20 - Standard Electrolytes - Central Line  PN-Adult Premix 5/20 - Standard Electrolytes - Central Line  Current Parenteral Nutrition Orders:  · Type and Formula: Premix Central   · Lipids: (on hold due to Propofol infusion)  · Goal PN Orders Provides: Clinimix 5/20 at goal rate of 65 mL/hr to provide 1373 kcal and 78 g protein. 250 mL bags of 20% lipids 3 times weekly to porivde additional 214 kcal daily.  GIR: 2.28      Anthropometric Measures:  · Height: 5' 4\" (162.6 cm)  · Current Body Weight: 227 lb 15 oz (103.4 kg)   · Ideal Body Weight: 120 lbs; % Ideal Body Weight 175 %   · BMI: 39.1 · Adjusted Body Weight:  ; No Adjustment   · Adjusted BMI:      · BMI Categories: Obese Class 2 (BMI 35.0 -39.9)       Nutrition Diagnosis:   · Inadequate oral intake related to altered GI function, altered GI structure as evidenced by NPO or clear liquid status due to medical condition(s/p colectomy and newly diagnosed colon cancer)    Nutrition Interventions:   Food and/or Nutrient Delivery:  Continue Current Parenteral Nutrition  Nutrition Education/Counseling:  No recommendation at this time   Coordination of Nutrition Care:  Continue to monitor while inpatient    Goals:  Patient will tolerate parenteral nutrition without significant fluid and electrolyte disturbances or blood sugars greater than 180 mg/dl. Nutrition Monitoring and Evaluation:   Behavioral-Environmental Outcomes:      Food/Nutrient Intake Outcomes:  Diet Advancement/Tolerance  Physical Signs/Symptoms Outcomes:  Biochemical Data, GI Status, Weight     Discharge Planning:     Too soon to determine     Electronically signed by Dhruv Fischer LD on 1/4/21 at 1:54 PM EST    Contact: Office: [phone]; [address] [place] [address] Road: [phone]

## 2021-01-04 NOTE — PROGRESS NOTES
Progress Note    HISTORY     CC:   Abdominal pain, constipation             We are following for acute kidney injury         Subjective/   HPI:  Seen and examined at CRRT (initiated on 1/3/21)  Remains on epi, levo and vaso gtt     S/p  Partial Colectomy and Colostomy for anastomotic leak on 1/3/21    ROS:  Constitutional:  No fevers,  Respiratory:   On vent   :  Jj placed     Social Hx:  No visitor     Scheduled Meds:   mupirocin   Nasal BID    chlorhexidine  15 mL Mouth/Throat BID    carboxymethylcellulose PF  1 drop Both Eyes 6 times per day    insulin lispro  0-18 Units Subcutaneous 6 times per day    sodium chloride  500 mL Intravenous Once    sodium phosphate IVPB  30 mmol Intravenous Once    insulin glargine  10 Units Subcutaneous Nightly    fat emulsion  250 mL Intravenous Once per day on Mon Thu    potassium chloride  40 mEq Intravenous Once    meropenem  1 g Intravenous Q12H    sodium chloride flush  10 mL Intravenous 2 times per day    pantoprazole  40 mg Intravenous Daily    heparin (porcine)  5,000 Units Subcutaneous 3 times per day     Continuous Infusions:   PN-Adult Premix 5/20 - Standard Electrolytes - Central Line      PN-Adult Premix 5/20 - Standard Electrolytes - Central Line 65 mL/hr at 01/03/21 1821    vasopressin (Septic Shock) infusion 0.04 Units/min (01/04/21 0526)    fentaNYL (SUBLIMAZE) infusion 50 mcg/hr (01/04/21 0525)    propofol 35 mcg/kg/min (01/04/21 0622)    EPINEPHrine infusion 12 mcg/min (01/04/21 0629)    prismaSATE BGK 4/2.5 2,000 mL/hr (01/04/21 0400)    prismaSATE BGK 4/2.5 500 mL/hr (01/04/21 0400)    prismaSATE BGK 4/2.5 500 mL/hr (01/04/21 0400)    dextrose      norepinephrine 40 mcg/min (01/04/21 0505)    bupivacaine 0.5% - Etiology:  ATN / ischemic   - Clinical: CRRT from 1/3/21 d/t worsening hemodynamics      Abdominal Pain:  Found to have     Metabolic Alkalosis:  Due to high NG output and chloride depletion      Hypotension:  Septic Shock.   Levo, vaso, epi on 1/4     Chronic Kidney Disease:  Stage 3  - Baseline:  ~ 1.4    -lactic acidosis    -hyperglycemia per ICU team  Plan/     - ml bolus  -iv albumin 25 gms  q6h times 4  -CRRT as ordered, keep even  -MAP>65 w pressors  -ica, mag, k, phos replacement protocols  -additional sod phos 30 mmol iv times one on 1/4  -CRRT dose meds  -cont TPN    Kathrine Laureano MD  [facility]  Office: [phone]  Fax:    [phone]

## 2021-01-04 NOTE — FLOWSHEET NOTE
facilitated Zoom meeting from bedside with patient's daughter Bimal Meadows), sons June Cadena and Acqutimbo Tobin), and granddaughter Delisa Appiah). Family tearful, expressed gratitude for the opportunity to see patient. Cite Gurdeep Elizabeth states that she will follow up with [employer]. Spiritual Care will continue to be available for support of family as needed. 01/04/21 1721   Encounter Summary   Services provided to: Family   Referral/Consult From: Palliative Care   Support System Children   Length of Encounter 15 minutes   Routine   Type Initial   Assessment Tearful; Anxious; Fearful   Intervention Sustaining presence/ Ministry of presence   Outcome Expressed gratitude;Expressed feelings/needs/concerns; Tearful

## 2021-01-05 NOTE — PROGRESS NOTES
01/05/21 1231   Vent Information   Vent Type 840   Vent Mode CPAP   Pressure Support 10 cmH20   FiO2  40 %   SpO2 96 %   SpO2/FiO2 ratio 240   Sensitivity 3   PEEP/CPAP 5   Vent Patient Data   High Peep/I Pressure 20   Peak Inspiratory Pressure 15 cmH2O   Mean Airway Pressure 9.3 cmH20   Rate Measured 24 br/min   Vt Exhaled 416 mL   Minute Volume 9.68 Liters   I:E Ratio 1:1.50   Cough/Sputum   Sputum How Obtained Endotracheal;Suctioned   Cough Non-productive   Sputum Amount None   Spontaneous Breathing Trial (SBT) RT Doc   Pulse 85   Breath Sounds   Right Upper Lobe Diminished   Right Middle Lobe Diminished   Right Lower Lobe Diminished   Left Upper Lobe Diminished   Left Lower Lobe Diminished   Additional Respiratory  Assessments   Resp 24   Position Semi-Otero's   Alarm Settings   High Pressure Alarm 40 cmH2O   Low Minute Volume Alarm 3 L/min   High Respiratory Rate 40 br/min   Low Exhaled Vt  300 mL   Patient Observation   Observations 7 ETT, ambu bag @ bedside   ETT (adult)   Placement Date/Time: 01/03/21 1600   Timeout: Patient;Procedure;Site/Side;Appropriate Equipment  Type: Cuffed  Tube Size: 7 mm  Location: Oral  Secured at: 22 cm  Placed By: In surgery  Measured From: Lips   Secured at 22 cm   Measured From Lips   ET Placement Right   Secured By Commercial tube weber

## 2021-01-05 NOTE — PROGRESS NOTES
01/05/21 0838   Vent Information   Vent Type 840   Vent Mode AC/PC   Pressure Ordered 20   Rate Set 16 bmp   Pressure Support 0 cmH20   FiO2  60 %   SpO2 99 %   SpO2/FiO2 ratio 165   Sensitivity 3   PEEP/CPAP 7.5   I Time/ I Time % 1.1 s   Humidification Source HME   Vent Patient Data   High Peep/I Pressure 20   Peak Inspiratory Pressure 28 cmH2O   Mean Airway Pressure 14 cmH20   Rate Measured 17 br/min   Vt Exhaled 803 mL   Minute Volume 12.8 Liters   I:E Ratio 1:2.40   Plateau Pressure 25 ARQ75   Cough/Sputum   Sputum How Obtained Endotracheal;Suctioned   Cough Productive   Sputum Amount Small   Sputum Color Cloudy   Tenacity Thin   Spontaneous Breathing Trial (SBT) RT Doc   Pulse 76   Breath Sounds   Right Upper Lobe Diminished   Right Middle Lobe Diminished   Right Lower Lobe Diminished   Left Upper Lobe Diminished   Left Lower Lobe Diminished   Additional Respiratory  Assessments   Resp 17   Position Semi-Otero's   Cuff Pressure (cm H2O) 30 cm H2O   Alarm Settings   High Pressure Alarm 40 cmH2O   Low Minute Volume Alarm 3 L/min   High Respiratory Rate 40 br/min   Low Exhaled Vt  300 mL   Patient Observation   Observations 7 ETT, ambu bag @ bedside   ETT (adult)   Placement Date/Time: 01/03/21 1600   Timeout: Patient;Procedure;Site/Side;Appropriate Equipment  Type: Cuffed  Tube Size: 7 mm  Location: Oral  Secured at: 22 cm  Placed By: In surgery  Measured From: Lips   Secured at 22 cm   Measured From Lips   ET Placement Left   Secured By [employer] tube weber

## 2021-01-05 NOTE — PROGRESS NOTES
Progress Note    HISTORY     CC:   Abdominal pain, constipation             We are following for acute kidney injury         Subjective/   HPI:  Seen and examined at CRRT (initiated on 1/3/21)  Remains on epi, levo and vaso gtt  CRRT down multiple times d.t increased TMP     S/p  Partial Colectomy and Colostomy for anastomotic leak on 1/3/21    ROS:  Constitutional:  No fevers,  Respiratory:   On vent   :  Jj placed     Social Hx:  No visitor     Scheduled Meds:   insulin glargine  20 Units Subcutaneous Nightly    mupirocin   Nasal BID    chlorhexidine  15 mL Mouth/Throat BID    carboxymethylcellulose PF  1 drop Both Eyes 6 times per day    insulin lispro  0-18 Units Subcutaneous 6 times per day    meropenem  1 g Intravenous Q12H    sodium chloride flush  10 mL Intravenous 2 times per day    pantoprazole  40 mg Intravenous Daily    heparin (porcine)  5,000 Units Subcutaneous 3 times per day     Continuous Infusions:   dextrose       PRN Meds:.morphine, potassium chloride, magnesium sulfate, calcium gluconate **OR** calcium gluconate **OR** calcium gluconate **OR** calcium gluconate, sodium phosphate IVPB **OR** sodium phosphate IVPB **OR** sodium phosphate IVPB **OR** sodium phosphate IVPB, glucose, dextrose, glucagon (rDNA), dextrose, ondansetron, sodium chloride flush, HYDROmorphone, polyethylene glycol, acetaminophen **OR** acetaminophen, prochlorperazine      EXAM       Objective/     Vitals:    01/05/21 0600 01/05/21 0700 01/05/21 0838 01/05/21 1231   BP:       Pulse: 73  76 85   Resp: 16 16 17 24   Temp:       TempSrc:       SpO2: 100% 100% 99% 96%   Weight:       Height:         24HR INTAKE/OUTPUT:      Intake/Output Summary (Last 24 hours) at 1/5/2021 1505  Last data filed at 1/4/2021 2220  Gross per 24 hour   Intake 256 ml   Output    Net 256 ml     Constitutional:  Critically ill   Eyes:  Pupils reactive, sclera clear   Neck:  Normal thyroid, no masses Cardiovascular:  Regular, no rub  Respiratory: On vent, no wheezing, FiO2 60%  Psychiatry:  Sedated on vent   Abdomen: ostomy, no bowel sounds, soft   Musculoskeletal: No LE edema, no clubbing   Lymphatics:  No LAD in neck, no supraclavicular nodes   rij vasc cath placed on 1/3    MEDICAL DECISION MAKING       Data/  Recent Labs     01/03/21  0538 01/03/21  0538 01/03/21  1629 01/04/21  0515 01/05/21  0524   WBC 18.6*  --   --  17.3* 15.4*   HGB 10.1*   < > 10.3* 9.6* 7.1*   HCT 30.8*  --   --  29.6* 21.2*   MCV 83.7  --   --  84.9 84.6     --   --  206 128*    < > = values in this interval not displayed. Recent Labs     01/05/21  0035 01/05/21  0524 01/05/21  1215   * 130* 134*   K 4.4 5.1 4.1   CL 98* 96* 98*   CO2 25 24 28   GLUCOSE 307* 329* 219*   PHOS 2.2* 3.2 3.1   MG 2.00 1.90 1.80   BUN 25* 21* 24*   CREATININE 0.9 0.8 0.9   LABGLOM >60 >60 >60   GFRAA >60 >60 >60       Assessment/     Acute Kidney Injury:  KDIGO stage 2  - Etiology:  ATN / ischemic   - Clinical: CRRT from 1/3/21 d/t worsening hemodynamics      Abdominal Pain:  Found to have     Metabolic Alkalosis:  Due to high NG output and chloride depletion      Hypotension:  Septic Shock.   Levo, vaso, epi on 1/4     Chronic Kidney Disease:  Stage 3  - Baseline:  ~ 1.4    -lactic acidosis    -hyperglycemia per ICU team  Plan/     Noted plans to withdraw care per family, agree with the plan    Isaias Duarte MD  [facility]  Office: [phone]  Fax:    [phone]

## 2021-01-05 NOTE — PROGRESS NOTES
Pulmonary & Critical Care Inpatient Progress Note   Crystal Singh MD     REASON FOR TODAY'S VISIT:  Critical care management    SUBJECTIVE:   Remains on vent support  Refractory hypotension requiring multiple vasopressor agents   Net positive nearly 8L with low urine output     Scheduled Meds:   mupirocin   Nasal BID    chlorhexidine  15 mL Mouth/Throat BID    carboxymethylcellulose PF  1 drop Both Eyes 6 times per day    insulin lispro  0-18 Units Subcutaneous 6 times per day    albumin human  25 g Intravenous Q6H    insulin glargine  10 Units Subcutaneous Nightly    fat emulsion  250 mL Intravenous Once per day on Mon Thu    potassium chloride  40 mEq Intravenous Once    meropenem  1 g Intravenous Q12H    sodium chloride flush  10 mL Intravenous 2 times per day    pantoprazole  40 mg Intravenous Daily    heparin (porcine)  5,000 Units Subcutaneous 3 times per day       Continuous Infusions:   PN-Adult Premix 5/20 - Standard Electrolytes - Central Line 65 mL/hr at 01/04/21 1848    vasopressin (Septic Shock) infusion 0.04 Units/min (01/04/21 1506)    fentaNYL (SUBLIMAZE) infusion 50 mcg/hr (01/04/21 0525)    propofol 30 mcg/kg/min (01/04/21 1637)    EPINEPHrine infusion 8 mcg/min (01/04/21 1850)    prismaSATE BGK 4/2.5 2,000 mL/hr (01/04/21 0400)    prismaSATE BGK 4/2.5 500 mL/hr (01/04/21 0400)    prismaSATE BGK 4/2.5 500 mL/hr (01/04/21 0400)    dextrose      norepinephrine 30 mcg/min (01/04/21 2127)    bupivacaine 0.5%         PRN Meds:  potassium chloride, magnesium sulfate, calcium gluconate **OR** calcium gluconate **OR** calcium gluconate **OR** calcium gluconate, sodium phosphate IVPB **OR** sodium phosphate IVPB **OR** sodium phosphate IVPB **OR** sodium phosphate IVPB, glucose, dextrose, glucagon (rDNA), dextrose, ondansetron, potassium chloride, sodium chloride flush, HYDROmorphone, polyethylene glycol, acetaminophen **OR** acetaminophen, prochlorperazine    ALLERGIES: Patient has No Known Allergies. Objective:   PHYSICAL EXAM:  BP (!) 164/94   Pulse 79   Temp 96.8 °F (36 °C) (Rectal)   Resp 16   Ht 5' 4\" (1.626 m)   Wt 227 lb 15.3 oz (103.4 kg)   SpO2 99%   BMI 39.13 kg/m²    Physical Exam  Constitutional:       General: She is not in acute distress. Appearance: She is well-developed. She is not diaphoretic. HENT:      Head: Normocephalic and atraumatic. Mouth/Throat:      Pharynx: No oropharyngeal exudate. Eyes:      Pupils: Pupils are equal, round, and reactive to light. Neck:      Musculoskeletal: Neck supple. Vascular: No JVD. Cardiovascular:      Heart sounds: Normal heart sounds. No murmur. No friction rub. No gallop. Pulmonary:      Effort: Pulmonary effort is normal.      Breath sounds: No wheezing or rales. Abdominal:      General: Bowel sounds are normal. There is no distension. Palpations: Abdomen is soft. Tenderness: There is no abdominal tenderness. Lymphadenopathy:      Cervical: No cervical adenopathy. Skin:     General: Skin is warm and dry. Findings: No rash. Neurological:      Mental Status: She is alert. Cranial Nerves: Cranial nerve deficit present. Comments: Intubated, sedated            Data Reviewed:   LABS:  CBC:  Recent Labs     01/02/21  0430 01/03/21  0538 01/03/21  1337 01/03/21  1629 01/04/21  0515   WBC 19.7* 18.6*  --   --  17.3*   HGB 10.2* 10.1* 10.6* 10.3* 9.6*   HCT 31.4* 30.8*  --   --  29.6*   MCV 83.7 83.7  --   --  84.9    305  --   --  206     BMP:  Recent Labs     01/04/21  0515 01/04/21  1330 01/04/21  1813   * 135* 136   K 3.7 3.5 3.5   CL 97* 97* 102   CO2 28 28 26   PHOS 1.9* 2.6 1.9*   BUN 45* 33* 32*   CREATININE 1.6* 1.3* 1.0     LIVER PROFILE:   Recent Labs     01/03/21  0538   AST 15   ALT 6*   BILITOT 0.3   ALKPHOS 76     PT/INR:No results for input(s): PROTIME, INR in the last 72 hours. APTT: No results for input(s): APTT in the last 72 hours. UA:No results for input(s): NITRITE, COLORU, PHUR, LABCAST, WBCUA, RBCUA, MUCUS, TRICHOMONAS, YEAST, BACTERIA, CLARITYU, SPECGRAV, LEUKOCYTESUR, UROBILINOGEN, BILIRUBINUR, BLOODU, GLUCOSEU, AMORPHOUS in the last 72 hours. Invalid input(s): [address]     01/03/21  1629 01/03/21  1945   PHART 7.595* 7.583*   AZN9HDE 45.8* 32.9*   PO2ART 595.3* 228.9*       Vent Information  $Ventilation: $Subsequent Day  Skin Assessment: Clean, dry, & intact  Equipment Changed: HME  Vent Type: 840  Vent Mode: AC/PC  Vt Ordered: 500 mL  Pressure Ordered: 20  Rate Set: 16 bmp  Pressure Support: 0 cmH20  FiO2 : 60 %  SpO2: 99 %  SpO2/FiO2 ratio: 165  Sensitivity: 3  PEEP/CPAP: 7  I Time/ I Time %: 1.1 s  Humidification Source: HME    CXR personally reviewed, good placement of lines           Assessment:     1. Acute vent dependence   -airway protection  2. Septic shock  3. CHELSEA  4. Large bowel obstruction s/p surgical intervention   -colon adeno   5. Acute encephalopathy, metabolic     Plan:      -Vent support for now  -Titrate sedation as needed  -Titrate vasopressors for MAP>65  -CRRT per nephro  -Empiric atb  -Scheduled albumin  -Nutritional support per surgical team discretion  -Serial labs, chest imaging   -ICU level of care, guarded prognosis     Due to life threatening hemodynamic instability, renal failure, vent dependence this patient is critically ill.  Total critical care time involved her care was 90 mins     Vanesa Mcclain MD

## 2021-01-05 NOTE — PROGRESS NOTES
Pulmonary & Critical Care Inpatient Progress Note   Vanesa Mcclain MD     REASON FOR TODAY'S VISIT:  Critical care management    SUBJECTIVE:   Remains on vent support, sedation stopped and she remains minimally responsive   Refractory hypotension requiring multiple vasopressor agents   Strongly net positive fluid balance even with CRRT now held     Scheduled Meds:   insulin glargine  20 Units Subcutaneous Nightly    mupirocin   Nasal BID    chlorhexidine  15 mL Mouth/Throat BID    carboxymethylcellulose PF  1 drop Both Eyes 6 times per day    insulin lispro  0-18 Units Subcutaneous 6 times per day    meropenem  1 g Intravenous Q12H    sodium chloride flush  10 mL Intravenous 2 times per day    pantoprazole  40 mg Intravenous Daily    heparin (porcine)  5,000 Units Subcutaneous 3 times per day       Continuous Infusions:   PN-Adult Premix 5/20 - Standard Electrolytes - Central Line      PN-Adult Premix 5/20 - Standard Electrolytes - Central Line 65 mL/hr at 01/04/21 1848    vasopressin (Septic Shock) infusion 0.04 Units/min (01/05/21 1046)    fentaNYL (SUBLIMAZE) infusion 5 mcg/hr (01/05/21 0245)    propofol 30 mcg/kg/min (01/05/21 0224)    EPINEPHrine infusion 6 mcg/min (01/05/21 0627)    prismaSATE BGK 4/2.5 2,000 mL/hr (01/04/21 0400)    prismaSATE BGK 4/2.5 500 mL/hr (01/04/21 0400)    prismaSATE BGK 4/2.5 500 mL/hr (01/04/21 0400)    dextrose      norepinephrine 22 mcg/min (01/05/21 0746)    bupivacaine 0.5%         PRN Meds:  potassium chloride, magnesium sulfate, calcium gluconate **OR** calcium gluconate **OR** calcium gluconate **OR** calcium gluconate, sodium phosphate IVPB **OR** sodium phosphate IVPB **OR** sodium phosphate IVPB **OR** sodium phosphate IVPB, glucose, dextrose, glucagon (rDNA), dextrose, ondansetron, sodium chloride flush, HYDROmorphone, polyethylene glycol, acetaminophen **OR** acetaminophen, prochlorperazine    ALLERGIES:  Patient has No Known Allergies.     Objective: PHYSICAL EXAM:  BP (!) 164/94   Pulse 85   Temp 97.1 °F (36.2 °C) (Rectal)   Resp 24   Ht 5' 4\" (1.626 m)   Wt 231 lb 4.2 oz (104.9 kg)   SpO2 96%   BMI 39.70 kg/m²    Physical Exam  Constitutional:       General: She is not in acute distress. Appearance: She is well-developed. She is not diaphoretic. HENT:      Head: Normocephalic and atraumatic. Mouth/Throat:      Pharynx: No oropharyngeal exudate. Eyes:      Pupils: Pupils are equal, round, and reactive to light. Neck:      Musculoskeletal: Neck supple. Vascular: No JVD. Cardiovascular:      Heart sounds: Normal heart sounds. No murmur. No friction rub. No gallop. Pulmonary:      Effort: Pulmonary effort is normal.      Breath sounds: No wheezing or rales. Abdominal:      General: Bowel sounds are normal. There is no distension. Palpations: Abdomen is soft. Tenderness: There is no abdominal tenderness. Lymphadenopathy:      Cervical: No cervical adenopathy. Skin:     General: Skin is warm and dry. Findings: No rash. Neurological:      Mental Status: She is alert. Cranial Nerves: Cranial nerve deficit present. Comments: Intubated, sedated            Data Reviewed:   LABS:  CBC:  Recent Labs     01/03/21  0538 01/03/21  0538 01/03/21  1629 01/04/21  0515 01/05/21  0524   WBC 18.6*  --   --  17.3* 15.4*   HGB 10.1*   < > 10.3* 9.6* 7.1*   HCT 30.8*  --   --  29.6* 21.2*   MCV 83.7  --   --  84.9 84.6     --   --  206 128*    < > = values in this interval not displayed. BMP:  Recent Labs     01/05/21  0035 01/05/21  0524 01/05/21  1215   * 130* 134*   K 4.4 5.1 4.1   CL 98* 96* 98*   CO2 25 24 28   PHOS 2.2* 3.2 3.1   BUN 25* 21* 24*   CREATININE 0.9 0.8 0.9     LIVER PROFILE:   Recent Labs     01/03/21  0538   AST 15   ALT 6*   BILITOT 0.3   ALKPHOS 76     PT/INR:No results for input(s): PROTIME, INR in the last 72 hours. APTT: No results for input(s): APTT in the last 72 hours. UA:No results for input(s): NITRITE, COLORU, PHUR, LABCAST, WBCUA, RBCUA, MUCUS, TRICHOMONAS, YEAST, BACTERIA, CLARITYU, SPECGRAV, LEUKOCYTESUR, UROBILINOGEN, BILIRUBINUR, BLOODU, GLUCOSEU, AMORPHOUS in the last 72 hours. Invalid input(s): Carmen Barber  Recent Labs     01/05/21  0523 01/05/21  1217   PHART 7.524* 7.429   TNM0XDX 33.1* 42.2   PO2ART 306.8* 117.0*       Vent Information  $Ventilation: $Subsequent Day  Skin Assessment: Clean, dry, & intact  Equipment Changed: HME  Vent Type: 840  Vent Mode: CPAP  Vt Ordered: 500 mL  Pressure Ordered: 20  Rate Set: 16 bmp  Pressure Support: 10 cmH20  FiO2 : 40 %  SpO2: 96 %  SpO2/FiO2 ratio: 240  Sensitivity: 3  PEEP/CPAP: 5  I Time/ I Time %: 1.1 s  Humidification Source: HME    CXR personally reviewed, good placement of lines           Assessment:     1. Acute vent dependence   -airway protection  2. Septic shock  3. CHELSEA  4. Large bowel obstruction s/p surgical intervention   -colon adeno   5. Acute encephalopathy, sedation and metabolic    Plan:      -Vent support for now but will work on SBT/liberation if mentation allows   -Titrate off sedation today   -Titrate vasopressors for MAP>65  -CRRT per nephro but can stop for now; her renal function seems to be improving based on urine output   -Empiric atb  -Scheduled albumin  -Nutritional support per surgical team discretion, on TPN  -Serial labs, chest imaging   -ICU level of care, guarded prognosis. Palliative care involved. Family planning to see her today and determine further goals of care     Due to life threatening hemodynamic instability, renal failure, vent dependence this patient is critically ill.  Total critical care time involved her care was 90 mins     Kailash Alvarez MD

## 2021-01-05 NOTE — PROGRESS NOTES
01/04/21 1947   Vent Information   Vent Type 840   Vent Mode AC/PC   Pressure Ordered 20   Rate Set 16 bmp   Pressure Support 0 cmH20   FiO2  60 %   SpO2 99 %   SpO2/FiO2 ratio 165   Sensitivity 3   PEEP/CPAP 7   I Time/ I Time % 1.1 s   Humidification Source HME   Vent Patient Data   High Peep/I Pressure 20   Peak Inspiratory Pressure 28 cmH2O   Mean Airway Pressure 14 cmH20   Rate Measured 16 br/min   Vt Exhaled 772 mL   Minute Volume 12.3 Liters   I:E Ratio 1:2.40   Spontaneous Breathing Trial (SBT) RT Doc   Pulse 79   Breath Sounds   Right Upper Lobe Clear   Right Middle Lobe Clear   Right Lower Lobe Diminished   Left Upper Lobe Clear   Left Lower Lobe Diminished   Additional Respiratory  Assessments   Resp 16   Alarm Settings   High Pressure Alarm 40 cmH2O   Low Minute Volume Alarm 3 L/min   High Respiratory Rate 40 br/min   Low Exhaled Vt  300 mL   ETT (adult)   Placement Date/Time: 01/03/21 1600   Timeout: Patient;Procedure;Site/Side;Appropriate Equipment  Type: Cuffed  Tube Size: 7 mm  Location: Oral  Secured at: 22 cm  Placed By: In surgery  Measured From: Lips   Secured at 22 cm   Measured From [address] [place] [address],[address] By [employer] tube weber   Site Condition Dry

## 2021-01-05 NOTE — PROGRESS NOTES
Hospitalist Progress Note      PCP: Ella Logan MD    Date of Admission: 12/27/2020    Chief Complaint: Constipation, abdominal pain, n/v    Hospital Course: 24-year-old CF p/w colonic obstruction secondary to transverse colon mass for which she underwent  left colectomy with anastomosis and mobilization of splenic flexure on 12/27/2020 for obstructing transverse colon mass . Developed postoperative ileus with some pneumoperitoneum on 1/1/2021 requiring to be transferred to ICU for further evaluation and management. Had CT abdomen pelvis on 1/1/2021 which showed possible postoperative  surgical changes  but no focal fluid collections suggestive of abscess . General surgery advised NG tube and LIS. Patient also had sepsis of unclear source for which she was empirically started on IV Merrem after obtaining blood cultures. Subjective: Patient continue to be critical with Mx pressor support and currently intubated and on sedation. Currently on maximum pressor support with 3 pressors including epinephrine, Levophed, vasopressin gtt. on CRRT since 1/4/21  . General surgery following.          Medications:  Reviewed    Infusion Medications    PN-Adult Premix 5/20 - Standard Electrolytes - Central Line      PN-Adult Premix 5/20 - Standard Electrolytes - Central Line 65 mL/hr at 01/04/21 1848    vasopressin (Septic Shock) infusion 0.04 Units/min (01/05/21 1046)    fentaNYL (SUBLIMAZE) infusion 5 mcg/hr (01/05/21 0245)    propofol 30 mcg/kg/min (01/05/21 0224)    EPINEPHrine infusion 6 mcg/min (01/05/21 0627)    prismaSATE BGK 4/2.5 2,000 mL/hr (01/04/21 0400)    prismaSATE BGK 4/2.5 500 mL/hr (01/04/21 0400)    prismaSATE BGK 4/2.5 500 mL/hr (01/04/21 0400)    dextrose      norepinephrine 22 mcg/min (01/05/21 0746)    bupivacaine 0.5%       Scheduled Medications    insulin glargine  20 Units Subcutaneous Nightly    mupirocin   Nasal BID    chlorhexidine  15 mL Mouth/Throat BID  carboxymethylcellulose PF  1 drop Both Eyes 6 times per day    insulin lispro  0-18 Units Subcutaneous 6 times per day    meropenem  1 g Intravenous Q12H    sodium chloride flush  10 mL Intravenous 2 times per day    pantoprazole  40 mg Intravenous Daily    heparin (porcine)  5,000 Units Subcutaneous 3 times per day     PRN Meds: potassium chloride, magnesium sulfate, calcium gluconate **OR** calcium gluconate **OR** calcium gluconate **OR** calcium gluconate, sodium phosphate IVPB **OR** sodium phosphate IVPB **OR** sodium phosphate IVPB **OR** sodium phosphate IVPB, glucose, dextrose, glucagon (rDNA), dextrose, ondansetron, sodium chloride flush, HYDROmorphone, polyethylene glycol, acetaminophen **OR** acetaminophen, prochlorperazine      Intake/Output Summary (Last 24 hours) at 1/5/2021 1338  Last data filed at 1/4/2021 2220  Gross per 24 hour   Intake 256 ml   Output    Net 256 ml       Physical Exam Performed:  BP (!) 164/94   Pulse 85   Temp 97.1 °F (36.2 °C) (Rectal)   Resp 24   Ht 5' 4\" (1.626 m)   Wt 231 lb 4.2 oz (104.9 kg)   SpO2 96%   BMI 39.70 kg/m²     General appearance: Intubated and on ventilator, sedated . NG to LIS  Respiratory intubated and on ventilator  Cardiovascular: Regular rate and rhythm with normal S1/S2 without murmurs, rubs or gallops. Abdomen: hypoactive bowel sounds, soft, non-distended, tenderness noted appropriate, onQ ball in place   Musculoskeletal: No clubbing, cyanosis or edema bilaterally.  Full range of motion without deformity. Skin: Skin color, texture, turgor normal.  No rashes or lesions. Neurologic: Intubated and sedated  Psychiatric: intubated and sedated      Labs:   Recent Labs     01/03/21  0538 01/03/21  0538 01/03/21  1629 01/04/21  0515 01/05/21  0524   WBC 18.6*  --   --  17.3* 15.4*   HGB 10.1*   < > 10.3* 9.6* 7.1*   HCT 30.8*  --   --  29.6* 21.2*     --   --  206 128*    < > = values in this interval not displayed.      Recent Labs 01/05/21  0035 01/05/21  0524 01/05/21  1215   * 130* 134*   K 4.4 5.1 4.1   CL 98* 96* 98*   CO2 25 24 28   BUN 25* 21* 24*   CREATININE 0.9 0.8 0.9   CALCIUM 7.8* 7.6* 8.0*   PHOS 2.2* 3.2 3.1     Urinalysis:    Lab Results   Component Value Date    NITRU Negative 12/27/2020    BLOODU Negative 12/27/2020    SPECGRAV 1.020 12/27/2020    GLUCOSEU Negative 12/27/2020       Radiology:  XR CHEST PORTABLE   Final Result   Right-sided Vas-Cath with tip projecting in the region of the lower SVC. CT ABDOMEN PELVIS WO CONTRAST Additional Contrast? None   Final Result   Nasogastric tube is in normal position. Colocolonic anastomosis of the transverse colon is noted. There is no   adjacent fluid collection. Stable small to moderate amount of pneumoperitoneum. Free fluid is slightly increased, in the perihepatic and right lower quadrant   intermittent mesenteric spaces. Free fluid in pelvis is unchanged. The   degree of fluid is difficult to quantitate. No abscess collection is evident. Nonspecific pattern with dilation of small bowel loops in the right lower. Other small bowel loops are less dilated in the interval.      Small bilateral pleural effusions. Increased bibasilar atelectasis. Pneumonia differential concern. XR CHEST PORTABLE   Final Result   Right PICC tip near the superior atrial caval junction, in good position. Enteric tube tip likely in the body of the stomach. Intraperitoneal free air seen on comparison chest radiograph is not well   evaluated. Low lung volume with bibasilar subsegmental atelectasis. IR PICC WO SQ PORT/PUMP > 5 YEARS   Final Result   Successful placement of PICC line. CT ABDOMEN PELVIS WO CONTRAST Additional Contrast? None   Final Result   Postsurgical changes from partial colon resection. Small amount of free   fluid within the pelvis. No defined pelvic or abdominal collection. Small amount of free air within the abdomen. Findings may be postsurgical   given recent procedure. Dilated loops of small bowel without a discrete transition point noted to   suggest high-grade obstruction findings may represent ileus. Very small bilateral pleural effusions and atelectasis in the lower lobes. XR CHEST PORTABLE   Final Result   Pneumoperitoneum is now appreciated. This is favored to be postoperative,   noting laparotomy clips on 12/30/2020. Clinical correlation is recommended. Bibasilar atelectasis. XR ACUTE ABD SERIES CHEST 1 VW   Final Result   Minimal left basilar atelectasis. Interval postsurgical changes noted over   the mid abdomen with prominent appearing bowel loops likely indicative of   ileus versus developing partial small bowel obstruction. No   pneumoperitoneum. Recommend comparison with clinical exam and short-term   interval follow-up study. Interval removal of the feeding tube. XR ABDOMEN (KUB) (SINGLE AP VIEW)   Final Result   Gastric tube in the stomach         CT ABDOMEN PELVIS WO CONTRAST Additional Contrast? None   Final Result   1. 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 There is at least partial bowel obstruction at the level of the lesion with   proximal colonic and small bowel distension. The lesion is best visualized   on series 601, image 38.   2. No other acute findings within the abdomen or pelvis. 3. Status post cholecystectomy and hysterectomy.                Active Hospital Problems    Diagnosis Date Noted    Acute postoperative pulmonary insufficiency ([address] [J95.2] 01/03/2021    S/P partial colectomy [Z90.49] 01/03/2021    Hyperglycemia [R73.9] 01/03/2021    Abnormal CT of the abdomen [R93.5]     Septic shock ([address] [A41.9, R65.21] 01/02/2021    SIRS (systemic inflammatory response syndrome) ([address] [R65.10] 01/01/2021    Bandemia [H96.102] 01/01/2021  Acute respiratory distress [R06.03] 01/01/2021    Lactic acidosis [E87.2] 01/01/2021    Pneumoperitoneum [K66.8] 01/01/2021    Acute-on-chronic kidney injury ([facility] [N17.9, N18.9] 01/01/2021    Atelectasis [J98.11] 01/01/2021    Small bowel obstruction ([facility] [K56.609] 12/27/2020    Class 2 obesity in adult [E66.9] 02/05/2018     Assessment/Plan:  Invasive colonic adenocarcinoma  complicated by large bowel obstruction POA  - surgery consulted - S/p left colectomy with anastomosis and mobilization of splenic flexure   for obstructing transverse colon mass on 12/28/20 .    - NG tube d/consuelo on 12/29/20. General surgery started on clear liquid diet . Patient developed postoperative ileus on 12/31/2020 . Placed back n.p.o. follow-up x-ray on 1/1/2021 suggestive of mild pneumoperitoneum.   - CT abdomen pelvis w/o contrast on 1/1/'21  -showed mild pneumoperitoneum likely related to post surgical changes but no focal fluid collection suggestive of abscess. General surgery following and placed back and NG-tube on 1/1/2021 and connected to LIS ; On 1/3/21 patient had a follow-up CT abdomen pelvis which showed persistent pneumoperitoneum with worsening hemodynamics -  general surgery decided to take her to the OR. Noted anastomotic leak s/p partial colectomy and colostomy done on 1/3/2021  -Continue pain control.  -Oncology Consulted - Added MSI (micro satellite instability) screening and recommended outpatient follow-up    Septic shock  -noted on 1/1/2021. Likely from anastomotic leak and peritonitis -likely GI/ source in the postoperative setting . Requiring MAX pressor support (currently requiring epinephrine, norepinephrine, vasopressin gtt.) .   -As evidenced by significant leukocytosis 29K, elevated lactic 2.5; hypotension, tachycardia  -Received fluid bolus and transferred patient to ICU for further closer monitoring on 1/1/2021  -Critical care  Following and assisting with Mx . -blood cultures x2 on 1/1/2021 NGTD, empirically started on IV Merrem (s on 1/1/21) .  CHELSEA due to dehydration from above, in the setting of CKD3  (Baseline Cr is perhaps 1.4.)  -initially improved and then worsened. Likely due to ischemic ATN . -Admission creatinine 2.6;  Improved with  IVFs to 1.2  and started worsening again since 1/1/2021 . Creatinine peaked at 3.1  . Nephrology consulted and started on CRRT since 1/3/2021     Hypovolemic hyponatremia  POA - Na 128 on admission - Resolved   -Currently hypernatremic. IV hydration and monitor.     HTN  - held HCTZ while on IVFs. - continued bisoprolol (metoprolol here) and amlodipine ; Held again on 1/1/2021 given hypotension      HLD  - statin    Postoperative respiratory failure -currently intubated on ventilation since 1/3/2021. Pulmonology assisting with vent management    DVT Prophylaxis: Lovenox   Diet: Diet NPO Effective Now Exceptions are: Ice Chips, Sips with Meds  PN-Adult Premix 5/20 - Standard Electrolytes - Central Line  PN-Adult Premix 5/20 - Standard Electrolytes - Central Line  Code Status: Limited    PT/OT Eval Status: evaluated on 12/29/2020 with recommendations to home with home care    Dispo : Unclear ;  Patient condition is critical and prognosis is guarded at best . Will follow      The note was completed using Dragon -speech recognition software & EMR  . Every effort was made to ensure accuracy; however, inadvertent computerized transcription errors may be present. Vilma Haas MD     Addendum at [id number]  -Discussed with critical care NP Shonna Martino) . She reports that she spoke with family, who opted for comfort care. CODE STATUS amended. DC'd pressor support. Daughters x2 at bedside. Continue Comfort care at this time .  Will follow     Vilma Haas MD  Hospitalist Physician

## 2021-01-05 NOTE — PLAN OF CARE
Palliative Care Progress Note  Palliative Care Admit date:  1/4/21    Advance Directives:  Leonora Lainez has expressed her intention to amend code status and, once EET is removed, she and family do not want pt re-intubated. Plan of care/goals:  Spoke w/ Ivett after Pulm NP and she was appreciative of the medical update. Ivett shared that she and family have engaged in ongoing discussions and she anticipates pts plan of care will shift to one of comfort to allow natural death. Social/Spiritual:  Leonora Lainez is very tearful and stresses the need to be w/ pt @ EOL. She has updated extended family (pts siblings) as to pts condition & all are very supportive if Ivett should eventually decide for comfort. Plan:  Ivett and her dtr wish to come to [facility] for an EOL visit.             Reason for consult:   _X_ Advance Care Planning  ___ Transition of Care Planning  _X_ Psychosocial/Spiritual Support  ___ Symptom Management                                                                                                                                      Aniyah Golden RN

## 2021-01-05 NOTE — PROGRESS NOTES
Received bedside report from off going RN. Pt currently sedated and on vent. Pts unable to tolerate large turns BP drops to critical low. picc infusing. Jj with low urine output. [employer] in [facility]. Vas Cath in place and CRRT currently running. Orders verified. Pt unresponsive, and unable to follow commands. Call light in reach, bed in lowest position, will continue to monitor.

## 2021-01-06 NOTE — PROGRESS NOTES
Patient sats and HR dropped, writer went to bedside, quickly went asystole on the monitor. No Heart or lung sounds heard on auscultation. TOD pronounced at 12:37 by Matilde Cruz and Aníbal Morales RN. Willie Urias at bedside with patient's daughter Cristiane Hurtado and granddaughter.

## 2021-01-06 NOTE — CARE COORDINATION
Writer updated by Coral Caballero NP of need to contact [facility]. Call placed and referral sent to [address]. Notified Jose Thrasher of the above.  Sarah Acevedo RN

## 2021-01-06 NOTE — PROGRESS NOTES
Pulmonary & Critical Care Inpatient Progress Note   Blanka Lockett MD     REASON FOR TODAY'S VISIT:  Critical care management    SUBJECTIVE:   Now comfort care, palliative involved  Was extubated    Scheduled Meds:   sodium chloride flush  10 mL Intravenous 2 times per day       Continuous Infusions:      PRN Meds:  morphine, LORazepam, sodium chloride flush    ALLERGIES:  Patient has No Known Allergies. Objective:   PHYSICAL EXAM:  BP (!) 59/33   Pulse 70   Temp 99.9 °F (37.7 °C) (Axillary)   Resp 19   Ht 5' 4\" (1.626 m)   Wt 231 lb 4.2 oz (104.9 kg)   SpO2 98%   BMI 39.70 kg/m²    Physical Exam  Constitutional:       General: She is not in acute distress. Appearance: She is well-developed. She is not diaphoretic. HENT:      Head: Normocephalic and atraumatic. Mouth/Throat:      Pharynx: No oropharyngeal exudate. Eyes:      Pupils: Pupils are equal, round, and reactive to light. Neck:      Musculoskeletal: Neck supple. Vascular: No JVD. Cardiovascular:      Heart sounds: Normal heart sounds. No murmur. No friction rub. No gallop. Pulmonary:      Effort: Pulmonary effort is normal.      Breath sounds: No wheezing or rales. Abdominal:      General: Bowel sounds are normal. There is no distension. Palpations: Abdomen is soft. Tenderness: There is no abdominal tenderness. Lymphadenopathy:      Cervical: No cervical adenopathy. Skin:     General: Skin is warm and dry. Findings: No rash. Neurological:      Mental Status: She is alert. Cranial Nerves: Cranial nerve deficit present.       Comments: Intubated, sedated            Data Reviewed:   LABS:  CBC:  Recent Labs     01/03/21  1629 01/04/21  0515 01/05/21  0524   WBC  --  17.3* 15.4*   HGB 10.3* 9.6* 7.1*   HCT  --  29.6* 21.2*   MCV  --  84.9 84.6   PLT  --  206 128*     BMP:  Recent Labs     01/05/21  0035 01/05/21  0524 01/05/21  1215   * 130* 134*   K 4.4 5.1 4.1   CL 98* 96* 98*   CO2 25 24 28 PHOS 2.2* 3.2 3.1   BUN 25* 21* 24*   CREATININE 0.9 0.8 0.9     LIVER PROFILE:   No results for input(s): AST, ALT, LIPASE, ALB, BILIDIR, BILITOT, ALKPHOS in the last 72 hours. Invalid input(s): AMYLASE  PT/INR:No results for input(s): PROTIME, INR in the last 72 hours. APTT: No results for input(s): APTT in the last 72 hours. UA:No results for input(s): NITRITE, COLORU, PHUR, LABCAST, WBCUA, RBCUA, MUCUS, TRICHOMONAS, YEAST, BACTERIA, CLARITYU, SPECGRAV, LEUKOCYTESUR, UROBILINOGEN, BILIRUBINUR, BLOODU, GLUCOSEU, AMORPHOUS in the last 72 hours. Invalid input(s): Alverto Olivares  Recent Labs     01/05/21  0523 01/05/21  1217   PHART 7.524* 7.429   KUR9ZST 33.1* 42.2   PO2ART 306.8* 117.0*           CXR personally reviewed, good placement of lines           Assessment:     1. Acute vent dependence   -airway protection  2. Septic shock  3. CHELSEA  4. Large bowel obstruction s/p surgical intervention   -colon adeno   5.  Acute encephalopathy, sedation and metabolic    Plan:      -Now comfort care with palliative care involved  -Anticipate death soon  -Will sign off please contact if needed    Tanisha Wayne MD

## 2021-01-09 NOTE — DISCHARGE SUMMARY
Hospital Medicine Discharge (Death ) Summary    Patient ID: Beryl Chau      Patient's PCP: Haleigh Johnson MD    Admit Date: 12/27/2020     Discharge Date: 1/6/2021     Admitting Physician: Eron Osuna MD     Discharge Physician: Maria Alejandra Anton MD     Discharge Diagnoses: Active Hospital Problems    Diagnosis    Acute postoperative pulmonary insufficiency ([address] [J95.2]    S/P partial colectomy [Z90.49]    Hyperglycemia [R73.9]    Abnormal CT of the abdomen [R93.5]    Septic shock ([address] [A41.9, R65.21]    SIRS (systemic inflammatory response syndrome) ([address] [R65.10]    Bandemia [D72.825]    Acute respiratory distress [R06.03]    Lactic acidosis [E87.2]    Pneumoperitoneum [K66.8]    Acute-on-chronic kidney injury ([address] [N17.9, N18.9]    Atelectasis [J98.11]    Small bowel obstruction ([address] [K56.609]    Class 2 obesity in adult [E66.9]       The patient was seen and examined on day of discharge and this discharge summary is in conjunction with any daily progress note from day of discharge. Hospital Course: 72-year-old CF p/w colonic obstruction secondary to transverse colon mass for which she underwent  left colectomy with anastomosis and mobilization of splenic flexure on 2020 for obstructing transverse colon mass . Developed postoperative ileus with some pneumoperitoneum on 2021 requiring to be transferred to ICU for further evaluation and management. Had CT abdomen pelvis on 2021 which showed possible postoperative  surgical changes  but no focal fluid collections suggestive of abscess . General surgery advised NG tube and LIS. Patient also had sepsis of unclear source for which she was empirically started on IV Merrem after obtaining blood cultures. Patient's condition continued to decline and general surgery took her to the OR on 1/3/2021 and patient underwent partial colectomy and colostomy for anastomotic leak. Subsequently patient condition continued to deteriorate and family opted for withdrawal of care. [facility] consulted. Date of Death: 2021  Time of Death: 1237    Immediate Cause of Death: Septic shock   underlying Conditions: Individual colonic adenocarcinoma   other Contributing Conditions : CHELSEA on CKD stage III , postoperative respiratory failure    Physical Exam Performed:   BP (!) 59/33   Pulse 70   Temp 99.9 °F (37.7 °C) (Axillary)   Resp 19   Ht 5' 4\" (1.626 m)   Wt 231 lb 4.2 oz (104.9 kg)   SpO2 98%   BMI 39.70 kg/m²         Labs:  For convenience and continuity at follow-up the following most recent labs are provided:      CBC:    Lab Results   Component Value Date    WBC 15.4 2021    HGB 7.1 2021    HCT 21.2 2021     2021       Renal:    Lab Results   Component Value Date     2021    K 4.1 2021    K 3.7 2021    CL 98 2021    CO2 28 2021    BUN 24 2021    CREATININE 0.9 2021    CALCIUM 8.0 2021    PHOS 3.1 2021 Significant Diagnostic Studies    Radiology:   XR CHEST PORTABLE   Final Result   Right-sided Vas-Cath with tip projecting in the region of the lower SVC. CT ABDOMEN PELVIS WO CONTRAST Additional Contrast? None   Final Result   Nasogastric tube is in normal position. Colocolonic anastomosis of the transverse colon is noted. There is no   adjacent fluid collection. Stable small to moderate amount of pneumoperitoneum. Free fluid is slightly increased, in the perihepatic and right lower quadrant   intermittent mesenteric spaces. Free fluid in pelvis is unchanged. The   degree of fluid is difficult to quantitate. No abscess collection is evident. Nonspecific pattern with dilation of small bowel loops in the right lower. Other small bowel loops are less dilated in the interval.      Small bilateral pleural effusions. Increased bibasilar atelectasis. Pneumonia differential concern. XR CHEST PORTABLE   Final Result   Right PICC tip near the superior atrial caval junction, in good position. Enteric tube tip likely in the body of the stomach. Intraperitoneal free air seen on comparison chest radiograph is not well   evaluated. Low lung volume with bibasilar subsegmental atelectasis. IR PICC WO SQ PORT/PUMP > 5 YEARS   Final Result   Successful placement of PICC line. CT ABDOMEN PELVIS WO CONTRAST Additional Contrast? None   Final Result   Postsurgical changes from partial colon resection. Small amount of free   fluid within the pelvis. No defined pelvic or abdominal collection. Small amount of free air within the abdomen. Findings may be postsurgical   given recent procedure. Dilated loops of small bowel without a discrete transition point noted to   suggest high-grade obstruction findings may represent ileus. Very small bilateral pleural effusions and atelectasis in the lower lobes.          XR CHEST PORTABLE   Final Result

## 2021-09-28 NOTE — CONSULTS
Department of General Surgery Consult    PATIENT NAME: Giuliana Martinez   YOB: 1940    ADMISSION DATE: 12/27/2020  2:03 PM      TODAY'S DATE: 12/27/2020    Reason for Consult: Colon obstruction    Chief Complaint: Abdominal pain with nausea and vomiting    Requesting Physician: Effie    HISTORY OF PRESENT ILLNESS:              The patient is a 68 y.o. female who presents with abdominal pain with nausea and vomiting. This started on Tuesday. She states she has been bloated and cannot keep anything down. She has been passing a little bit of gas, but has not had a significant bowel movement. She feels a lot of rumbling in her abdomen. The pain is a dull ache that is exacerbated with eating and vomiting. She has never had pain like this before. She has never had a colonoscopy. Past Medical History:        Diagnosis Date    CKD (chronic kidney disease) stage 3, GFR 30-59 ml/min     Hypertension        Past Surgical History:    History reviewed. No pertinent surgical history. Current Medications:   No current facility-administered medications for this encounter. Prior to Admission medications    Medication Sig Start Date End Date Taking?  Authorizing Provider   potassium chloride (KLOR-CON M) 10 MEQ extended release tablet Take 10 mEq by mouth daily    Historical Provider, MD   pravastatin (PRAVACHOL) 80 MG tablet Take 80 mg by mouth daily    Historical Provider, MD   omeprazole (PRILOSEC) 20 MG delayed release capsule Take 20 mg by mouth daily    Historical Provider, MD   hydrochlorothiazide (HYDRODIURIL) 25 MG tablet Take 25 mg by mouth daily    Historical Provider, MD   bisoprolol (ZEBETA) 5 MG tablet Take 2.5 mg by mouth daily    Historical Provider, MD   amLODIPine (NORVASC) 10 MG tablet Take 10 mg by mouth daily    Historical Provider, MD   cyanocobalamin 1000 MCG tablet Take 1,000 mcg by mouth daily    Historical Provider, MD Subjective   Patient ID: Aurelia is a 55 year old female.    Chief Complaint   Patient presents with   • Hospital F/U     Patient exposed to COVID, negative test 9/18/21. Went to [place] ER due to abdomen pain 9/26/21. Had rapid covid test today, negative.   • Gas     Patient c/o bloating.     55-year-old female with past medical history of hypertension who presents in person for hospital follow-up.    Patient was seen with an advocate 9/21 at the urgent care for diarrhea.  Covid testing was negative stool testing was negative    labs visible in care everywhere with [facility].  Lipase was elevated at 125 mild hyponatremia at 132 CBC with mild leukopenia 3.2.  Patient was given sucralfate  Reports no improvement in symptoms     1 month upper abdominal symptoms  Epigastric pain  Worse over last two weeks w/ diarrhea  9/26/21 went to ER  6lb weight loss  Has been eating fruits, soups, noodles  No emesis  No nausea  1x diarrhea, watery   No pattern with foods    COVID-19 test negative today    Social Hx  No alcohol use    FHx  No cancers in the family     HCM  No COVID-19 vaccine    Patient's medications, allergies, past medical, surgical, social and family histories were reviewed and updated as appropriate.    Review of Systems   All other systems reviewed and are negative.    Objective   Physical Exam  Vitals and nursing note reviewed.   Constitutional:       General: She is not in acute distress.     Appearance: Normal appearance.   HENT:      Head: Normocephalic and atraumatic.      Nose: No congestion or rhinorrhea.      Mouth/Throat:      Mouth: Mucous membranes are moist.   Eyes:      General:         Right eye: No discharge.         Left eye: No discharge.      Extraocular Movements: Extraocular movements intact.      Conjunctiva/sclera: Conjunctivae normal.      Pupils: Pupils are equal, round, and reactive to light.   Cardiovascular:      Rate and Rhythm: Normal rate and regular rhythm.      Pulses: Normal pulses.       Heart sounds: Normal heart sounds. No murmur heard.     Pulmonary:      Effort: Pulmonary effort is normal. No respiratory distress.      Breath sounds: Normal breath sounds. No wheezing, rhonchi or rales.   Abdominal:      General: Abdomen is flat. Bowel sounds are normal. There is no distension.      Palpations: Abdomen is soft.      Tenderness: There is no abdominal tenderness. There is no guarding.   Musculoskeletal:         General: No swelling, tenderness or deformity. Normal range of motion.      Cervical back: Normal range of motion and neck supple. No muscular tenderness.      Right lower leg: No edema.      Left lower leg: No edema.   Lymphadenopathy:      Cervical: No cervical adenopathy.   Skin:     General: Skin is warm and dry.      Capillary Refill: Capillary refill takes less than 2 seconds.      Findings: No lesion.   Neurological:      General: No focal deficit present.      Mental Status: She is alert and oriented to person, place, and time. Mental status is at baseline.   Psychiatric:         Mood and Affect: Mood normal.       Visit Vitals  /74 (BP Location: LUE - Left upper extremity, Patient Position: Sitting, Cuff Size: Regular)   Pulse (!) 105   Temp 98.5 °F (36.9 °C) (Temporal)   Resp 16   Wt 69.9 kg (154 lb)   LMP 06/01/2014   SpO2 95%   BMI 25.63 kg/m²     Current Outpatient Medications   Medication Sig Dispense Refill   • sucralfate (CARAFATE) 1 g tablet Take 1 g by mouth.     • amlodipine-benazepril (LOTREL) 5-20 MG per capsule Take 1 capsule by mouth daily. 90 capsule 3   • omeprazole (PriLOSEC) 40 MG capsule Take 1 capsule by mouth daily. 30 capsule 0   • fluticasone (FLONASE) 50 MCG/ACT nasal spray SPRAY ONCE IN EACH NOSTRIL TWICE DAILY       No current facility-administered medications for this visit.       Assessment   Problem List Items Addressed This Visit     None      Visit Diagnoses     Screen for colon cancer    -  Primary    Relevant Orders    SERVICE TO  Multiple Vitamins-Minerals (THERAPEUTIC MULTIVITAMIN-MINERALS) tablet Take 1 tablet by mouth daily    Historical Provider, MD   aspirin 81 MG EC tablet Take 81 mg by mouth daily    Historical Provider, MD   Omega-3 Fatty Acids (FISH OIL) 1000 MG CAPS Take 3,000 mg by mouth 3 times daily    Historical Provider, MD   Calcium Carb-Cholecalciferol (CALCIUM-VITAMIN D) 600-400 MG-UNIT TABS Take 1 tablet by mouth daily    Historical Provider, MD        Allergies:  Patient has no known allergies. Social History:   TOBACCO:   reports that she has never smoked. She has never used smokeless tobacco.  ETOH:   reports no history of alcohol use. DRUGS:   reports no history of drug use. Family History:    History reviewed. No pertinent family history. REVIEW OF SYSTEMS:  CONSTITUTIONAL:  negative  HEENT:  negative  RESPIRATORY:  negative  CARDIOVASCULAR:  negative  GASTROINTESTINAL:  negative except for nausea, vomiting, constipation, abdominal pain and abdominal distention  GENITOURINARY:  negative  HEMATOLOGIC/LYMPHATIC:  negative  NEUROLOGICAL:  Negative  * All other ROS reviewed and negative. PHYSICAL EXAM:  VITALS:  BP (!) 154/44   Pulse 83   Temp 97.5 °F (36.4 °C) (Oral)   Resp 20   Ht 5' 4\" (1.626 m)   Wt 210 lb (95.3 kg)   SpO2 95%   BMI 36.05 kg/m²   24HR INTAKE/OUTPUT:    No intake/output data recorded.   I/O this shift:  In: 2100 [IV Piggyback:2100]  Out: 250 [Urine:250]    CONSTITUTIONAL:  alert, no apparent distress and moderately obese  EYES:  PERRL, sclera clear  ENT:  Normocephalic,atraumatic, without obvious abnormality  NECK:  supple, symmetrical, trachea midline  LUNGS: Resp effort easy and unlabored, clear to auscultation  CARDIOVASCULAR:  NO JVD, regular rate and rhythm and no murmur noted  ABDOMEN:  normal bowel sounds, soft, distended, mild tenderness noted diffusely, voluntary guarding absent, no masses palpated and hernia absent GASTROENTEROLOGY    Abdominal cramping        Relevant Orders    SERVICE TO GASTROENTEROLOGY    Abdominal pain, acute, epigastric        Relevant Medications    omeprazole (PriLOSEC) 40 MG capsule    Other Relevant Orders    SERVICE TO GASTROENTEROLOGY    CBC WITH DIFFERENTIAL (Completed)    COMPREHENSIVE METABOLIC PANEL (Completed)    GLYCOHEMOGLOBIN (Completed)    THYROID STIMULATING HORMONE REFLEX (Completed)    LIPASE (Completed)    US GALLBLADDER    RUQ pain        Relevant Orders    THYROID STIMULATING HORMONE REFLEX (Completed)    LIPASE (Completed)    US GALLBLADDER      Recommend further workup w/ repeat labs  RUQ ultrasound  Mild tachycardia today, importance of hydration discussed  Trial of omeprazole 40mg QD for possible gastritis  Further workup pending  Recommend also seeing GI for EGD and colonoscopy, referral placed  Benefiber also start    Return in 2 weeks to reassess on meds   Precautions for ER discussed   MUSCULOSKELETAL: No clubbing or cyanosis, 1+ pitting edema lower extremities  NEUROLOGIC:  Mental Status Exam:  Level of Alertness:   awake  PSYCHIATRIC:   person, place, time  SKIN:  no bruising or bleeding, normal skin color, texture, turgor and no redness, warmth, or swelling    DATA:    CBC:   Recent Labs     12/27/20  1603   WBC 8.8   HGB 12.4   HCT 36.7        BMP:    Recent Labs     12/27/20  1603   *   K 3.3*   CL 89*   CO2 24   BUN 96*   CREATININE 2.6*   GLUCOSE 115*     Hepatic:   Recent Labs     12/27/20  1603   AST 35   ALT 17   BILITOT 0.4   ALKPHOS 102     Mag:    No results for input(s): MG in the last 72 hours. Phos:   No results for input(s): PHOS in the last 72 hours. INR: No results for input(s): INR in the last 72 hours. Radiology Review: Images personally reviewed by me. 1. 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 There is at least partial bowel obstruction at the level of the lesion with   proximal colonic and small bowel distension.  The lesion is best visualized   on series 601, image 38.   2. No other acute findings within the abdomen or pelvis. 3. Status post cholecystectomy and hysterectomy. IMPRESSION/RECOMMENDATIONS:    Transverse colon mass with obstruction. Admit for IV fluid resuscitation and nasogastric decompression. Plan for extended right colectomy. I explained the procedure including risks, benefits, and alternatives. Questions were answered and the patient agrees to proceed.       Electronically signed by Diana Alaniz MD     [address] [facility]  [id number]

## (undated) DEVICE — SEALER ENDOSCP NANO COAT OPN DIV CRV L JAW LIGASURE IMPACT

## (undated) DEVICE — Device: Brand: SENSURA MIO

## (undated) DEVICE — SUTURE VCRL + SZ 3-0 L18IN ABSRB UD SH 1/2 CIR TAPERCUT NDL VCP864D

## (undated) DEVICE — SUTURE PERMAHAND SZ 2-0 L18IN NONABSORBABLE BLK L26MM SH C012D

## (undated) DEVICE — RELOAD STPL L75MM OPN H3.8MM CLS 1.5MM WIRE DIA0.2MM REG

## (undated) DEVICE — GLOVE,SURG,SENSICARE SLT,LF,PF,7: Brand: MEDLINE

## (undated) DEVICE — SUTURE PDS II SZ 0 L60IN ABSRB VLT L48MM CTX 1/2 CIR Z990G

## (undated) DEVICE — GLOVE,SURG,SENSICARE SLT,LF,PF,7.5: Brand: MEDLINE

## (undated) DEVICE — DRAIN CHN 19FR L0.25IN DIA6.3MM SIL RND HUBLESS FULL FLUT

## (undated) DEVICE — LIDOCAINE HCL JELLY 2% 12 120MG/6ML SYR

## (undated) DEVICE — GOWN SIRUS NONREIN XL W/TWL: Brand: MEDLINE INDUSTRIES, INC.

## (undated) DEVICE — SUTURE PERMAHAND SZ 3-0 L18IN NONABSORBABLE BLK L26MM SH C013D

## (undated) DEVICE — DRESSING FOAM W4XL10IN AG SIL ADH ANTIMIC POSTOP OPTIFOAM

## (undated) DEVICE — TOTAL TRAY, DB, 100% SILI FOLEY, 16FR 10: Brand: MEDLINE

## (undated) DEVICE — SUTURE MCRYL + SZ 4-0 L18IN ABSRB UD L19MM PS-2 3/8 CIR MCP496G

## (undated) DEVICE — SUTURE PERMAHAND SZ 2-0 L30IN NONABSORBABLE BLK SILK W/O A305H

## (undated) DEVICE — STAPLER INT L75MM CUT LN L73MM STPL LN L77MM BLU B FRM 8

## (undated) DEVICE — GAUZE,SPONGE,2"X2",8PLY,STERILE,LF,2'S: Brand: MEDLINE

## (undated) DEVICE — SUCTION RESERVOIR 400CC LG VOL: Brand: CARDINAL HEALTH

## (undated) DEVICE — SUTURE PERMA-HAND SZ 2-0 L30IN NONABSORBABLE BLK L26MM SH K833H

## (undated) DEVICE — GAUZE,SPONGE,4"X4",8PLY,STRL,LF,10/TRAY: Brand: MEDLINE

## (undated) DEVICE — STAPLER EXT SKIN 35 WIDE S STL STPL SQUEEZE HNDL VISISTAT

## (undated) DEVICE — 3M™ TEGADERM™ TRANSPARENT FILM DRESSING FRAME STYLE WITH BORDER, 1616, 4 IN X 4-3/4 IN (10 CM X 12 CM), 50/CT 4CT/CASE: Brand: 3M™ TEGADERM™

## (undated) DEVICE — STAPLER INT RELD REG 60 MM VERY THCK TISS 2 ROW 4FIRING PROX

## (undated) DEVICE — KIT INFUS PMP 270ML 4ML/HR 2ML/SITE SOAK CATH L5IN N NARC

## (undated) DEVICE — SHEET,DRAPE,53X77,STERILE: Brand: MEDLINE

## (undated) DEVICE — Device

## (undated) DEVICE — SOLUTION IV IRRIG POUR BRL 0.9% SODIUM CHL 2F7124

## (undated) DEVICE — SPONGE LAP W18XL18IN WHT COT 4 PLY FLD STRUNG RADPQ DISP ST

## (undated) DEVICE — SUTURE PROL SZ 1 L30IN NONABSORBABLE BLU CTX L48MM 1/2 CIR 8455H

## (undated) DEVICE — BLADE ES L6IN ELASTOMERIC COAT EXT DURABLE BEND UPTO 90DEG